# Patient Record
Sex: MALE | Race: WHITE | NOT HISPANIC OR LATINO | Employment: FULL TIME | URBAN - METROPOLITAN AREA
[De-identification: names, ages, dates, MRNs, and addresses within clinical notes are randomized per-mention and may not be internally consistent; named-entity substitution may affect disease eponyms.]

---

## 2017-02-08 ENCOUNTER — HOSPITAL ENCOUNTER (EMERGENCY)
Facility: HOSPITAL | Age: 62
Discharge: HOME | End: 2017-02-08
Attending: EMERGENCY MEDICINE | Admitting: EMERGENCY MEDICINE
Payer: COMMERCIAL

## 2017-02-08 ENCOUNTER — APPOINTMENT (EMERGENCY)
Dept: RADIOLOGY | Facility: HOSPITAL | Age: 62
End: 2017-02-08
Payer: COMMERCIAL

## 2017-02-08 VITALS
SYSTOLIC BLOOD PRESSURE: 121 MMHG | TEMPERATURE: 102.1 F | BODY MASS INDEX: 34.3 KG/M2 | RESPIRATION RATE: 15 BRPM | DIASTOLIC BLOOD PRESSURE: 59 MMHG | HEART RATE: 105 BPM | WEIGHT: 219 LBS | OXYGEN SATURATION: 93 %

## 2017-02-08 DIAGNOSIS — J11.1 INFLUENZA: Primary | ICD-10-CM

## 2017-02-08 LAB
FLUAV AG SPEC QL IA: NEGATIVE
FLUBV AG SPEC QL IA: NEGATIVE

## 2017-02-08 PROCEDURE — 99283 EMERGENCY DEPT VISIT LOW MDM: CPT

## 2017-02-08 PROCEDURE — 87798 DETECT AGENT NOS DNA AMP: CPT | Performed by: EMERGENCY MEDICINE

## 2017-02-08 PROCEDURE — 87400 INFLUENZA A/B EACH AG IA: CPT | Performed by: EMERGENCY MEDICINE

## 2017-02-08 PROCEDURE — 71020 HB CHEST X-RAY 2VW FRONTAL&LATL: CPT

## 2017-02-08 RX ORDER — OSELTAMIVIR PHOSPHATE 75 MG/1
75 CAPSULE ORAL ONCE
Status: COMPLETED | OUTPATIENT
Start: 2017-02-08 | End: 2017-02-08

## 2017-02-08 RX ORDER — IBUPROFEN 600 MG/1
600 TABLET ORAL ONCE
Status: COMPLETED | OUTPATIENT
Start: 2017-02-08 | End: 2017-02-08

## 2017-02-08 RX ORDER — OMEPRAZOLE 20 MG/1
20 CAPSULE, DELAYED RELEASE ORAL DAILY PRN
COMMUNITY
End: 2018-12-06

## 2017-02-08 RX ORDER — OSELTAMIVIR PHOSPHATE 75 MG/1
75 CAPSULE ORAL 2 TIMES DAILY
Qty: 10 CAPSULE | Refills: 0 | Status: SHIPPED | OUTPATIENT
Start: 2017-02-08 | End: 2017-02-12 | Stop reason: SDDI

## 2017-02-08 RX ADMIN — IBUPROFEN 600 MG: 600 TABLET, FILM COATED ORAL at 22:27

## 2017-02-08 RX ADMIN — OSELTAMIVIR PHOSPHATE 75 MG: 75 CAPSULE ORAL at 23:22

## 2017-02-09 LAB
FLUAV AG SPEC QL: DETECTED
FLUBV AG SPEC QL: ABNORMAL
RSV B RNA SPEC QL NAA+PROBE: ABNORMAL

## 2017-02-10 ENCOUNTER — TELEPHONE (OUTPATIENT)
Dept: EMERGENCY DEPT | Facility: HOSPITAL | Age: 62
End: 2017-02-10

## 2017-02-12 ENCOUNTER — HOSPITAL ENCOUNTER (EMERGENCY)
Facility: HOSPITAL | Age: 62
Discharge: HOME | End: 2017-02-12
Attending: EMERGENCY MEDICINE
Payer: COMMERCIAL

## 2017-02-12 VITALS
BODY MASS INDEX: 34.3 KG/M2 | DIASTOLIC BLOOD PRESSURE: 68 MMHG | SYSTOLIC BLOOD PRESSURE: 154 MMHG | TEMPERATURE: 97.4 F | WEIGHT: 219 LBS | RESPIRATION RATE: 18 BRPM | HEART RATE: 70 BPM | OXYGEN SATURATION: 98 %

## 2017-02-12 DIAGNOSIS — B34.9 VIRAL SYNDROME: Primary | ICD-10-CM

## 2017-02-12 LAB
ANION GAP SERPL CALCULATED.3IONS-SCNC: 12 MMOL/L (ref 4–13)
BASOPHILS # BLD AUTO: 0.03 THOUSANDS/ΜL (ref 0–0.1)
BASOPHILS NFR BLD AUTO: 1 % (ref 0–1)
BUN SERPL-MCNC: 13 MG/DL (ref 5–25)
CALCIUM SERPL-MCNC: 8.1 MG/DL (ref 8.3–10.1)
CHLORIDE SERPL-SCNC: 107 MMOL/L (ref 100–108)
CO2 SERPL-SCNC: 25 MMOL/L (ref 21–32)
CREAT SERPL-MCNC: 1.01 MG/DL (ref 0.6–1.3)
EOSINOPHIL # BLD AUTO: 0.15 THOUSAND/ΜL (ref 0–0.61)
EOSINOPHIL NFR BLD AUTO: 3 % (ref 0–6)
ERYTHROCYTE [DISTWIDTH] IN BLOOD BY AUTOMATED COUNT: 14.2 % (ref 11.6–15.1)
GFR SERPL CREATININE-BSD FRML MDRD: >60 ML/MIN/1.73SQ M
GLUCOSE SERPL-MCNC: 118 MG/DL (ref 65–140)
HCT VFR BLD AUTO: 43.7 % (ref 36.5–49.3)
HGB BLD-MCNC: 15.4 G/DL (ref 12–17)
LIPASE SERPL-CCNC: 116 U/L (ref 73–393)
LYMPHOCYTES # BLD AUTO: 1.58 THOUSANDS/ΜL (ref 0.6–4.47)
LYMPHOCYTES NFR BLD AUTO: 34 % (ref 14–44)
MCH RBC QN AUTO: 32.2 PG (ref 26.8–34.3)
MCHC RBC AUTO-ENTMCNC: 35.2 G/DL (ref 31.4–37.4)
MCV RBC AUTO: 91 FL (ref 82–98)
MONOCYTES # BLD AUTO: 0.41 THOUSAND/ΜL (ref 0.17–1.22)
MONOCYTES NFR BLD AUTO: 9 % (ref 4–12)
NEUTROPHILS # BLD AUTO: 2.52 THOUSANDS/ΜL (ref 1.85–7.62)
NEUTS SEG NFR BLD AUTO: 53 % (ref 43–75)
PLATELET # BLD AUTO: 172 THOUSANDS/UL (ref 149–390)
PMV BLD AUTO: 10.5 FL (ref 8.9–12.7)
POTASSIUM SERPL-SCNC: 3.6 MMOL/L (ref 3.5–5.3)
RBC # BLD AUTO: 4.78 MILLION/UL (ref 3.88–5.62)
SODIUM SERPL-SCNC: 144 MMOL/L (ref 136–145)
TROPONIN I SERPL-MCNC: <0.02 NG/ML
WBC # BLD AUTO: 4.69 THOUSAND/UL (ref 4.31–10.16)

## 2017-02-12 PROCEDURE — 84484 ASSAY OF TROPONIN QUANT: CPT | Performed by: EMERGENCY MEDICINE

## 2017-02-12 PROCEDURE — 83690 ASSAY OF LIPASE: CPT | Performed by: EMERGENCY MEDICINE

## 2017-02-12 PROCEDURE — 85025 COMPLETE CBC W/AUTO DIFF WBC: CPT | Performed by: EMERGENCY MEDICINE

## 2017-02-12 PROCEDURE — 80048 BASIC METABOLIC PNL TOTAL CA: CPT | Performed by: EMERGENCY MEDICINE

## 2017-02-12 PROCEDURE — 96360 HYDRATION IV INFUSION INIT: CPT

## 2017-02-12 PROCEDURE — 99285 EMERGENCY DEPT VISIT HI MDM: CPT

## 2017-02-12 PROCEDURE — 36415 COLL VENOUS BLD VENIPUNCTURE: CPT | Performed by: EMERGENCY MEDICINE

## 2017-02-12 PROCEDURE — 93005 ELECTROCARDIOGRAM TRACING: CPT | Performed by: EMERGENCY MEDICINE

## 2017-02-12 RX ADMIN — SODIUM CHLORIDE 1000 ML: 0.9 INJECTION, SOLUTION INTRAVENOUS at 05:40

## 2017-02-13 LAB
ATRIAL RATE: 73 BPM
P AXIS: 30 DEGREES
PR INTERVAL: 166 MS
QRS AXIS: 20 DEGREES
QRSD INTERVAL: 90 MS
QT INTERVAL: 412 MS
QTC INTERVAL: 453 MS
T WAVE AXIS: 18 DEGREES
VENTRICULAR RATE: 73 BPM

## 2017-05-03 ENCOUNTER — ALLSCRIPTS OFFICE VISIT (OUTPATIENT)
Dept: OTHER | Facility: OTHER | Age: 62
End: 2017-05-03

## 2017-07-03 DIAGNOSIS — R53.83 OTHER FATIGUE: ICD-10-CM

## 2017-07-03 DIAGNOSIS — E78.5 HYPERLIPIDEMIA: ICD-10-CM

## 2017-07-03 DIAGNOSIS — R07.9 CHEST PAIN: ICD-10-CM

## 2017-08-31 ENCOUNTER — TRANSCRIBE ORDERS (OUTPATIENT)
Dept: ADMINISTRATIVE | Facility: HOSPITAL | Age: 62
End: 2017-08-31

## 2017-08-31 DIAGNOSIS — E78.5 OTHER AND UNSPECIFIED HYPERLIPIDEMIA: ICD-10-CM

## 2017-08-31 DIAGNOSIS — R53.83 LETHARGY: ICD-10-CM

## 2017-08-31 DIAGNOSIS — R07.9 CHEST PAIN, UNSPECIFIED: Primary | ICD-10-CM

## 2017-09-12 ENCOUNTER — HOSPITAL ENCOUNTER (OUTPATIENT)
Dept: NON INVASIVE DIAGNOSTICS | Facility: CLINIC | Age: 62
Discharge: HOME | End: 2017-09-12
Payer: COMMERCIAL

## 2017-09-12 DIAGNOSIS — R07.9 CHEST PAIN, UNSPECIFIED: ICD-10-CM

## 2017-09-12 DIAGNOSIS — R53.83 LETHARGY: ICD-10-CM

## 2017-09-12 DIAGNOSIS — E78.5 OTHER AND UNSPECIFIED HYPERLIPIDEMIA: ICD-10-CM

## 2017-09-12 PROCEDURE — 93306 TTE W/DOPPLER COMPLETE: CPT

## 2017-09-30 ENCOUNTER — GENERIC CONVERSION - ENCOUNTER (OUTPATIENT)
Dept: OTHER | Facility: OTHER | Age: 62
End: 2017-09-30

## 2017-10-09 ENCOUNTER — GENERIC CONVERSION - ENCOUNTER (OUTPATIENT)
Dept: OTHER | Facility: OTHER | Age: 62
End: 2017-10-09

## 2017-10-19 ENCOUNTER — APPOINTMENT (EMERGENCY)
Dept: CT IMAGING | Facility: HOSPITAL | Age: 62
End: 2017-10-19
Payer: COMMERCIAL

## 2017-10-19 ENCOUNTER — HOSPITAL ENCOUNTER (EMERGENCY)
Facility: HOSPITAL | Age: 62
Discharge: HOME | End: 2017-10-19
Attending: EMERGENCY MEDICINE | Admitting: EMERGENCY MEDICINE
Payer: COMMERCIAL

## 2017-10-19 VITALS
WEIGHT: 214 LBS | HEART RATE: 62 BPM | DIASTOLIC BLOOD PRESSURE: 87 MMHG | SYSTOLIC BLOOD PRESSURE: 158 MMHG | HEIGHT: 67 IN | RESPIRATION RATE: 18 BRPM | BODY MASS INDEX: 33.59 KG/M2 | OXYGEN SATURATION: 99 % | TEMPERATURE: 97.4 F

## 2017-10-19 DIAGNOSIS — N20.1 URETEROLITHIASIS: Primary | ICD-10-CM

## 2017-10-19 LAB
ALBUMIN SERPL BCP-MCNC: 3.7 G/DL (ref 3.5–5)
ALP SERPL-CCNC: 108 U/L (ref 46–116)
ALT SERPL W P-5'-P-CCNC: 37 U/L (ref 12–78)
ANION GAP SERPL CALCULATED.3IONS-SCNC: 9 MMOL/L (ref 4–13)
AST SERPL W P-5'-P-CCNC: 25 U/L (ref 5–45)
BASOPHILS # BLD AUTO: 0.04 THOUSANDS/ΜL (ref 0–0.1)
BASOPHILS NFR BLD AUTO: 1 % (ref 0–1)
BILIRUB SERPL-MCNC: 0.6 MG/DL (ref 0.2–1)
BUN SERPL-MCNC: 17 MG/DL (ref 5–25)
CALCIUM SERPL-MCNC: 9.1 MG/DL (ref 8.3–10.1)
CHLORIDE SERPL-SCNC: 102 MMOL/L (ref 100–108)
CLARITY, POC: NORMAL
CO2 SERPL-SCNC: 31 MMOL/L (ref 21–32)
COLOR, POC: YELLOW
CREAT SERPL-MCNC: 1.19 MG/DL (ref 0.6–1.3)
EOSINOPHIL # BLD AUTO: 0.18 THOUSAND/ΜL (ref 0–0.61)
EOSINOPHIL NFR BLD AUTO: 3 % (ref 0–6)
ERYTHROCYTE [DISTWIDTH] IN BLOOD BY AUTOMATED COUNT: 14.1 % (ref 11.6–15.1)
EXT BILIRUBIN, UA: NORMAL
EXT BLOOD URINE: NORMAL
EXT GLUCOSE, UA: NORMAL
EXT KETONES: NORMAL
EXT NITRITE, UA: NORMAL
EXT PH, UA: 8
EXT PROTEIN, UA: NORMAL
EXT SPECIFIC GRAVITY, UA: 1
EXT UROBILINOGEN: 0.2
GFR SERPL CREATININE-BSD FRML MDRD: 65 ML/MIN/1.73SQ M
GLUCOSE SERPL-MCNC: 100 MG/DL (ref 65–140)
HCT VFR BLD AUTO: 46 % (ref 36.5–49.3)
HGB BLD-MCNC: 16.1 G/DL (ref 12–17)
LYMPHOCYTES # BLD AUTO: 1.99 THOUSANDS/ΜL (ref 0.6–4.47)
LYMPHOCYTES NFR BLD AUTO: 31 % (ref 14–44)
MCH RBC QN AUTO: 32 PG (ref 26.8–34.3)
MCHC RBC AUTO-ENTMCNC: 35 G/DL (ref 31.4–37.4)
MCV RBC AUTO: 92 FL (ref 82–98)
MONOCYTES # BLD AUTO: 0.63 THOUSAND/ΜL (ref 0.17–1.22)
MONOCYTES NFR BLD AUTO: 10 % (ref 4–12)
NEUTROPHILS # BLD AUTO: 3.66 THOUSANDS/ΜL (ref 1.85–7.62)
NEUTS SEG NFR BLD AUTO: 55 % (ref 43–75)
PLATELET # BLD AUTO: 219 THOUSANDS/UL (ref 149–390)
PMV BLD AUTO: 10 FL (ref 8.9–12.7)
POTASSIUM SERPL-SCNC: 3.3 MMOL/L (ref 3.5–5.3)
PROT SERPL-MCNC: 8 G/DL (ref 6.4–8.2)
RBC # BLD AUTO: 5.03 MILLION/UL (ref 3.88–5.62)
SODIUM SERPL-SCNC: 142 MMOL/L (ref 136–145)
WBC # BLD AUTO: 6.5 THOUSAND/UL (ref 4.31–10.16)
WBC # BLD EST: NORMAL 10*3/UL

## 2017-10-19 PROCEDURE — 80053 COMPREHEN METABOLIC PANEL: CPT | Performed by: EMERGENCY MEDICINE

## 2017-10-19 PROCEDURE — 74176 CT ABD & PELVIS W/O CONTRAST: CPT

## 2017-10-19 PROCEDURE — 99284 EMERGENCY DEPT VISIT MOD MDM: CPT

## 2017-10-19 PROCEDURE — 81002 URINALYSIS NONAUTO W/O SCOPE: CPT | Performed by: EMERGENCY MEDICINE

## 2017-10-19 PROCEDURE — 36415 COLL VENOUS BLD VENIPUNCTURE: CPT | Performed by: EMERGENCY MEDICINE

## 2017-10-19 PROCEDURE — 85025 COMPLETE CBC W/AUTO DIFF WBC: CPT | Performed by: EMERGENCY MEDICINE

## 2017-10-19 RX ORDER — CHLORTHALIDONE 25 MG/1
25 TABLET ORAL DAILY
COMMUNITY
End: 2021-04-09 | Stop reason: SDUPTHER

## 2017-10-19 RX ORDER — HYDROCODONE BITARTRATE AND ACETAMINOPHEN 5; 325 MG/1; MG/1
1 TABLET ORAL EVERY 4 HOURS PRN
Qty: 16 TABLET | Refills: 0 | Status: SHIPPED | OUTPATIENT
Start: 2017-10-19 | End: 2017-10-29

## 2017-10-19 NOTE — ED PROVIDER NOTES
History  Chief Complaint   Patient presents with    Flank Pain     Patient presents to ER stating he started with right flank pain 15 minutes ago, patient has had numerous kidney stones and lithotripsies in the past        This is a 59-year-old male complaining of right flank pain started about 2 hours ago radiates to the right groin sharp pain started spontaneously suddenly  Patient did not take any pain medications at the pain has decreased in severity  Since that time frame  It feels similar to his kidney stones in the past where he has had stents and lithotripsy  Patient did not notice any fevers nausea vomiting diarrhea or constipation  He did not notice any blood in the urine or urinary frequency burning or pain  Flank Pain       Prior to Admission Medications   Prescriptions Last Dose Informant Patient Reported? Taking? Omega-3 Fatty Acids (FISH OIL PO)   Yes No   Sig: Take 2,400 mg by mouth daily     Potassium Citrate-Citric Acid (POT CITRATE-CIT AC PO)   Yes No   Sig: Take 20 mEq by mouth 2 (two) times a day     chlorthalidone 25 mg tablet   Yes Yes   Sig: Take 25 mg by mouth daily   omeprazole (PriLOSEC) 20 mg delayed release capsule   Yes No   Sig: Take 20 mg by mouth daily as needed        Facility-Administered Medications: None       Past Medical History:   Diagnosis Date    GERD (gastroesophageal reflux disease)     Hyperlipidemia     Kidney stone     Meniere disease     Pulmonary embolism (HCC)        Past Surgical History:   Procedure Laterality Date    BACK SURGERY      COCHLEAR IMPLANT Right     HERNIA REPAIR      IVC FILTER INSERTION         History reviewed  No pertinent family history  I have reviewed and agree with the history as documented  Social History   Substance Use Topics    Smoking status: Never Smoker    Smokeless tobacco: Never Used    Alcohol use No        Review of Systems   Genitourinary: Positive for flank pain     All other systems reviewed and are negative  Physical Exam  ED Triage Vitals   Temperature Pulse Respirations Blood Pressure SpO2   10/19/17 0946 10/19/17 0945 10/19/17 0945 10/19/17 0945 10/19/17 0945   (!) 97 4 °F (36 3 °C) 73 18 165/82 99 %      Temp Source Heart Rate Source Patient Position - Orthostatic VS BP Location FiO2 (%)   10/19/17 0946 10/19/17 0945 10/19/17 0945 10/19/17 0945 --   Temporal Monitor Lying Right arm       Pain Score       10/19/17 0946       3           Physical Exam   Constitutional: He appears well-developed and well-nourished  HENT:   Mouth/Throat: Oropharynx is clear and moist    Eyes: Conjunctivae and EOM are normal  Pupils are equal, round, and reactive to light  Neck: Normal range of motion  Neck supple  No spinous process tenderness present  Cardiovascular: Normal rate, regular rhythm, normal heart sounds and intact distal pulses  Pulmonary/Chest: Effort normal and breath sounds normal  No respiratory distress  He has no wheezes  Abdominal: Soft  Bowel sounds are normal  He exhibits no distension  There is no tenderness  There is CVA tenderness  Musculoskeletal: Normal range of motion  Neurological: He is alert  He has normal strength  No sensory deficit  GCS eye subscore is 4  GCS verbal subscore is 5  GCS motor subscore is 6  Skin: Skin is warm and dry  No rash noted  Psychiatric: He has a normal mood and affect  Nursing note and vitals reviewed        ED Medications  Medications - No data to display    Diagnostic Studies  Labs Reviewed   COMPREHENSIVE METABOLIC PANEL - Abnormal        Result Value Ref Range Status    Potassium 3 3 (*) 3 5 - 5 3 mmol/L Final    Sodium 142  136 - 145 mmol/L Final    Chloride 102  100 - 108 mmol/L Final    CO2 31  21 - 32 mmol/L Final    Anion Gap 9  4 - 13 mmol/L Final    BUN 17  5 - 25 mg/dL Final    Creatinine 1 19  0 60 - 1 30 mg/dL Final    Comment: Standardized to IDMS reference method    Glucose 100  65 - 140 mg/dL Final    Comment:   If the patient is fasting, the ADA then defines impaired fasting glucose as > 100 mg/dL and diabetes as > or equal to 123 mg/dL  Specimen collection should occur prior to Sulfasalazine administration due to the potential for falsely depressed results  Specimen collection should occur prior to Sulfapyridine administration due to the potential for falsely elevated results  Calcium 9 1  8 3 - 10 1 mg/dL Final    AST 25  5 - 45 U/L Final    Comment:   Specimen collection should occur prior to Sulfasalazine administration due to the potential for falsely depressed results  ALT 37  12 - 78 U/L Final    Comment:   Specimen collection should occur prior to Sulfasalazine administration due to the potential for falsely depressed results  Alkaline Phosphatase 108  46 - 116 U/L Final    Total Protein 8 0  6 4 - 8 2 g/dL Final    Albumin 3 7  3 5 - 5 0 g/dL Final    Total Bilirubin 0 60  0 20 - 1 00 mg/dL Final    eGFR 65  ml/min/1 73sq m Final    Narrative:     National Kidney Disease Education Program recommendations are as follows:  GFR calculation is accurate only with a steady state creatinine  Chronic Kidney disease less than 60 ml/min/1 73 sq  meters  Kidney failure less than 15 ml/min/1 73 sq  meters     CBC AND DIFFERENTIAL - Normal    WBC 6 50  4 31 - 10 16 Thousand/uL Final    RBC 5 03  3 88 - 5 62 Million/uL Final    Hemoglobin 16 1  12 0 - 17 0 g/dL Final    Hematocrit 46 0  36 5 - 49 3 % Final    MCV 92  82 - 98 fL Final    MCH 32 0  26 8 - 34 3 pg Final    MCHC 35 0  31 4 - 37 4 g/dL Final    RDW 14 1  11 6 - 15 1 % Final    MPV 10 0  8 9 - 12 7 fL Final    Platelets 189  158 - 390 Thousands/uL Final    Neutrophils Relative 55  43 - 75 % Final    Lymphocytes Relative 31  14 - 44 % Final    Monocytes Relative 10  4 - 12 % Final    Eosinophils Relative 3  0 - 6 % Final    Basophils Relative 1  0 - 1 % Final    Neutrophils Absolute 3 66  1 85 - 7 62 Thousands/µL Final    Lymphocytes Absolute 1 99  0 60 - 4 47 Thousands/µL Final    Monocytes Absolute 0 63  0 17 - 1 22 Thousand/µL Final    Eosinophils Absolute 0 18  0 00 - 0 61 Thousand/µL Final    Basophils Absolute 0 04  0 00 - 0 10 Thousands/µL Final   POCT URINALYSIS DIPSTICK - Normal    Color, UA YELLOW   Final    Clarity, UA HAZY   Final    EXT Glucose, UA NEG   Final    EXT Bilirubin, UA (Ref: Negative) NEG   Final    EXT Ketones, UA (Ref: Negative) NEG   Final    EXT Spec Grav, UA 1 005   Final    EXT Blood, UA (Ref: Negative) MOD   Final    EXT pH, UA 8 0   Final    EXT Protein, UA (Ref: Negative) TRACE   Final    EXT Urobilinogen, UA (Ref: 0 2- 1 0) 0 2   Final    EXT Leukocytes, UA (Ref: Negative) NEG   Final    EXT Nitrite, UA (Ref: Negative) NEG   Final       CT renal stone study abdomen pelvis without contrast   Final Result   Addendum 1 of 1   Addendum; Impression;         Mild dilation of the right pelvicalyceal system with an obstructing 5 mm    calculus in the proximal right ureter near the right UP junction      Bilateral nonobstructing renal calculi         ##[email or web address]##[email or web address]         Final      Mild dilation of the right pelvicalyceal system with an obstructing 5 mm calculus in the proximal right ureter near the right UP junction      Bilateral nonobstructing renal calculi         Workstation performed: [id number]             Procedures  Procedures      Phone Contacts  ED Phone Contact    ED Course  ED Course                                MDM  Number of Diagnoses or Management Options  Ureterolithiasis: new and requires workup     Amount and/or Complexity of Data Reviewed  Clinical lab tests: ordered and reviewed  Tests in the radiology section of CPT®: ordered and reviewed    Patient Progress  Patient progress: improved    CritCare Time    Disposition  Final diagnoses:   Ureterolithiasis     ED Disposition     ED Disposition Condition Comment    Discharge  [address]  discharge to home/self care      Condition at discharge: Good Follow-up Information     Follow up With Specialties Details Why Contact Info    Jose Morataya MD Urology In 5 days  [address] [facility]  [facility] [address]  [phone]          Discharge Medication List as of 10/19/2017 10:38 AM      START taking these medications    Details   HYDROcodone-acetaminophen (NORCO) 5-325 mg per tablet Take 1 tablet by mouth every 4 (four) hours as needed for pain for up to 10 days Max Daily Amount: 6 tablets, Starting Thu 10/19/2017, Until Sun 10/29/2017, Print         CONTINUE these medications which have NOT CHANGED    Details   chlorthalidone 25 mg tablet Take 25 mg by mouth daily, Historical Med      Omega-3 Fatty Acids (FISH OIL PO) Take 2,400 mg by mouth daily  , Historical Med      omeprazole (PriLOSEC) 20 mg delayed release capsule Take 20 mg by mouth daily as needed  , Historical Med      Potassium Citrate-Citric Acid (POT CITRATE-CIT AC PO) Take 20 mEq by mouth 2 (two) times a day  , Historical Med           No discharge procedures on file      ED Provider  Electronically Signed by       Royal Stephenson DO  10/19/17 [id number]

## 2017-10-19 NOTE — DISCHARGE INSTRUCTIONS
Ureteral Stones   WHAT YOU NEED TO KNOW:   A ureteral stone is a stone that forms in the kidney and moves down the ureter and gets stuck there  The ureter is the tube that takes urine from the kidney to the bladder  Stones can form in the urinary system when your urine has high levels of minerals and salts  Urinary stones can be made of uric acid, calcium, phosphate, or oxalate crystals  DISCHARGE INSTRUCTIONS:   Return to the emergency department if:   · You have severe pain that does not improve, even after you take medicine  · You have vomiting that is not relieved by medicine  · You develop a fever  Contact your healthcare provider if:   · You develop a fever  · You have any questions or concerns about your condition or care  Follow up with your healthcare provider as directed: You may need to return for more tests  Write down your questions so you remember to ask them during your visits  Medicines: You may need any of the following:  · NSAIDs , such as ibuprofen, help decrease swelling, pain, and fever  This medicine is available with or without a doctor's order  NSAIDs can cause stomach bleeding or kidney problems in certain people  If you take blood thinner medicine, always ask your healthcare provider if NSAIDs are safe for you  Always read the medicine label and follow directions  · Prescription pain medicine  may help decrease pain or help your ureteral stone pass  Do not wait until the pain is severe before you take pain medicine  · Nausea medicine  may help calm your stomach and prevent vomiting  · Take your medicine as directed  Contact your healthcare provider if you think your medicine is not helping or if you have side effects  Tell him or her if you are allergic to any medicine  Keep a list of the medicines, vitamins, and herbs you take  Include the amounts, and when and why you take them  Bring the list or the pill bottles to follow-up visits   Carry your medicine list with you in case of an emergency  Self-care:   · Drink plenty of liquids  Your healthcare provider may tell you to drink at least 8 to 12 (eight-ounce) cups of liquids each day  This helps flush out the ureteral stones when you urinate  Water is the best liquid to drink  · Strain your urine every time you go to the bathroom  Urinate through a strainer or a piece of thin cloth to catch the stones  Take the stones to your healthcare provider so they can be sent to the lab for tests  This will help your healthcare providers plan the best treatment for you  · Ask your healthcare provider about any nutrition changes you need to make  You may need to limit certain foods such as foods high in sodium (salt), certain protein foods, or foods high in oxalate  After you pass your ureteral stone: Once you have passed your ureteral stone, you may need to do a 24-hour urine test  You may need to save all of your urine for 24 hours  Each time you go to the bathroom, you will urinate into a container  Then you will pour your urine into a larger container that is kept cold  You may be told to write down the time and amount of urine you passed  At the end of 24 hours, the urine is sent to a lab for tests  Results from the test will help your healthcare provider plan ways to prevent more stones from forming  © 2017 [address] [employer] Information is for End User's use only and may not be sold, redistributed or otherwise used for commercial purposes  All illustrations and images included in CareNotes® are the copyrighted property of A [employer]  or [employer]  The above information is an  only  It is not intended as medical advice for individual conditions or treatments  Talk to your doctor, nurse or pharmacist before following any medical regimen to see if it is safe and effective for you

## 2017-10-30 ENCOUNTER — GENERIC CONVERSION - ENCOUNTER (OUTPATIENT)
Dept: OTHER | Facility: OTHER | Age: 62
End: 2017-10-30

## 2017-11-01 ENCOUNTER — GENERIC CONVERSION - ENCOUNTER (OUTPATIENT)
Dept: OTHER | Facility: OTHER | Age: 62
End: 2017-11-01

## 2017-12-01 ENCOUNTER — HOSPITAL ENCOUNTER (OUTPATIENT)
Dept: RADIOLOGY | Facility: HOSPITAL | Age: 62
Discharge: HOME | End: 2017-12-01
Attending: UROLOGY
Payer: COMMERCIAL

## 2017-12-01 ENCOUNTER — GENERIC CONVERSION - ENCOUNTER (OUTPATIENT)
Dept: OTHER | Facility: OTHER | Age: 62
End: 2017-12-01

## 2017-12-01 ENCOUNTER — TRANSCRIBE ORDERS (OUTPATIENT)
Dept: ADMINISTRATIVE | Facility: HOSPITAL | Age: 62
End: 2017-12-01

## 2017-12-01 DIAGNOSIS — N20.0 URIC ACID NEPHROLITHIASIS: ICD-10-CM

## 2017-12-01 DIAGNOSIS — N20.1 CALCULUS OF URETER: ICD-10-CM

## 2017-12-01 DIAGNOSIS — N20.0 URIC ACID NEPHROLITHIASIS: Primary | ICD-10-CM

## 2017-12-01 PROCEDURE — 74000 HB X-RAY EXAM OF ABDOMEN (SINGLE ANTEROPOSTERIOR VIEW): CPT

## 2018-01-13 VITALS
DIASTOLIC BLOOD PRESSURE: 68 MMHG | BODY MASS INDEX: 33.52 KG/M2 | SYSTOLIC BLOOD PRESSURE: 120 MMHG | WEIGHT: 214 LBS | HEART RATE: 66 BPM

## 2018-01-22 VITALS
HEIGHT: 67 IN | WEIGHT: 213 LBS | SYSTOLIC BLOOD PRESSURE: 114 MMHG | DIASTOLIC BLOOD PRESSURE: 70 MMHG | HEART RATE: 76 BPM | BODY MASS INDEX: 33.43 KG/M2

## 2018-04-04 ENCOUNTER — OFFICE VISIT (OUTPATIENT)
Dept: CARDIOLOGY CLINIC | Facility: CLINIC | Age: 63
End: 2018-04-04
Payer: COMMERCIAL

## 2018-04-04 ENCOUNTER — TRANSCRIBE ORDERS (OUTPATIENT)
Dept: ADMINISTRATIVE | Facility: HOSPITAL | Age: 63
End: 2018-04-04

## 2018-04-04 VITALS
HEART RATE: 68 BPM | SYSTOLIC BLOOD PRESSURE: 136 MMHG | WEIGHT: 216.2 LBS | BODY MASS INDEX: 33.93 KG/M2 | DIASTOLIC BLOOD PRESSURE: 80 MMHG | RESPIRATION RATE: 16 BRPM | HEIGHT: 67 IN

## 2018-04-04 DIAGNOSIS — E78.00 PURE HYPERCHOLESTEROLEMIA: Primary | ICD-10-CM

## 2018-04-04 DIAGNOSIS — R07.9 CHEST PAIN, UNSPECIFIED TYPE: ICD-10-CM

## 2018-04-04 PROCEDURE — 99214 OFFICE O/P EST MOD 30 MIN: CPT | Performed by: INTERNAL MEDICINE

## 2018-04-04 PROCEDURE — 93000 ELECTROCARDIOGRAM COMPLETE: CPT | Performed by: INTERNAL MEDICINE

## 2018-04-04 RX ORDER — PSEUDOEPHEDRINE HYDROCHLORIDE 30 MG/1
TABLET ORAL
COMMUNITY
End: 2018-07-11

## 2018-04-04 RX ORDER — CRANBERRY FRUIT EXTRACT 650 MG
CAPSULE ORAL 2 TIMES DAILY
COMMUNITY

## 2018-04-04 RX ORDER — PRAVASTATIN SODIUM 10 MG
1 TABLET ORAL
COMMUNITY
Start: 2016-08-09 | End: 2018-07-11

## 2018-04-04 RX ORDER — MAGNESIUM OXIDE 400 MG/1
TABLET ORAL
COMMUNITY
End: 2018-07-11

## 2018-04-04 RX ORDER — EZETIMIBE 10 MG/1
1 TABLET ORAL DAILY
COMMUNITY
Start: 2017-05-03 | End: 2018-08-22 | Stop reason: SDUPTHER

## 2018-04-04 NOTE — PATIENT INSTRUCTIONS
I will continue the patient's present medical regimen  The patient appears well compensated  I have asked the patient to call if there is a problem in the interim otherwise I will see the patient in six months time  I will also check a BMP to assess her potassium level and a carotid Doppler to exclude severe carotid artery disease

## 2018-04-04 NOTE — PROGRESS NOTES
Cardiology Follow Up    Niki Celaya   1955  [id number]  [address] [facility] [address]    1  Pure hypercholesterolemia     2  Chest pain, unspecified type         Interval History:  Patient is here for a follow-up visit  He was last seen by me in November of last year  His most recent echocardiogram done in September of last year demonstrated preserved LV systolic function without evidence of significant valvular heart disease  His EKG today demonstrates sinus rhythm  He apparently was moving snow recently in developed some lightheadedness  He also had some palpitations  He has had no problem since  He was using his upper body for a lot of the snow removal and I asked him to use caution in the future  He is concerned about his potassium level given that the urologist changed his potassium containing medication  We will check a BMP  He has had hypokalemia before  There is no problem list on file for this patient  Past Medical History:   Diagnosis Date    GERD (gastroesophageal reflux disease)     Hyperlipidemia     Kidney stone     Meniere disease     Pulmonary embolism (HCC)      Social History     Social History    Marital status:      Spouse name: N/A    Number of children: N/A    Years of education: N/A     Occupational History    Not on file  Social History Main Topics    Smoking status: Never Smoker    Smokeless tobacco: Never Used    Alcohol use No    Drug use: No    Sexual activity: Not on file     Other Topics Concern    Not on file     Social History Narrative    No narrative on file      No family history on file    Past Surgical History:   Procedure Laterality Date    BACK SURGERY      COCHLEAR IMPLANT Right     HERNIA REPAIR      IVC FILTER INSERTION         Current Outpatient Prescriptions:     ezetimibe (ZETIA) 10 mg tablet, Take 1 tablet by mouth daily, Disp: , Rfl:     pravastatin (PRAVACHOL) 10 mg tablet, Take 1 tablet by mouth, Disp: , Rfl:     chlorthalidone 25 mg tablet, Take 25 mg by mouth daily, Disp: , Rfl:     magnesium oxide (MAG-OX) 400 mg tablet, Take by mouth, Disp: , Rfl:     Omega-3 Fatty Acids (FISH OIL PO), Take 2,400 mg by mouth daily  , Disp: , Rfl:     omeprazole (PriLOSEC) 20 mg delayed release capsule, Take 20 mg by mouth daily as needed  , Disp: , Rfl:     Potassium Citrate-Citric Acid (POT CITRATE-CIT AC PO), Take 20 mEq by mouth 2 (two) times a day  , Disp: , Rfl:   Allergies   Allergen Reactions    Alcohol      vertigo    Caffeine      vertigo    Cephalosporins      hyperventilation    Penicillins Hives       Labs:not applicable  Imaging: No results found  Review of Systems:  Review of Systems   All other systems reviewed and are negative  Physical Exam:  Physical Exam   Constitutional: He is oriented to person, place, and time  He appears well-developed and well-nourished  HENT:   Head: Normocephalic and atraumatic  Eyes: Conjunctivae are normal  Pupils are equal, round, and reactive to light  Neck: Normal range of motion  Neck supple  Cardiovascular: Normal rate and normal heart sounds  Pulmonary/Chest: Effort normal and breath sounds normal    Neurological: He is alert and oriented to person, place, and time  Skin: Skin is warm and dry  Psychiatric: He has a normal mood and affect  Vitals reviewed  Discussion/Summary:I will continue the patient's present medical regimen  The patient appears well compensated  I have asked the patient to call if there is a problem in the interim otherwise I will see the patient in six months time  He would also like to get a carotid artery check and I will order a Doppler

## 2018-04-10 ENCOUNTER — OFFICE VISIT (OUTPATIENT)
Dept: URGENT CARE | Facility: CLINIC | Age: 63
End: 2018-04-10
Payer: COMMERCIAL

## 2018-04-10 VITALS
RESPIRATION RATE: 16 BRPM | TEMPERATURE: 97.5 F | OXYGEN SATURATION: 95 % | DIASTOLIC BLOOD PRESSURE: 86 MMHG | HEART RATE: 70 BPM | BODY MASS INDEX: 33.74 KG/M2 | SYSTOLIC BLOOD PRESSURE: 142 MMHG | WEIGHT: 215 LBS | HEIGHT: 67 IN

## 2018-04-10 DIAGNOSIS — H11.32 SUBCONJUNCTIVAL BLEED, LEFT: Primary | ICD-10-CM

## 2018-04-10 PROCEDURE — S9083 URGENT CARE CENTER GLOBAL: HCPCS | Performed by: FAMILY MEDICINE

## 2018-04-10 PROCEDURE — 99283 EMERGENCY DEPT VISIT LOW MDM: CPT | Performed by: FAMILY MEDICINE

## 2018-04-10 PROCEDURE — G0382 LEV 3 HOSP TYPE B ED VISIT: HCPCS | Performed by: FAMILY MEDICINE

## 2018-04-10 NOTE — PROGRESS NOTES
[facility]        NAME: Dago Ventura  is a 58 y o  male  : 1955    MRN: [id number]  DATE: April 10, 2018  TIME: 12:17 PM    Assessment and Plan   Subconjunctival bleed, left [H11 32]  1  Subconjunctival bleed, left           Patient Instructions   Patient told this is a benign condition  If any vision changes, or develop pain go to Er  Follow up with PCP in 3-5 days  Proceed to  ER if symptoms worsen  Chief Complaint     Chief Complaint   Patient presents with    Eye Pain     Since last night has had blood in left eye  No drainage  No pain         History of Present Illness       Patient here complaining of redness in his left eye  He states his co-worker pointed it out to him at work last night  When he work up this am and it was still there he thought he should get it checked out  He denies pain or discomfort, vision changes, ASA or blood thinner use, tearing, itching, or excessive straining or heavy lifting  Review of Systems   Review of Systems   Eyes: Positive for redness  Negative for photophobia, pain, discharge, itching and visual disturbance  Neurological: Negative for headaches           Current Medications       Current Outpatient Prescriptions:     chlorthalidone 25 mg tablet, Take 25 mg by mouth daily, Disp: , Rfl:     ezetimibe (ZETIA) 10 mg tablet, Take 1 tablet by mouth daily, Disp: , Rfl:     Nutritional Supplements (THERALITH XR) TABS, Take by mouth 2 (two) times a day 2 tablets BID, Disp: , Rfl:     magnesium oxide (MAG-OX) 400 mg tablet, Take by mouth, Disp: , Rfl:     Omega-3 Fatty Acids (FISH OIL PO), Take 2,400 mg by mouth daily  , Disp: , Rfl:     omeprazole (PriLOSEC) 20 mg delayed release capsule, Take 20 mg by mouth daily as needed  , Disp: , Rfl:     Potassium Citrate-Citric Acid (POT CITRATE-CIT AC PO), Take 20 mEq by mouth 2 (two) times a day  , Disp: , Rfl:     pravastatin (PRAVACHOL) 10 mg tablet, Take 1 tablet by mouth, Disp: , Rfl:   pseudoephedrine (SUDAFED) 30 mg tablet, Take by mouth, Disp: , Rfl:     Current Allergies     Allergies as of 04/10/2018 - Reviewed 04/10/2018   Allergen Reaction Noted    Moxifloxacin Shortness Of Breath and Hives 10/16/2013    Alcohol  02/08/2017    Caffeine  05/27/2016    Cephalosporins  05/27/2016    Doxycycline      Penicillins Hives 07/30/2015    Pravastatin      Tamiflu [oseltamivir] Lightheadedness 04/04/2018            The following portions of the patient's history were reviewed and updated as appropriate: allergies, current medications, past family history, past medical history, past social history, past surgical history and problem list      Past Medical History:   Diagnosis Date    GERD (gastroesophageal reflux disease)     Hyperlipidemia     Kidney stone     Meniere disease     Pulmonary embolism ([address] )        Past Surgical History:   Procedure Laterality Date    BACK SURGERY      COCHLEAR IMPLANT Right     HERNIA REPAIR      IVC FILTER INSERTION         No family history on file  Medications have been verified  Objective   /86   Pulse 70   Temp 97 5 °F (36 4 °C)   Resp 16   Ht 5' 7" (1 702 m)   Wt 97 5 kg (215 lb)   SpO2 95%   BMI 33 67 kg/m²        Physical Exam     Physical Exam   Constitutional: He appears well-developed and well-nourished  No distress  Eyes: Conjunctivae and EOM are normal  Pupils are equal, round, and reactive to light  Small subconjunctival hemorrhage just to the outside of the iris at the 9:00 position, not extending across the iris or into the anterior chamber   Incidental finding: pingueculem

## 2018-04-11 ENCOUNTER — HOSPITAL ENCOUNTER (OUTPATIENT)
Dept: NON INVASIVE DIAGNOSTICS | Facility: CLINIC | Age: 63
Discharge: HOME | End: 2018-04-11
Payer: COMMERCIAL

## 2018-04-11 DIAGNOSIS — I65.29 CAROTID ARTERY STENOSIS, UNILATERAL: ICD-10-CM

## 2018-04-11 DIAGNOSIS — R07.9 CHEST PAIN, UNSPECIFIED TYPE: ICD-10-CM

## 2018-04-11 DIAGNOSIS — E78.00 PURE HYPERCHOLESTEROLEMIA: ICD-10-CM

## 2018-04-11 PROCEDURE — 93880 EXTRACRANIAL BILAT STUDY: CPT

## 2018-04-11 PROCEDURE — 93880 EXTRACRANIAL BILAT STUDY: CPT | Performed by: INTERNAL MEDICINE

## 2018-04-13 LAB
BUN SERPL-MCNC: 18 MG/DL (ref 8–27)
BUN/CREAT SERPL: 17 (ref 10–24)
CALCIUM SERPL-MCNC: 9.2 MG/DL (ref 8.6–10.2)
CHLORIDE SERPL-SCNC: 99 MMOL/L (ref 96–106)
CO2 SERPL-SCNC: 26 MMOL/L (ref 18–29)
CREAT SERPL-MCNC: 1.09 MG/DL (ref 0.76–1.27)
GLUCOSE SERPL-MCNC: 111 MG/DL (ref 65–99)
POTASSIUM SERPL-SCNC: 3.1 MMOL/L (ref 3.5–5.2)
SL AMB EGFR AFRICAN AMERICAN: 84 ML/MIN/1.73
SL AMB EGFR NON AFRICAN AMERICAN: 72 ML/MIN/1.73
SODIUM SERPL-SCNC: 141 MMOL/L (ref 134–144)

## 2018-04-16 ENCOUNTER — TELEPHONE (OUTPATIENT)
Dept: CARDIOLOGY CLINIC | Facility: CLINIC | Age: 63
End: 2018-04-16

## 2018-04-16 DIAGNOSIS — E87.6 HYPOKALEMIA: Primary | ICD-10-CM

## 2018-04-16 NOTE — TELEPHONE ENCOUNTER
Pt called back  I attempted to call him again- no answer  Left message advising-- advised to c/b w/ question  Order in for b/w  Since message was given to Pt today- advised to get b/w on Thursday if that is ok w/ you?

## 2018-04-16 NOTE — TELEPHONE ENCOUNTER
----- Message from Pham Jimenes MD sent at 4/13/2018  3:40 PM EDT -----  Please call the patient regarding his abnormal result  His K is low   Should take KCL 40 meq bid for 2 days the 40 meq q day and get BMP Tuesday, tx

## 2018-04-18 ENCOUNTER — TELEPHONE (OUTPATIENT)
Dept: CARDIOLOGY CLINIC | Facility: CLINIC | Age: 63
End: 2018-04-18

## 2018-04-18 DIAGNOSIS — E87.6 HYPOKALEMIA: ICD-10-CM

## 2018-04-18 DIAGNOSIS — R89.9 ABNORMAL LABORATORY TEST RESULT: Primary | ICD-10-CM

## 2018-04-18 NOTE — TELEPHONE ENCOUNTER
Pt stopped into the office to check on the messages that were left on his phone  Advised pt that his K+ is 3 1, low, and needs more K+, and to re-check his labs  Pt is no longer on K+ 20 mEq-says per his urologist an alternate drug by the name of  Deitra Majestic  is better for him due to preventing kidney stones

## 2018-04-30 LAB
AMBIG ABBREV DEFAULT: NORMAL
BUN SERPL-MCNC: 18 MG/DL (ref 8–27)
BUN/CREAT SERPL: 16 (ref 10–24)
CALCIUM SERPL-MCNC: 9.1 MG/DL (ref 8.6–10.2)
CHLORIDE SERPL-SCNC: 100 MMOL/L (ref 96–106)
CO2 SERPL-SCNC: 27 MMOL/L (ref 18–29)
CREAT SERPL-MCNC: 1.13 MG/DL (ref 0.76–1.27)
GLUCOSE SERPL-MCNC: 109 MG/DL (ref 65–99)
POTASSIUM SERPL-SCNC: 3.7 MMOL/L (ref 3.5–5.2)
SL AMB EGFR AFRICAN AMERICAN: 80 ML/MIN/1.73
SL AMB EGFR NON AFRICAN AMERICAN: 69 ML/MIN/1.73
SODIUM SERPL-SCNC: 142 MMOL/L (ref 134–144)

## 2018-06-18 DIAGNOSIS — E78.00 PURE HYPERCHOLESTEROLEMIA: Primary | ICD-10-CM

## 2018-06-18 RX ORDER — EZETIMIBE 10 MG/1
10 TABLET ORAL DAILY
Qty: 90 TABLET | Refills: 0 | Status: SHIPPED | OUTPATIENT
Start: 2018-06-18 | End: 2018-07-11

## 2018-07-11 ENCOUNTER — APPOINTMENT (EMERGENCY)
Dept: CT IMAGING | Facility: HOSPITAL | Age: 63
End: 2018-07-11
Payer: COMMERCIAL

## 2018-07-11 ENCOUNTER — HOSPITAL ENCOUNTER (EMERGENCY)
Facility: HOSPITAL | Age: 63
Discharge: HOME | End: 2018-07-11
Attending: EMERGENCY MEDICINE | Admitting: EMERGENCY MEDICINE
Payer: COMMERCIAL

## 2018-07-11 VITALS
HEIGHT: 67 IN | SYSTOLIC BLOOD PRESSURE: 114 MMHG | TEMPERATURE: 98.2 F | DIASTOLIC BLOOD PRESSURE: 78 MMHG | WEIGHT: 217 LBS | BODY MASS INDEX: 34.06 KG/M2 | RESPIRATION RATE: 18 BRPM | OXYGEN SATURATION: 97 % | HEART RATE: 75 BPM

## 2018-07-11 DIAGNOSIS — I71.9 AORTIC ANEURYSM (HCC): ICD-10-CM

## 2018-07-11 DIAGNOSIS — E87.6 HYPOKALEMIA: ICD-10-CM

## 2018-07-11 DIAGNOSIS — S30.1XXA ABDOMINAL CONTUSION: Primary | ICD-10-CM

## 2018-07-11 LAB
ANION GAP BLD CALC-SCNC: 17 MMOL/L (ref 4–13)
BUN BLD-MCNC: 19 MG/DL (ref 5–25)
CA-I BLD-SCNC: 1.07 MMOL/L (ref 1.12–1.32)
CHLORIDE BLD-SCNC: 99 MMOL/L (ref 100–108)
CREAT BLD-MCNC: 1.1 MG/DL (ref 0.6–1.3)
GFR SERPL CREATININE-BSD FRML MDRD: 71 ML/MIN/1.73SQ M
GLUCOSE SERPL-MCNC: 115 MG/DL (ref 65–140)
HCT VFR BLD CALC: 45 % (ref 36.5–49.3)
HGB BLDA-MCNC: 15.3 G/DL (ref 12–17)
PCO2 BLD: 29 MMOL/L (ref 21–32)
POTASSIUM BLD-SCNC: 3 MMOL/L (ref 3.5–5.3)
SODIUM BLD-SCNC: 141 MMOL/L (ref 136–145)
SPECIMEN SOURCE: ABNORMAL

## 2018-07-11 PROCEDURE — 74160 CT ABDOMEN W/CONTRAST: CPT

## 2018-07-11 PROCEDURE — 99284 EMERGENCY DEPT VISIT MOD MDM: CPT

## 2018-07-11 PROCEDURE — 96360 HYDRATION IV INFUSION INIT: CPT

## 2018-07-11 PROCEDURE — 71260 CT THORAX DX C+: CPT

## 2018-07-11 PROCEDURE — 80047 BASIC METABLC PNL IONIZED CA: CPT

## 2018-07-11 PROCEDURE — 85014 HEMATOCRIT: CPT

## 2018-07-11 RX ORDER — IBUPROFEN 600 MG/1
600 TABLET ORAL ONCE
Status: COMPLETED | OUTPATIENT
Start: 2018-07-11 | End: 2018-07-11

## 2018-07-11 RX ORDER — POTASSIUM CHLORIDE 20 MEQ/1
40 TABLET, EXTENDED RELEASE ORAL ONCE
Status: COMPLETED | OUTPATIENT
Start: 2018-07-11 | End: 2018-07-11

## 2018-07-11 RX ORDER — POTASSIUM CHLORIDE 750 MG/1
20 TABLET, EXTENDED RELEASE ORAL DAILY
Qty: 10 TABLET | Refills: 0 | Status: SHIPPED | OUTPATIENT
Start: 2018-07-11 | End: 2018-08-22 | Stop reason: SDUPTHER

## 2018-07-11 RX ORDER — SODIUM CHLORIDE 9 MG/ML
125 INJECTION, SOLUTION INTRAVENOUS CONTINUOUS
Status: DISCONTINUED | OUTPATIENT
Start: 2018-07-11 | End: 2018-07-11 | Stop reason: HOSPADM

## 2018-07-11 RX ADMIN — SODIUM CHLORIDE 125 ML/HR: 0.9 INJECTION, SOLUTION INTRAVENOUS at 14:24

## 2018-07-11 RX ADMIN — POTASSIUM CHLORIDE 40 MEQ: 1500 TABLET, EXTENDED RELEASE ORAL at 15:29

## 2018-07-11 RX ADMIN — IOHEXOL 100 ML: 350 INJECTION, SOLUTION INTRAVENOUS at 14:16

## 2018-07-11 RX ADMIN — IBUPROFEN 600 MG: 600 TABLET ORAL at 15:29

## 2018-07-11 NOTE — DISCHARGE INSTRUCTIONS
you need repeat CT scan of the chest in 6 months set up by your family doctor to reassess your thoracic aneurysm as we discussed          Contusion in [facility]:   A contusion is a bruise that appears on your skin after an injury  A bruise happens when small blood vessels tear but skin does not  When blood vessels tear, blood leaks into nearby tissue, such as soft tissue or muscle  DISCHARGE INSTRUCTIONS:   Return to the emergency department if:   · You have new trouble moving the injured area  · You have tingling or numbness in or near the injured area  · Your hand or foot below the bruise gets cold or turns pale  Contact your healthcare provider if:   · You find a new lump in the injured area  · Your symptoms do not improve with treatment after 4 to 5 days  · You have questions or concerns about your condition or care  Medicines: You may need any of the following:  · NSAIDs  help decrease swelling and pain or fever  This medicine is available with or without a doctor's order  NSAIDs can cause stomach bleeding or kidney problems in certain people  If you take blood thinner medicine, always ask your healthcare provider if NSAIDs are safe for you  Always read the medicine label and follow directions  · Prescription pain medicine  may be given  Do not wait until the pain is severe before you take your medicine  · Take your medicine as directed  Contact your healthcare provider if you think your medicine is not helping or if you have side effects  Tell him of her if you are allergic to any medicine  Keep a list of the medicines, vitamins, and herbs you take  Include the amounts, and when and why you take them  Bring the list or the pill bottles to follow-up visits  Carry your medicine list with you in case of an emergency  Follow up with your healthcare provider as directed: You may need to return within a week to check your injury again   Write down your questions so you remember to ask them during your visits  Help a contusion heal:   · Rest the injured area  or use it less than usual  If you bruised your leg or foot, you may need crutches or a cane to help you walk  This will help you keep weight off your injured body part  · Apply ice  to decrease swelling and pain  Ice may also help prevent tissue damage  Use an ice pack, or put crushed ice in a plastic bag  Cover it with a towel and place it on your bruise for 15 to 20 minutes every hour or as directed  · Use compression  to support the area and decrease swelling  Wrap an elastic bandage around the area over the bruised muscle  Make sure the bandage is not too tight  You should be able to fit 1 finger between the bandage and your skin  · Elevate (raise) your injured body part  above the level of your heart to help decrease pain and swelling  Use pillows, blankets, or rolled towels to elevate the area as often as you can  · Do not drink alcohol  as directed  Alcohol may slow healing  · Do not stretch injured muscles  right after your injury  Ask your healthcare provider when and how you may safely stretch after your injury  Gentle stretches can help increase your flexibility  · Do not massage the area or put heating pads  on the bruise right after your injury  Heat and massage may slow healing  Your healthcare provider may tell you to apply heat after several days  At that time, heat will start to help the injury heal   Prevent another contusion:   · Stretch and warm up before you play sports or exercise  · Wear protective gear when you play sports  Examples are shin guards and padding  · If you begin a new physical activity, start slowly to give your body a chance to adjust   © 2017 [employer] Information is for End User's use only and may not be sold, redistributed or otherwise used for commercial purposes   All illustrations and images included in CareNotes® are the copyrighted property of [employer]  or [employer]az[employer]  The above information is an  only  It is not intended as medical advice for individual conditions or treatments  Talk to your doctor, nurse or pharmacist before following any medical regimen to see if it is safe and effective for you  Hypokalemia   WHAT YOU NEED TO KNOW:   Hypokalemia is a low level of potassium in your blood  Potassium helps control how your muscles, heart, and digestive system work  Hypokalemia occurs when your body loses too much potassium or does not absorb enough from food  DISCHARGE INSTRUCTIONS:   Return to the emergency department if:   · You cannot move your arm or leg  · You have a fast or irregular heartbeat  · You are too tired or weak to stand up  Contact your healthcare provider if:   · You are vomiting, or you have diarrhea  · You have numbness or tingling in your arms or legs  · Your symptoms do not go away or they get worse  · You have questions or concerns about your condition or care  Medicines:   · Potassium  will be given to bring your potassium levels back to normal     · Take your medicine as directed  Contact your healthcare provider if you think your medicine is not helping or if you have side effects  Tell him of her if you are allergic to any medicine  Keep a list of the medicines, vitamins, and herbs you take  Include the amounts, and when and why you take them  Bring the list or the pill bottles to follow-up visits  Carry your medicine list with you in case of an emergency  Eat foods that are high in potassium:  Foods that are high in potassium include bananas, oranges, tomatoes, potatoes, and avocado  Guillen beans, turkey, salmon, lean beef, yogurt, and milk are also high in potassium  Ask your healthcare provider or dietitian for more information about foods that are high in potassium     Follow up with your healthcare provider as directed:  Write down your questions so you remember to ask them during your visits  © 2017 [employer] Information is for End User's use only and may not be sold, redistributed or otherwise used for commercial purposes  All illustrations and images included in CareNotes® are the copyrighted property of [employer]  or [employer]az[employer]  The above information is an  only  It is not intended as medical advice for individual conditions or treatments  Talk to your doctor, nurse or pharmacist before following any medical regimen to see if it is safe and effective for you  Thoracic Aortic Aneurysm   WHAT YOU NEED TO KNOW:   A thoracic aortic aneurysm (TAA) is a bulge in your aorta that occurs when the aorta's walls are weakened  The aorta is a large blood vessel that extends from your heart down the center of your chest         DISCHARGE INSTRUCTIONS:   Follow up with your healthcare provider or vascular surgeon as directed: You may need to return to have your TAA checked  You may also need to have your blood pressure and cholesterol checked  Write down your questions so you remember to ask them during your visits  Contact your healthcare provider or vascular surgeon if:   · You have a fever  · You have new symptoms since your last appointment  · Your symptoms prevent you from doing your daily activities  · You have questions or concerns about your condition or care  Return to the emergency department if:   · You have nausea and are vomiting  · You have sudden chest, neck, back, or abdominal pain  · Your skin becomes pale and sweaty, or you feel very weak  · You are dizzy, or you faint  © 2017 [employer] Information is for End User's use only and may not be sold, redistributed or otherwise used for commercial purposes  All illustrations and images included in CareNotes® are the copyrighted property of [employer]  or [employer]azan    The above information is an  only  It is not intended as medical advice for individual conditions or treatments  Talk to your doctor, nurse or pharmacist before following any medical regimen to see if it is safe and effective for you

## 2018-07-11 NOTE — ED PROVIDER NOTES
History  Chief Complaint   Patient presents with    Chest Injury     Pt injured his chest while working on his lawnmower  This is a 29-year-old male who was pulling the cord on a  a few hours ago when it locked up and jammed the handle into his epigastric area he complains of pain no nausea vomiting shortness of breath        History provided by:  Patient  Injury   Location:   epigastric  Quality:   sharp pain  Severity:  Moderate  Onset quality:  Sudden  Duration:  2 hours  Timing:  Constant  Progression:  Unchanged  Chronicity:  New  Context:   injury from lawnmower handle  Relieved by:   nothing  Associated symptoms: abdominal pain ( palpation)        Prior to Admission Medications   Prescriptions Last Dose Informant Patient Reported? Taking? Nutritional Supplements (THERALITH XR) TABS  Self Yes Yes   Sig: Take by mouth 2 (two) times a day 2 tablets BID   chlorthalidone 25 mg tablet  Self Yes Yes   Sig: Take 25 mg by mouth daily   ezetimibe (ZETIA) 10 mg tablet  Self Yes Yes   Sig: Take 1 tablet by mouth daily   omeprazole (PriLOSEC) 20 mg delayed release capsule  Self Yes Yes   Sig: Take 20 mg by mouth daily as needed        Facility-Administered Medications: None       Past Medical History:   Diagnosis Date    GERD (gastroesophageal reflux disease)     Hyperlipidemia     Kidney stone     Meniere disease     Pulmonary embolism (HCC)        Past Surgical History:   Procedure Laterality Date    ABDOMINAL SURGERY      BACK SURGERY      COCHLEAR IMPLANT Right     HERNIA REPAIR      IVC FILTER INSERTION         History reviewed  No pertinent family history  I have reviewed and agree with the history as documented  Social History   Substance Use Topics    Smoking status: Never Smoker    Smokeless tobacco: Never Used    Alcohol use No        Review of Systems   Gastrointestinal: Positive for abdominal pain ( palpation)  All other systems reviewed and are negative        Physical Exam  Physical Exam   Constitutional: He is oriented to person, place, and time  He appears well-developed and well-nourished  No distress  HENT:   Head: Normocephalic and atraumatic  Nose: Nose normal    Mouth/Throat: Oropharynx is clear and moist    Eyes: EOM are normal  Pupils are equal, round, and reactive to light  No scleral icterus  Neck: No tracheal deviation present  Cardiovascular: Normal rate, regular rhythm and intact distal pulses  Exam reveals no gallop and no friction rub  No murmur heard  Pulmonary/Chest: Effort normal and breath sounds normal  No stridor  No respiratory distress  He has no wheezes  He has no rales  Abdominal: Soft  Bowel sounds are normal  He exhibits no distension  There is tenderness ( epigastric)  There is guarding  There is no rebound  Musculoskeletal: Normal range of motion  He exhibits no edema or deformity  Neurological: He is alert and oriented to person, place, and time  No cranial nerve deficit  Skin: Skin is warm and dry  No rash noted  He is not diaphoretic  Psychiatric: He has a normal mood and affect  His behavior is normal  Thought content normal    Nursing note and vitals reviewed        Vital Signs  ED Triage Vitals [07/11/18 1125]   Temperature Pulse Respirations Blood Pressure SpO2   98 2 °F (36 8 °C) 75 18 114/78 97 %      Temp src Heart Rate Source Patient Position - Orthostatic VS BP Location FiO2 (%)   -- Monitor Sitting Right arm --      Pain Score       4           Vitals:    07/11/18 1125   BP: 114/78   Pulse: 75   Patient Position - Orthostatic VS: Sitting       Visual Acuity      ED Medications  Medications   iohexol (OMNIPAQUE) 350 MG/ML injection (MULTI-DOSE) 100 mL (100 mL Intravenous Given 7/11/18 1416)   potassium chloride (K-DUR,KLOR-CON) CR tablet 40 mEq (40 mEq Oral Given 7/11/18 1529)   ibuprofen (MOTRIN) tablet 600 mg (600 mg Oral Given 7/11/18 1529)       Diagnostic Studies  Results Reviewed     Procedure Component Value Units Date/Time    POCT Chem 8+ [[id number]]  (Abnormal) Collected:  07/11/18 1324    Lab Status:  Final result Updated:  07/11/18 1329     SODIUM, I-STAT 141 mmol/l      Potassium, i-STAT 3 0 (L) mmol/L      Chloride, istat 99 (L) mmol/L      CO2, i-STAT 29 mmol/L      Anion Gap, Istat 17 (H) mmol/L      Calcium, Ionized i-STAT 1 07 (L) mmol/L      BUN, I-STAT 19 mg/dl      Creatinine, i-STAT 1 1 mg/dl      eGFR 71 ml/min/1 73sq m      Glucose, i-STAT 115 mg/dl      Hct, i-STAT 45 %      Hgb, i-STAT 15 3 g/dl      Specimen Type VENOUS                 CT chest and abdomen w contrast   Final Result by Arianna Dixon MD (07/11 1452)         1  No acute injury in the chest or abdomen  2   Bilateral nonobstructing renal calculi  3   Prominent ascending thoracic aorta  Follow-up study in 6 months recommended  Workstation performed: [id number]                    Procedures  Procedures       Phone Contacts  ED Phone Contact    ED Course                               MDM  CritCare Time    Disposition  Final diagnoses:   Abdominal contusion   Hypokalemia   Aortic aneurysm ([address] )     Time reflects when diagnosis was documented in both MDM as applicable and the Disposition within this note     Time User Action Codes Description Comment    7/11/2018  3:22 PM Delsie [place] Add [S30 1XXA] Abdominal contusion     7/11/2018  3:22 PM Delsie Martin Add [E87 6] Hypokalemia     7/11/2018  3:22 PM Delsie Martin Add [I71 9] Aortic aneurysm [facility])       ED Disposition     ED Disposition Condition Comment    Discharge  [address]  discharge to home/self care  Condition at discharge: Stable        Follow-up Information     Follow up With Specialties Details Why Contact Gillian Langston DO  In 1 day follow-up   will also need repeat CT scan of the chest to assess thoracic aneurysm in 6 months [address]  [address] [address]  [phone]            Discharge Medication List as of 7/11/2018 3:27 PM      START taking these medications    Details   potassium chloride (K-DUR,KLOR-CON) 10 mEq tablet Take 2 tablets (20 mEq total) by mouth daily for 5 days, Starting Wed 7/11/2018, Until Mon 7/16/2018, Print         CONTINUE these medications which have NOT CHANGED    Details   chlorthalidone 25 mg tablet Take 25 mg by mouth daily, Historical Med      ezetimibe (ZETIA) 10 mg tablet Take 1 tablet by mouth daily, Starting Wed 5/3/2017, Historical Med      Nutritional Supplements (THERALITH XR) TABS Take by mouth 2 (two) times a day 2 tablets BID, Historical Med      omeprazole (PriLOSEC) 20 mg delayed release capsule Take 20 mg by mouth daily as needed  , Historical Med           No discharge procedures on file      ED Provider  Electronically Signed by           Melinda Clemens DO  07/12/18 0897

## 2018-07-23 DIAGNOSIS — E78.00 PURE HYPERCHOLESTEROLEMIA: ICD-10-CM

## 2018-07-23 RX ORDER — EZETIMIBE 10 MG/1
TABLET ORAL
Qty: 90 TABLET | Refills: 0 | Status: SHIPPED | OUTPATIENT
Start: 2018-07-23 | End: 2018-11-19 | Stop reason: SDUPTHER

## 2018-08-22 ENCOUNTER — TRANSCRIBE ORDERS (OUTPATIENT)
Dept: ADMINISTRATIVE | Facility: HOSPITAL | Age: 63
End: 2018-08-22

## 2018-08-22 ENCOUNTER — OFFICE VISIT (OUTPATIENT)
Dept: CARDIOLOGY CLINIC | Facility: CLINIC | Age: 63
End: 2018-08-22
Payer: COMMERCIAL

## 2018-08-22 VITALS
HEIGHT: 67 IN | SYSTOLIC BLOOD PRESSURE: 130 MMHG | DIASTOLIC BLOOD PRESSURE: 82 MMHG | BODY MASS INDEX: 32.83 KG/M2 | HEART RATE: 60 BPM | WEIGHT: 209.2 LBS

## 2018-08-22 DIAGNOSIS — I77.810 MILD ASCENDING AORTA DILATATION (HCC): ICD-10-CM

## 2018-08-22 DIAGNOSIS — R07.9 CHEST PAIN, UNSPECIFIED TYPE: ICD-10-CM

## 2018-08-22 DIAGNOSIS — E78.00 PURE HYPERCHOLESTEROLEMIA: Primary | ICD-10-CM

## 2018-08-22 PROCEDURE — 99214 OFFICE O/P EST MOD 30 MIN: CPT | Performed by: INTERNAL MEDICINE

## 2018-11-19 DIAGNOSIS — E78.00 PURE HYPERCHOLESTEROLEMIA: ICD-10-CM

## 2018-11-19 RX ORDER — EZETIMIBE 10 MG/1
TABLET ORAL
Qty: 90 TABLET | Refills: 0 | Status: SHIPPED | OUTPATIENT
Start: 2018-11-19 | End: 2019-02-15 | Stop reason: SDUPTHER

## 2018-12-06 ENCOUNTER — OFFICE VISIT (OUTPATIENT)
Dept: URGENT CARE | Facility: CLINIC | Age: 63
End: 2018-12-06
Payer: COMMERCIAL

## 2018-12-06 ENCOUNTER — HOSPITAL ENCOUNTER (EMERGENCY)
Facility: HOSPITAL | Age: 63
Discharge: HOME | End: 2018-12-06
Attending: EMERGENCY MEDICINE
Payer: COMMERCIAL

## 2018-12-06 ENCOUNTER — APPOINTMENT (EMERGENCY)
Dept: CT IMAGING | Facility: HOSPITAL | Age: 63
End: 2018-12-06
Payer: COMMERCIAL

## 2018-12-06 VITALS
HEART RATE: 50 BPM | HEIGHT: 67 IN | DIASTOLIC BLOOD PRESSURE: 70 MMHG | TEMPERATURE: 96.9 F | OXYGEN SATURATION: 99 % | RESPIRATION RATE: 20 BRPM | BODY MASS INDEX: 33.9 KG/M2 | SYSTOLIC BLOOD PRESSURE: 125 MMHG | WEIGHT: 216 LBS

## 2018-12-06 VITALS
WEIGHT: 216 LBS | TEMPERATURE: 97.1 F | HEIGHT: 67 IN | SYSTOLIC BLOOD PRESSURE: 128 MMHG | RESPIRATION RATE: 18 BRPM | OXYGEN SATURATION: 97 % | DIASTOLIC BLOOD PRESSURE: 80 MMHG | BODY MASS INDEX: 33.9 KG/M2 | HEART RATE: 75 BPM

## 2018-12-06 DIAGNOSIS — K29.70 GASTRITIS: Primary | ICD-10-CM

## 2018-12-06 DIAGNOSIS — R10.13 EPIGASTRIC PAIN: Primary | ICD-10-CM

## 2018-12-06 LAB
ALBUMIN SERPL BCP-MCNC: 3.7 G/DL (ref 3.5–5)
ALP SERPL-CCNC: 97 U/L (ref 46–116)
ALT SERPL W P-5'-P-CCNC: 49 U/L (ref 12–78)
ANION GAP SERPL CALCULATED.3IONS-SCNC: 9 MMOL/L (ref 4–13)
AST SERPL W P-5'-P-CCNC: 28 U/L (ref 5–45)
ATRIAL RATE: 57 BPM
BASOPHILS # BLD AUTO: 0.04 THOUSANDS/ΜL (ref 0–0.1)
BASOPHILS NFR BLD AUTO: 1 % (ref 0–1)
BILIRUB SERPL-MCNC: 0.8 MG/DL (ref 0.2–1)
BUN SERPL-MCNC: 19 MG/DL (ref 5–25)
CALCIUM SERPL-MCNC: 8.9 MG/DL (ref 8.3–10.1)
CHLORIDE SERPL-SCNC: 99 MMOL/L (ref 100–108)
CO2 SERPL-SCNC: 32 MMOL/L (ref 21–32)
CREAT SERPL-MCNC: 1.1 MG/DL (ref 0.6–1.3)
EOSINOPHIL # BLD AUTO: 0.12 THOUSAND/ΜL (ref 0–0.61)
EOSINOPHIL NFR BLD AUTO: 2 % (ref 0–6)
ERYTHROCYTE [DISTWIDTH] IN BLOOD BY AUTOMATED COUNT: 13.1 % (ref 11.6–15.1)
GFR SERPL CREATININE-BSD FRML MDRD: 71 ML/MIN/1.73SQ M
GLUCOSE SERPL-MCNC: 94 MG/DL (ref 65–140)
HCT VFR BLD AUTO: 46.6 % (ref 36.5–49.3)
HGB BLD-MCNC: 15.9 G/DL (ref 12–17)
IMM GRANULOCYTES # BLD AUTO: 0.01 THOUSAND/UL (ref 0–0.2)
IMM GRANULOCYTES NFR BLD AUTO: 0 % (ref 0–2)
LIPASE SERPL-CCNC: 122 U/L (ref 73–393)
LYMPHOCYTES # BLD AUTO: 1.78 THOUSANDS/ΜL (ref 0.6–4.47)
LYMPHOCYTES NFR BLD AUTO: 27 % (ref 14–44)
MCH RBC QN AUTO: 31.8 PG (ref 26.8–34.3)
MCHC RBC AUTO-ENTMCNC: 34.1 G/DL (ref 31.4–37.4)
MCV RBC AUTO: 93 FL (ref 82–98)
MONOCYTES # BLD AUTO: 0.58 THOUSAND/ΜL (ref 0.17–1.22)
MONOCYTES NFR BLD AUTO: 9 % (ref 4–12)
NEUTROPHILS # BLD AUTO: 3.96 THOUSANDS/ΜL (ref 1.85–7.62)
NEUTS SEG NFR BLD AUTO: 61 % (ref 43–75)
NRBC BLD AUTO-RTO: 0 /100 WBCS
P AXIS: -2 DEGREES
PLATELET # BLD AUTO: 249 THOUSANDS/UL (ref 149–390)
PMV BLD AUTO: 10.4 FL (ref 8.9–12.7)
POTASSIUM SERPL-SCNC: 2.9 MMOL/L (ref 3.5–5.3)
PR INTERVAL: 158 MS
PROT SERPL-MCNC: 8.2 G/DL (ref 6.4–8.2)
QRS AXIS: 3 DEGREES
QRSD INTERVAL: 90 MS
QT INTERVAL: 442 MS
QTC INTERVAL: 430 MS
RBC # BLD AUTO: 5 MILLION/UL (ref 3.88–5.62)
SODIUM SERPL-SCNC: 140 MMOL/L (ref 136–145)
T WAVE AXIS: 1 DEGREES
TROPONIN I SERPL-MCNC: <0.02 NG/ML
VENTRICULAR RATE: 57 BPM
WBC # BLD AUTO: 6.49 THOUSAND/UL (ref 4.31–10.16)

## 2018-12-06 PROCEDURE — 80053 COMPREHEN METABOLIC PANEL: CPT | Performed by: EMERGENCY MEDICINE

## 2018-12-06 PROCEDURE — 96360 HYDRATION IV INFUSION INIT: CPT

## 2018-12-06 PROCEDURE — 36415 COLL VENOUS BLD VENIPUNCTURE: CPT | Performed by: EMERGENCY MEDICINE

## 2018-12-06 PROCEDURE — 93005 ELECTROCARDIOGRAM TRACING: CPT

## 2018-12-06 PROCEDURE — 71275 CT ANGIOGRAPHY CHEST: CPT

## 2018-12-06 PROCEDURE — 85025 COMPLETE CBC W/AUTO DIFF WBC: CPT | Performed by: EMERGENCY MEDICINE

## 2018-12-06 PROCEDURE — 93005 ELECTROCARDIOGRAM TRACING: CPT | Performed by: NURSE PRACTITIONER

## 2018-12-06 PROCEDURE — 93010 ELECTROCARDIOGRAM REPORT: CPT | Performed by: INTERNAL MEDICINE

## 2018-12-06 PROCEDURE — 84484 ASSAY OF TROPONIN QUANT: CPT | Performed by: EMERGENCY MEDICINE

## 2018-12-06 PROCEDURE — 96361 HYDRATE IV INFUSION ADD-ON: CPT

## 2018-12-06 PROCEDURE — 83690 ASSAY OF LIPASE: CPT | Performed by: EMERGENCY MEDICINE

## 2018-12-06 PROCEDURE — 99213 OFFICE O/P EST LOW 20 MIN: CPT | Performed by: NURSE PRACTITIONER

## 2018-12-06 PROCEDURE — 99284 EMERGENCY DEPT VISIT MOD MDM: CPT

## 2018-12-06 PROCEDURE — 74174 CTA ABD&PLVS W/CONTRAST: CPT

## 2018-12-06 RX ORDER — RANITIDINE 150 MG/1
150 TABLET ORAL 2 TIMES DAILY
COMMUNITY
End: 2020-07-08 | Stop reason: ALTCHOICE

## 2018-12-06 RX ADMIN — IOHEXOL 100 ML: 350 INJECTION, SOLUTION INTRAVENOUS at 16:59

## 2018-12-06 RX ADMIN — SODIUM CHLORIDE 1000 ML: 0.9 INJECTION, SOLUTION INTRAVENOUS at 15:34

## 2018-12-06 NOTE — ED PROVIDER NOTES
History  Chief Complaint   Patient presents with    Epigastric Pain     Melina sent here from [facility] now with chronic GERD and epigastric pain  Indigestiopn for about a week and the pain ahas been intermittent     61year old with history of GERD, hyperlipidemia, and previous PE, presenting to the ED with epigastric pain and indigestion x 1 week  He states the pain and indigestion occur intermittently and typically after a meal or if he is lying down  He states he has substernal, sharp, non-radiating pain that comes with the indigestion  He has a history of indigestion, for which he normally takes Pepsid, but he ran out, so he has been taking Jerral Hector as needed  He states he has been belching a lot more frequently  He states the pain is not worse with a deep breath  He denies SOB, fevers, chills, nausea, vomiting, diarrhea, or constipation  He denies recent travel or new foods  He denies alcohol use  He has a family history of CAD  History provided by:  Patient  Heartburn   Location:  Substernal   Quality:  Sharp  Severity:  Moderate  Duration:  1 week  Timing:  Intermittent  Progression:  Worsening  Chronicity:  Chronic  Context:  H/o indigestion and GERD, states he normally takes Pepsid and the pain resolves  This pain is more intense than his usual indigestion  Worsened by:  Eating  Ineffective treatments:  Xantac  Associated symptoms: abdominal pain, chest pain and nausea    Associated symptoms: no congestion, no cough, no diarrhea, no fever, no rash, no shortness of breath and no vomiting        Prior to Admission Medications   Prescriptions Last Dose Informant Patient Reported? Taking?    Nutritional Supplements (THERALITH XR) TABS  Self Yes Yes   Sig: Take by mouth 2 (two) times a day 2 tablets BID   chlorthalidone 25 mg tablet  Self Yes Yes   Sig: Take 25 mg by mouth daily   ezetimibe (ZETIA) 10 mg tablet   No Yes   Sig: TAKE 1 TABLET BY MOUTH EVERY DAY   ranitidine (ZANTAC) 150 mg tablet   Yes Yes Sig: Take 150 mg by mouth 2 (two) times a day      Facility-Administered Medications: None       Past Medical History:   Diagnosis Date    GERD (gastroesophageal reflux disease)     Hyperlipidemia     Kidney stone     Meniere disease     Pulmonary embolism (HCC)        Past Surgical History:   Procedure Laterality Date    ABDOMINAL SURGERY      BACK SURGERY      COCHLEAR IMPLANT Right     HERNIA REPAIR      IVC FILTER INSERTION         History reviewed  No pertinent family history  I have reviewed and agree with the history as documented  Social History   Substance Use Topics    Smoking status: Never Smoker    Smokeless tobacco: Never Used    Alcohol use No        Review of Systems   Constitutional: Negative for fever  HENT: Negative for congestion  Respiratory: Negative for cough and shortness of breath  Cardiovascular: Positive for chest pain  Gastrointestinal: Positive for abdominal pain and nausea  Negative for diarrhea and vomiting  Skin: Negative for rash  All other systems reviewed and are negative  Physical Exam  Physical Exam   Constitutional: He is oriented to person, place, and time  He appears well-developed and well-nourished  HENT:   Mouth/Throat: Oropharynx is clear and moist    Eyes: Pupils are equal, round, and reactive to light  Conjunctivae and EOM are normal    Neck: Normal range of motion  Neck supple  No spinous process tenderness present  Cardiovascular: Normal rate, regular rhythm, normal heart sounds and intact distal pulses  Pulmonary/Chest: Effort normal and breath sounds normal  No respiratory distress  He has no wheezes  He exhibits no tenderness  Abdominal: Soft  Bowel sounds are normal  He exhibits no distension  There is tenderness in the epigastric area  There is no rebound and no guarding  Musculoskeletal: Normal range of motion  Neurological: He is alert and oriented to person, place, and time  He has normal strength   No sensory deficit  GCS eye subscore is 4  GCS verbal subscore is 5  GCS motor subscore is 6  Skin: Skin is warm and dry  No rash noted  Psychiatric: He has a normal mood and affect  Nursing note and vitals reviewed        Vital Signs  ED Triage Vitals   Temperature Pulse Respirations Blood Pressure SpO2   12/06/18 1443 12/06/18 1443 12/06/18 1443 12/06/18 1443 12/06/18 1443   (!) 96 4 °F (35 8 °C) 64 20 (!) 184/88 96 %      Temp Source Heart Rate Source Patient Position - Orthostatic VS BP Location FiO2 (%)   12/06/18 1600 12/06/18 1500 12/06/18 1500 12/06/18 1500 --   Temporal Monitor Lying Right arm       Pain Score       12/06/18 1443       1           Vitals:    12/06/18 1530 12/06/18 1534 12/06/18 1600 12/06/18 1823   BP:  131/78 145/78 125/70   Pulse: 56  55 (!) 50   Patient Position - Orthostatic VS:  Lying Lying Sitting       Visual Acuity      ED Medications  Medications   sodium chloride 0 9 % bolus 1,000 mL (0 mL Intravenous Stopped 12/6/18 1822)   iohexol (OMNIPAQUE) 350 MG/ML injection (MULTI-DOSE) 100 mL (100 mL Intravenous Given 12/6/18 1659)       Diagnostic Studies  Results Reviewed     Procedure Component Value Units Date/Time    CMP [[id number]]  (Abnormal) Collected:  12/06/18 1450    Lab Status:  Final result Specimen:  Blood from Line, Venous Updated:  12/06/18 1635     Sodium 140 mmol/L      Potassium 2 9 (L) mmol/L      Chloride 99 (L) mmol/L      CO2 32 mmol/L      ANION GAP 9 mmol/L      BUN 19 mg/dL      Creatinine 1 10 mg/dL      Glucose 94 mg/dL      Calcium 8 9 mg/dL      AST 28 U/L      ALT 49 U/L      Alkaline Phosphatase 97 U/L      Total Protein 8 2 g/dL      Albumin 3 7 g/dL      Total Bilirubin 0 80 mg/dL      eGFR 71 ml/min/1 73sq m     Narrative:         National Kidney Disease Education Program recommendations are as follows:  GFR calculation is accurate only with a steady state creatinine  Chronic Kidney disease less than 60 ml/min/1 73 sq  meters  Kidney failure less than 15 ml/min/1 73 sq  meters  Lipase [[id number]]  (Normal) Collected:  12/06/18 1450    Lab Status:  Final result Specimen:  Blood from Line, Venous Updated:  12/06/18 1635     Lipase 122 u/L     Troponin I [[id number]]  (Normal) Collected:  12/06/18 1450    Lab Status:  Final result Specimen:  Blood from Line, Venous Updated:  12/06/18 1627     Troponin I <0 02 ng/mL     CBC and differential [[id number]] Collected:  12/06/18 1450    Lab Status:  Final result Specimen:  Blood from Line, Venous Updated:  12/06/18 1606     WBC 6 49 Thousand/uL      RBC 5 00 Million/uL      Hemoglobin 15 9 g/dL      Hematocrit 46 6 %      MCV 93 fL      MCH 31 8 pg      MCHC 34 1 g/dL      RDW 13 1 %      MPV 10 4 fL      Platelets 173 Thousands/uL      nRBC 0 /100 WBCs      Neutrophils Relative 61 %      Immat GRANS % 0 %      Lymphocytes Relative 27 %      Monocytes Relative 9 %      Eosinophils Relative 2 %      Basophils Relative 1 %      Neutrophils Absolute 3 96 Thousands/µL      Immature Grans Absolute 0 01 Thousand/uL      Lymphocytes Absolute 1 78 Thousands/µL      Monocytes Absolute 0 58 Thousand/µL      Eosinophils Absolute 0 12 Thousand/µL      Basophils Absolute 0 04 Thousands/µL                  CTA chest abdomen pelvis w wo contrast   Final Result by Yuval Escamilla MD (12/06 1738)      Left basilar subpleural 2 2 cm lung opacity could relate pulmonary infarct from small nonvisualized subsegmental pulmonary embolus, rounded atelectasis, or less likely infection if clinically suspected  Posttreatment follow-up chest CT recommended in 2-3    months  Ectatic/aneurysmal 4 3 x 3 9 cm ascending thoracic aorta  No evidence of dissection  No evidence of dissection  Follow-up CTA chest in 6 months  Fractured/fragmented IVC filter which is unchanged since 2013  Bilateral nonobstructing subcentimeter renal calculi                       Workstation performed: [id number]                    Procedures  ECG 12 Lead Documentation  Date/Time: 12/6/2018 5:37 PM  Performed by: Yvette Bowman by: Dandre Garcia     Indications / Diagnosis:  Epigastric pain  ECG reviewed by me, the ED Provider: yes    Patient location:  ED  Previous ECG:     Previous ECG:  Compared to current    Comparison ECG info:  2-17    Similarity:  No change  Interpretation:     Interpretation: normal    Rate:     ECG rate:  57    ECG rate assessment: normal    Rhythm:     Rhythm: sinus bradycardia    Ectopy:     Ectopy: none    QRS:     QRS axis:  Normal    QRS intervals:  Normal  Conduction:     Conduction: normal    ST segments:     ST segments:  Normal  T waves:     T waves: normal               Phone Contacts  ED Phone Contact    ED Course  ED Course as of Dec 06 1945   Thu Dec 06, 2018   5028 Reviewed ct scan, broken IVC filter - will confirm if new since previous scan  - waiting for call back from radiology                                MDM  Number of Diagnoses or Management Options  Gastritis: established and worsening     Amount and/or Complexity of Data Reviewed  Clinical lab tests: ordered and reviewed  Tests in the radiology section of CPT®: ordered and reviewed    Patient Progress  Patient progress: improved    [facility]Care Time    Disposition  Final diagnoses:   Gastritis - acute exacerbation     Time reflects when diagnosis was documented in both MDM as applicable and the Disposition within this note     Time User Action Codes Description Comment    12/6/2018  5:59 PM Larance Megan Add [K29 70] Gastritis     12/6/2018  5:59 PM Larance Megan Modify [K29 70] Gastritis acute exacerbation      ED Disposition     ED Disposition Condition Comment    Discharge  [address]  discharge to home/self care      Condition at discharge: Good        Follow-up Information     Follow up With Specialties Details Why Contact Info    your gastroenterologist  In 4 days            Discharge Medication List as of 12/6/2018  6:14 PM      CONTINUE these medications which have NOT CHANGED    Details   chlorthalidone 25 mg tablet Take 25 mg by mouth daily, Historical Med      ezetimibe (ZETIA) 10 mg tablet TAKE 1 TABLET BY MOUTH EVERY DAY, Normal      Nutritional Supplements (THERALITH XR) TABS Take by mouth 2 (two) times a day 2 tablets BID, Historical Med      ranitidine (ZANTAC) 150 mg tablet Take 150 mg by mouth 2 (two) times a day, Historical Med           No discharge procedures on file      ED Provider  Electronically Signed by           Charan Walton DO  12/06/18 1946

## 2018-12-06 NOTE — DISCHARGE INSTRUCTIONS
Gastritis   WHAT YOU NEED TO KNOW:   Gastritis is inflammation or irritation of the lining of your stomach  DISCHARGE INSTRUCTIONS:   Call 911 for any of the following:   · You develop chest pain or shortness of breath  Seek care immediately if:   · You vomit blood  · You have black or bloody bowel movements  · You have severe stomach or back pain  Contact your healthcare provider if:   · You have a fever  · You have new or worsening symptoms, even after treatment  · You have questions or concerns about your condition or care  Medicines:   · Medicines  may be given to help treat a bacterial infection or decrease stomach acid  · Take your medicine as directed  Contact your healthcare provider if you think your medicine is not helping or if you have side effects  Tell him or her if you are allergic to any medicine  Keep a list of the medicines, vitamins, and herbs you take  Include the amounts, and when and why you take them  Bring the list or the pill bottles to follow-up visits  Carry your medicine list with you in case of an emergency  Manage or prevent gastritis:   · Do not smoke  Nicotine and other chemicals in cigarettes and cigars can make your symptoms worse and cause lung damage  Ask your healthcare provider for information if you currently smoke and need help to quit  E-cigarettes or smokeless tobacco still contain nicotine  Talk to your healthcare provider before you use these products  · Do not drink alcohol  Alcohol can prevent healing and make your gastritis worse  Talk to your healthcare provider if you need help to stop drinking  · Do not take NSAIDs or aspirin unless directed  These and similar medicines can cause irritation  If your healthcare provider says it is okay to take NSAIDs, take them with food  · Do not eat foods that cause irritation  Foods such as oranges and salsa can cause burning or pain  Eat a variety of healthy foods   Examples include fruits (not citrus), vegetables, low-fat dairy products, beans, whole-grain breads, and lean meats and fish  Try to eat small meals, and drink water with your meals  Do not eat for at least 3 hours before you go to bed  · Find ways to relax and decrease stress  Stress can increase stomach acid and make gastritis worse  Activities such as yoga, meditation, or listening to music can help you relax  Spend time with friends, or do things you enjoy  Follow up with your healthcare provider as directed: You may need ongoing tests or treatment, or referral to a gastroenterologist  Write down your questions so you remember to ask them during your visits  © 2017 [employer]  Information is for End User's use only and may not be sold, redistributed or otherwise used for commercial purposes  All illustrations and images included in CareNotes® are the copyrighted property of A [employer] Inc  or [employer]  The above information is an  only  It is not intended as medical advice for individual conditions or treatments  Talk to your doctor, nurse or pharmacist before following any medical regimen to see if it is safe and effective for you      Follow-up with Dr Antwan Xie concerning thoracic aneurysm 4 3 cm  Review other finding of CT scan with primary care (lung scarring, IVC filter broken)

## 2018-12-06 NOTE — PROGRESS NOTES
NAME: Shayy Evangelista  is a 61 y o  male  : 1955    MRN: [id number]      Assessment and Plan   Epigastric pain [R10 13]  1  Epigastric pain  POCT ECG    Transfer to other facility       Jose Angel Mobley was seen today for heartburn  Diagnoses and all orders for this visit:    Epigastric pain  -     POCT ECG  -     Transfer to other facility    EKG done in the office, sinus reese noted, HR 57bpm      Patient Instructions   Patient Instructions   Pt going to the [facility] for further evaluation, epigstric pain present for over one week, no relief with meds   Pt agreed to go  Discussed having a GI cocktail here and seeing if it helped with pain, however pt will go to the ER for further evaluation  EKG done, sinus reese noted  Pt drove self to the ER    Proceed to ER if symptoms worsen  Chief Complaint     Chief Complaint   Patient presents with    Heartburn     Pt states he has had indigestion with some nausea  Has been taking zantac without any relief  States he has occassional abdominal pain at midline  Occassional lightheadness as well but does not feel he could pass out  History of stable AAA  History of Present Illness     Pt her etoday for indigestion that has been present for one week, centralized in the epigastric area and some pain, sharp In nature  Some radiation of symptoms to the left side of the epigastic area  Had some nausea for the past week intermittent, no vomiting, and no change in bowel habits  Denies eating new foods, doesn't drink and doesn't smoke  Denies drugs as well  The symptoms worsen after he eats and makes him very uncomfortable, Never had these symptoms prior  Denies having back pain and no CP or SOB  He has no discomfort when taking a deep breath  Hx of 4 1 ascending aorta Aneurysm dx in 2018, seen his cardiologist at that time and schedule for echo in one year, no f/u CT was made  He recently did a lot of walking and hiking this past few days   The sun, mon, and Tuesday after thanksgiving he was hiking a lot and not sure if that was the case, he is an active judi  He also had a hemorrhoid banding done by GI in [address] [place] [address] and was done on 11/28/2018  He was also RX Prilosec and hasn't taken it but has been taking Zantac and having no relief  Hx of hernia abdominal surgery in the past for hernias  Review of Systems   Review of Systems   Constitutional: Negative for fatigue and fever  HENT: Negative  Eyes: Negative  Respiratory: Negative  Cardiovascular: Negative  Gastrointestinal: Positive for abdominal pain  Negative for abdominal distention, anal bleeding, blood in stool, constipation, diarrhea, nausea, rectal pain and vomiting  Genitourinary: Negative  Musculoskeletal: Negative for back pain  Skin: Negative  Neurological: Positive for light-headedness (at times)           Current Medications       Current Outpatient Prescriptions:     chlorthalidone 25 mg tablet, Take 25 mg by mouth daily, Disp: , Rfl:     ezetimibe (ZETIA) 10 mg tablet, TAKE 1 TABLET BY MOUTH EVERY DAY, Disp: 90 tablet, Rfl: 0    Nutritional Supplements (THERALITH XR) TABS, Take by mouth 2 (two) times a day 2 tablets BID, Disp: , Rfl:     omeprazole (PriLOSEC) 20 mg delayed release capsule, Take 20 mg by mouth daily as needed  , Disp: , Rfl:     Current Allergies     Allergies as of 12/06/2018 - Reviewed 12/06/2018   Allergen Reaction Noted    Moxifloxacin Shortness Of Breath and Hives 10/16/2013    Alcohol  02/08/2017    Caffeine  05/27/2016    Cephalosporins  05/27/2016    Doxycycline      Penicillins Hives 07/30/2015    Pravastatin      Tamiflu [oseltamivir] Lightheadedness 04/04/2018              Past Medical History:   Diagnosis Date    GERD (gastroesophageal reflux disease)     Hyperlipidemia     Kidney stone     Meniere disease     Pulmonary embolism ([address] )        Past Surgical History:   Procedure Laterality Date    ABDOMINAL SURGERY  BACK SURGERY      COCHLEAR IMPLANT Right     HERNIA REPAIR      IVC FILTER INSERTION         No family history on file  Medications have been verified  The following portions of the patient's history were reviewed and updated as appropriate: allergies, current medications, past family history, past medical history, past social history, past surgical history and problem list     Objective   /80   Pulse 75   Temp (!) 97 1 °F (36 2 °C)   Resp 18   Ht 5' 7" (1 702 m)   Wt 98 kg (216 lb)   SpO2 97%   BMI 33 83 kg/m²      Physical Exam     Physical Exam   Constitutional: He is oriented to person, place, and time  He appears well-developed and well-nourished  He is cooperative  HENT:   Right Ear: Decreased hearing is noted  Right sided cochelear implant noted  Cochlear implant, R side only,    Cardiovascular: Regular rhythm and normal pulses  Bradycardia present  Pulmonary/Chest: Effort normal and breath sounds normal  He has no decreased breath sounds  He has no wheezes  He has no rhonchi  He has no rales  Abdominal: Soft  Normal appearance and bowel sounds are normal  He exhibits no distension and no ascites  There is tenderness in the epigastric area  There is no rebound and no CVA tenderness  Neurological: He is alert and oriented to person, place, and time         MAGDALENA Denis

## 2018-12-06 NOTE — PATIENT INSTRUCTIONS
Pt going to the [facility] for further evaluation, epigstric pain present for over one week, no relief with meds   Pt agreed to go  Discussed having a GI cocktail here and seeing if it helped with pain, however pt will go to the ER for further evaluation  EKG done, sinus reese noted       Pt drove self to the ER

## 2018-12-07 LAB
ATRIAL RATE: 59 BPM
P AXIS: 51 DEGREES
PR INTERVAL: 162 MS
QRS AXIS: 48 DEGREES
QRSD INTERVAL: 90 MS
QT INTERVAL: 454 MS
QTC INTERVAL: 449 MS
T WAVE AXIS: 27 DEGREES
VENTRICULAR RATE: 59 BPM

## 2018-12-07 PROCEDURE — 93010 ELECTROCARDIOGRAM REPORT: CPT | Performed by: INTERNAL MEDICINE

## 2018-12-10 ENCOUNTER — OFFICE VISIT (OUTPATIENT)
Dept: CARDIOLOGY CLINIC | Facility: CLINIC | Age: 63
End: 2018-12-10
Payer: COMMERCIAL

## 2018-12-10 VITALS
DIASTOLIC BLOOD PRESSURE: 88 MMHG | BODY MASS INDEX: 33.59 KG/M2 | HEIGHT: 67 IN | WEIGHT: 214 LBS | HEART RATE: 64 BPM | SYSTOLIC BLOOD PRESSURE: 142 MMHG

## 2018-12-10 DIAGNOSIS — I77.810 MILD ASCENDING AORTA DILATATION (HCC): ICD-10-CM

## 2018-12-10 DIAGNOSIS — R07.9 CHEST PAIN, UNSPECIFIED TYPE: ICD-10-CM

## 2018-12-10 DIAGNOSIS — E78.00 PURE HYPERCHOLESTEROLEMIA: Primary | ICD-10-CM

## 2018-12-10 PROCEDURE — 99214 OFFICE O/P EST MOD 30 MIN: CPT | Performed by: INTERNAL MEDICINE

## 2018-12-10 RX ORDER — OMEPRAZOLE 20 MG/1
20 CAPSULE, DELAYED RELEASE ORAL DAILY
COMMUNITY

## 2018-12-10 NOTE — PROGRESS NOTES
Cardiology Follow Up    Tatyana Vitale   1955  [id number]  [address] [facility] [address] [facility] [address] [facility]  [phone]    1  Pure hypercholesterolemia     2  Chest pain, unspecified type     3  Mild ascending aorta dilatation (HCC)         Interval History:   Patient is here for an unscheduled visit  He was recently in the emergency room with epigastric discomfort with indigestion and nausea  He went on to have a cardiac workup which was negative  A CTA of the chest and abdomen was performed which demonstrated a stable mild ascending thoracic aneurysm measuring 4 3 x 3 9 cm  His most recent echocardiogram done September 2017 demonstrated preserved LV systolic function and no significant valvular heart disease  He was most recently seen by me in August 2018  He has a history of statin intolerance  He does have a history of IVC filter placement in reference to prior veno-thromboembolism  His EKG demonstrated sinus rhythm and no obvious ischemia  It was felt possibly that he had a GI disorder  He went hunting this weekend and while he was hunting out in the cold he developed some lightheadedness and some chest pressure  He is here for a cardiac evaluation  He has had no problem since  There is no problem list on file for this patient  Past Medical History:   Diagnosis Date    GERD (gastroesophageal reflux disease)     Hyperlipidemia     Kidney stone     Meniere disease     Pulmonary embolism (HCC)      Social History     Social History    Marital status:      Spouse name: N/A    Number of children: N/A    Years of education: N/A     Occupational History    Not on file       Social History Main Topics    Smoking status: Never Smoker    Smokeless tobacco: Never Used    Alcohol use No    Drug use: No    Sexual activity: Not on file     Other Topics Concern    Not on file Social History Narrative    No narrative on file      No family history on file  Past Surgical History:   Procedure Laterality Date    ABDOMINAL SURGERY      BACK SURGERY      COCHLEAR IMPLANT Right     HERNIA REPAIR      IVC FILTER INSERTION         Current Outpatient Prescriptions:     chlorthalidone 25 mg tablet, Take 25 mg by mouth daily, Disp: , Rfl:     ezetimibe (ZETIA) 10 mg tablet, TAKE 1 TABLET BY MOUTH EVERY DAY, Disp: 90 tablet, Rfl: 0    Nutritional Supplements (THERALITH XR) TABS, Take by mouth 2 (two) times a day 2 tablets BID, Disp: , Rfl:     omeprazole (PriLOSEC) 20 mg delayed release capsule, Take 20 mg by mouth daily, Disp: , Rfl:     ranitidine (ZANTAC) 150 mg tablet, Take 150 mg by mouth 2 (two) times a day, Disp: , Rfl:   Allergies   Allergen Reactions    Moxifloxacin Shortness Of Breath and Hives    Alcohol      vertigo    Caffeine      vertigo    Cephalosporins      hyperventilation    Doxycycline     Penicillins Hives    Pravastatin      Other reaction(s): Respiratory Distress    Tamiflu [Oseltamivir] Lightheadedness     Hyperventilation       Labs:not applicable  Imaging: Cta Chest Abdomen Pelvis W Wo Contrast    Result Date: 12/6/2018  Narrative: CT ANGIOGRAM OF THE CHEST, ABDOMEN AND PELVIS WITH AND WITHOUT IV CONTRAST INDICATION:  Abdominal pain, unspecified, right upper quadrant abdominal pain COMPARISON: July 11, 2018 and October 4, 2013 TECHNIQUE:  CT angiogram examination of the chest, abdomen and pelvis was performed according to standard protocol  This examination, like all CT scans performed in the [facility], was performed utilizing techniques to minimize radiation dose exposure, including the use of iterative reconstruction and automated exposure control  Contrast as well as noncontrast images were obtained  3D reconstructions were performed an independent workstation, and are supplied for review    Rad dose 2653 19 mGy-cm IV Contrast:  100 mL of iohexol (OMNIPAQUE) Enteric Contrast: FINDINGS: VASCULAR STRUCTURES:  Ectatic/aneurysmal 4 3 x 3 9 cm ascending thoracic aorta  No evidence of dissection  Fragmented high density structure within the IVC possibly representing a fractured IVC filter which appears to puncture the right anterior portion of the IVC although this is unchanged from studies dating back to 2013  No evidence of pulmonary embolus  OTHER FINDINGS: CHEST: HEART: Top normal heart size, mild hepatomegaly  LUNGS:  Left basilar subpleural 2 2 cm lung opacity could relate pulmonary infarct from small nonvisualized subsegmental pulmonary embolus, rounded atelectasis, or less likely infection if clinically suspected  Posttreatment follow-up chest CT recommended in 2-3 months  PLEURA: No pleural effusion  MEDIASTINUM AND TORSTEN:  No mass or significant lymphadenopathy  CHEST WALL AND LOWER NECK: Normal  ABDOMEN LIVER/BILIARY TREE:  Unremarkable  GALLBLADDER:  No calcified gallstones  No pericholecystic inflammatory change  SPLEEN:  Unremarkable  Normal size  PANCREAS:  Unremarkable  ADRENAL GLANDS:  Unremarkable  KIDNEYS/URETERS: Bilateral renal subcentimeter nonobstructing calculi  PELVIS REPRODUCTIVE ORGANS:  Unremarkable for patient's age  URINARY BLADDER:  Unremarkable  ADDITIONAL ABDOMINAL AND PELVIC STRUCTURES: STOMACH AND BOWEL:  Unremarkable  ABDOMINOPELVIC CAVITY:  No pathologically enlarged mesenteric or retroperitoneal lymph nodes  No ascites or free intraperitoneal air  ABDOMINAL WALL/INGUINAL REGIONS:  Unremarkable  OSSEOUS STRUCTURES:  No acute fracture or destructive osseous lesion  Impression: Left basilar subpleural 2 2 cm lung opacity could relate pulmonary infarct from small nonvisualized subsegmental pulmonary embolus, rounded atelectasis, or less likely infection if clinically suspected  Posttreatment follow-up chest CT recommended in 2-3  months  Ectatic/aneurysmal 4 3 x 3 9 cm ascending thoracic aorta   No evidence of dissection  No evidence of dissection  Follow-up CTA chest in 6 months  Fractured/fragmented IVC filter which is unchanged since 2013  Bilateral nonobstructing subcentimeter renal calculi  Workstation performed: [id number]       Review of Systems:  Review of Systems   Cardiovascular: Positive for chest pain  Physical Exam:  /88 (BP Location: Left arm, Patient Position: Sitting, Cuff Size: Large)   Pulse 64   Ht 5' 7" (1 702 m)   Wt 97 1 kg (214 lb)   BMI 33 52 kg/m²   Physical Exam   Constitutional: He is oriented to person, place, and time  He appears well-developed and well-nourished  HENT:   Head: Normocephalic and atraumatic  Eyes: Pupils are equal, round, and reactive to light  Conjunctivae are normal    Neck: Normal range of motion  Neck supple  Cardiovascular: Normal rate and normal heart sounds  Pulmonary/Chest: Effort normal and breath sounds normal    Neurological: He is alert and oriented to person, place, and time  Skin: Skin is warm and dry  Psychiatric: He has a normal mood and affect  Vitals reviewed  Discussion/Summary: At this point the patient does need cardiac testing  It is been more than 10 years since he had a nuclear stress test   I will schedule an exercise nuclear stress test to exclude severe obstructive coronary disease  I will schedule an echocardiogram to compared to the prior study in reference to his ascending aortic aneurysm and LV wall thickness and systolic function  I have asked him to call if there is a problem in the interim otherwise I will see him in six months time in sooner as is necessary

## 2018-12-10 NOTE — PATIENT INSTRUCTIONS
I will schedule a stress test and echocardiogram   Please call if there is any problem in the interim  I will see you at the follow-up visit

## 2019-01-04 ENCOUNTER — HOSPITAL ENCOUNTER (OUTPATIENT)
Dept: NON INVASIVE DIAGNOSTICS | Facility: CLINIC | Age: 64
Discharge: HOME | End: 2019-01-04
Payer: COMMERCIAL

## 2019-01-04 DIAGNOSIS — I77.810 MILD ASCENDING AORTA DILATATION (HCC): ICD-10-CM

## 2019-01-04 DIAGNOSIS — E78.00 PURE HYPERCHOLESTEROLEMIA: ICD-10-CM

## 2019-01-04 DIAGNOSIS — R07.9 CHEST PAIN, UNSPECIFIED TYPE: ICD-10-CM

## 2019-01-04 LAB
CHEST PAIN STATEMENT: NORMAL
MAX DIASTOLIC BP: 61 MMHG
MAX HEART RATE: 134 BPM
MAX PREDICTED HEART RATE: 157 BPM
MAX. SYSTOLIC BP: 196 MMHG
PROTOCOL NAME: NORMAL
REASON FOR TERMINATION: NORMAL
TARGET HR FORMULA: NORMAL
TEST INDICATION: NORMAL
TIME IN EXERCISE PHASE: NORMAL

## 2019-01-04 PROCEDURE — 93016 CV STRESS TEST SUPVJ ONLY: CPT | Performed by: INTERNAL MEDICINE

## 2019-01-04 PROCEDURE — 93306 TTE W/DOPPLER COMPLETE: CPT

## 2019-01-04 PROCEDURE — 93017 CV STRESS TEST TRACING ONLY: CPT

## 2019-01-04 PROCEDURE — 78452 HT MUSCLE IMAGE SPECT MULT: CPT | Performed by: INTERNAL MEDICINE

## 2019-01-04 PROCEDURE — 78452 HT MUSCLE IMAGE SPECT MULT: CPT

## 2019-01-04 PROCEDURE — A9502 TC99M TETROFOSMIN: HCPCS

## 2019-01-04 PROCEDURE — 93306 TTE W/DOPPLER COMPLETE: CPT | Performed by: INTERNAL MEDICINE

## 2019-01-04 PROCEDURE — 93018 CV STRESS TEST I&R ONLY: CPT | Performed by: INTERNAL MEDICINE

## 2019-02-15 DIAGNOSIS — E78.00 PURE HYPERCHOLESTEROLEMIA: ICD-10-CM

## 2019-02-15 RX ORDER — EZETIMIBE 10 MG/1
10 TABLET ORAL DAILY
Qty: 90 TABLET | Refills: 1 | Status: SHIPPED | OUTPATIENT
Start: 2019-02-15 | End: 2019-08-22 | Stop reason: SDUPTHER

## 2019-03-19 ENCOUNTER — APPOINTMENT (OUTPATIENT)
Dept: RADIOLOGY | Facility: CLINIC | Age: 64
End: 2019-03-19
Payer: COMMERCIAL

## 2019-03-19 ENCOUNTER — OFFICE VISIT (OUTPATIENT)
Dept: OBGYN CLINIC | Facility: CLINIC | Age: 64
End: 2019-03-19
Payer: COMMERCIAL

## 2019-03-19 VITALS
SYSTOLIC BLOOD PRESSURE: 122 MMHG | WEIGHT: 215 LBS | HEIGHT: 67 IN | BODY MASS INDEX: 33.74 KG/M2 | HEART RATE: 82 BPM | DIASTOLIC BLOOD PRESSURE: 80 MMHG

## 2019-03-19 DIAGNOSIS — M70.52 PES ANSERINUS BURSITIS OF LEFT KNEE: Primary | ICD-10-CM

## 2019-03-19 DIAGNOSIS — M25.562 LEFT KNEE PAIN, UNSPECIFIED CHRONICITY: ICD-10-CM

## 2019-03-19 PROCEDURE — 20610 DRAIN/INJ JOINT/BURSA W/O US: CPT | Performed by: ORTHOPAEDIC SURGERY

## 2019-03-19 PROCEDURE — 73564 X-RAY EXAM KNEE 4 OR MORE: CPT

## 2019-03-19 PROCEDURE — 99203 OFFICE O/P NEW LOW 30 MIN: CPT | Performed by: ORTHOPAEDIC SURGERY

## 2019-03-19 RX ORDER — LIDOCAINE HYDROCHLORIDE 10 MG/ML
1 INJECTION, SOLUTION EPIDURAL; INFILTRATION; INTRACAUDAL; PERINEURAL
Status: COMPLETED | OUTPATIENT
Start: 2019-03-19 | End: 2019-03-19

## 2019-03-19 RX ORDER — BETAMETHASONE SODIUM PHOSPHATE AND BETAMETHASONE ACETATE 3; 3 MG/ML; MG/ML
12 INJECTION, SUSPENSION INTRA-ARTICULAR; INTRALESIONAL; INTRAMUSCULAR; SOFT TISSUE
Status: COMPLETED | OUTPATIENT
Start: 2019-03-19 | End: 2019-03-19

## 2019-03-19 RX ADMIN — LIDOCAINE HYDROCHLORIDE 1 ML: 10 INJECTION, SOLUTION EPIDURAL; INFILTRATION; INTRACAUDAL; PERINEURAL at 12:06

## 2019-03-19 RX ADMIN — BETAMETHASONE SODIUM PHOSPHATE AND BETAMETHASONE ACETATE 12 MG: 3; 3 INJECTION, SUSPENSION INTRA-ARTICULAR; INTRALESIONAL; INTRAMUSCULAR; SOFT TISSUE at 12:06

## 2019-03-19 NOTE — PROGRESS NOTES
Assessment:     1  Pes anserinus bursitis of left knee    2  Left knee pain, unspecified chronicity        Plan:     Problem List Items Addressed This Visit        Musculoskeletal and Integument    Pes anserinus bursitis of left knee - Primary     Findings consistent with left knee pes bursitis  Discussed findings and treatment options with the patient  I reviewed patient's left knee x-ray with him  I discussed prognosis of his left knee condition  I recommend patient to do home stretching exercises as instructed  I provided patient cortisone injection over the left knee pes bursa, which patient tolerated well  I recommended patient to take over-the-counter NSAID for the pain  Patient should try to avoid repetitive squatting, climbing, and high impact activities  Cold compress over the injection site today  Re-evaluate in 6-8 weeks or as needed  All patient's questions were answered to his satisfaction  This note is created using dictation transcription  It may contain typographical errors, grammatical errors, improperly dictated words, background noise and other errors  Relevant Medications    lidocaine (PF) (XYLOCAINE-MPF) 1 % injection 1 mL (Completed)    betamethasone acetate-betamethasone sodium phosphate (CELESTONE) injection 12 mg (Completed)    Other Relevant Orders    Large joint arthrocentesis: L anserine bursa (Completed)      Other Visit Diagnoses     Left knee pain, unspecified chronicity        Relevant Orders    XR knee 4+ vw left injury         Subjective:     Patient ID: Rigo Araujo  is a 61 y o  male  Chief Complaint:  70-year-old gentleman with gradual increased pain in the left knee status about 4 weeks ago  Patient denies any injury  His pain is localized over the inner aspect of his knee  Pain increases with walking activities  He denies pain with squatting or climbing stairs  He denies locking or giving way sensations    Pain is mostly aching with occasional shooting pain  Information on patient's intake form was reviewed  Allergy:  Allergies   Allergen Reactions    Moxifloxacin Shortness Of Breath and Hives    Alcohol      vertigo    Caffeine      vertigo    Cephalosporins      hyperventilation    Doxycycline     Penicillins Hives    Pravastatin      Other reaction(s): Respiratory Distress    Tamiflu [Oseltamivir] Lightheadedness     Hyperventilation     Medications:  all current active meds have been reviewed  Past Medical History:  Past Medical History:   Diagnosis Date    GERD (gastroesophageal reflux disease)     Hyperlipidemia     Kidney stone     Meniere disease     Pulmonary embolism (HCC)      Past Surgical History:  Past Surgical History:   Procedure Laterality Date    ABDOMINAL SURGERY      BACK SURGERY      COCHLEAR IMPLANT Right     HERNIA REPAIR      IVC FILTER INSERTION       Family History:  Family History   Problem Relation Age of Onset    Hypertension Mother     Hyperlipidemia Mother     Hypertension Father     Hyperlipidemia Father      Social History:  Social History     Substance and Sexual Activity   Alcohol Use No     Social History     Substance and Sexual Activity   Drug Use No     Social History     Tobacco Use   Smoking Status Never Smoker   Smokeless Tobacco Never Used     Review of Systems   Constitutional: Negative  HENT: Negative  Eyes: Negative  Respiratory: Negative  Cardiovascular: Negative  Gastrointestinal: Negative  Endocrine: Negative  Genitourinary: Negative  Musculoskeletal: Positive for arthralgias (Left knee)  Negative for gait problem and joint swelling  Skin: Negative  Neurological: Negative  Hematological: Negative  Psychiatric/Behavioral: Negative            Objective:  BP Readings from Last 1 Encounters:   03/19/19 122/80      Wt Readings from Last 1 Encounters:   03/19/19 97 5 kg (215 lb)      BMI:   Estimated body mass index is 33 67 kg/m² as calculated from the following:    Height as of this encounter: 5' 7" (1 702 m)  Weight as of this encounter: 97 5 kg (215 lb)  BSA:   Estimated body surface area is 2 08 meters squared as calculated from the following:    Height as of this encounter: 5' 7" (1 702 m)  Weight as of this encounter: 97 5 kg (215 lb)  Physical Exam   Constitutional: He is oriented to person, place, and time  He appears well-developed  HENT:   Head: Normocephalic and atraumatic  Eyes: Conjunctivae and EOM are normal    Neck: Neck supple  Musculoskeletal:        Left knee: He exhibits no effusion  Neurological: He is alert and oriented to person, place, and time  Skin: Skin is warm  Psychiatric: He has a normal mood and affect  Nursing note and vitals reviewed  Left Knee Exam     Muscle Strength   The patient has normal left knee strength  Tenderness   The patient is experiencing tenderness in the pes anserinus  Range of Motion   The patient has normal left knee ROM  Tests   Caitlin:  Medial - negative Lateral - negative  Varus: negative Valgus: negative  Patellar apprehension: negative    Other   Erythema: absent  Sensation: normal  Pulse: present  Swelling: none  Effusion: no effusion present            I have personally reviewed pertinent films in PACS and my interpretation is Left knee show good joint alignment  Mild degenerative changes along the medial and patellofemoral joint  No soft tissue calcification       Large joint arthrocentesis: L anserine bursa  Date/Time: 3/19/2019 12:06 PM  Consent given by: patient  Site marked: site marked  Timeout: Immediately prior to procedure a time out was called to verify the correct patient, procedure, equipment, support staff and site/side marked as required   Supporting Documentation  Indications: pain   Procedure Details  Location: knee - L anserine bursa  Preparation: Patient was prepped and draped in the usual sterile fashion  Needle size: 25 G  Ultrasound guidance: no  Approach: anteromedial  Medications administered: 1 mL lidocaine (PF) 1 %; 12 mg betamethasone acetate-betamethasone sodium phosphate 6 (3-3) mg/mL    Patient tolerance: patient tolerated the procedure well with no immediate complications  Dressing:  Sterile dressing applied

## 2019-03-19 NOTE — ASSESSMENT & PLAN NOTE
Findings consistent with left knee pes bursitis  Discussed findings and treatment options with the patient  I reviewed patient's left knee x-ray with him  I discussed prognosis of his left knee condition  I recommend patient to do home stretching exercises as instructed  I provided patient cortisone injection over the left knee pes bursa, which patient tolerated well  I recommended patient to take over-the-counter NSAID for the pain  Patient should try to avoid repetitive squatting, climbing, and high impact activities  Cold compress over the injection site today  Re-evaluate in 6-8 weeks or as needed  All patient's questions were answered to his satisfaction  This note is created using dictation transcription  It may contain typographical errors, grammatical errors, improperly dictated words, background noise and other errors

## 2019-05-31 ENCOUNTER — OFFICE VISIT (OUTPATIENT)
Dept: OBGYN CLINIC | Facility: CLINIC | Age: 64
End: 2019-05-31
Payer: COMMERCIAL

## 2019-05-31 VITALS
HEIGHT: 67 IN | SYSTOLIC BLOOD PRESSURE: 130 MMHG | HEART RATE: 84 BPM | BODY MASS INDEX: 34.37 KG/M2 | WEIGHT: 219 LBS | DIASTOLIC BLOOD PRESSURE: 82 MMHG

## 2019-05-31 DIAGNOSIS — M70.52 BURSITIS OF OTHER BURSA OF LEFT KNEE: Primary | ICD-10-CM

## 2019-05-31 PROCEDURE — 99213 OFFICE O/P EST LOW 20 MIN: CPT | Performed by: ORTHOPAEDIC SURGERY

## 2019-07-15 ENCOUNTER — HOSPITAL ENCOUNTER (OUTPATIENT)
Dept: NON INVASIVE DIAGNOSTICS | Facility: CLINIC | Age: 64
Discharge: HOME | End: 2019-07-15
Payer: COMMERCIAL

## 2019-07-15 DIAGNOSIS — E78.00 PURE HYPERCHOLESTEROLEMIA: ICD-10-CM

## 2019-07-15 DIAGNOSIS — I77.810 MILD ASCENDING AORTA DILATATION (HCC): ICD-10-CM

## 2019-07-15 DIAGNOSIS — R07.9 CHEST PAIN, UNSPECIFIED TYPE: ICD-10-CM

## 2019-07-15 PROCEDURE — 93306 TTE W/DOPPLER COMPLETE: CPT | Performed by: INTERNAL MEDICINE

## 2019-07-15 PROCEDURE — 93306 TTE W/DOPPLER COMPLETE: CPT

## 2019-08-22 DIAGNOSIS — E78.00 PURE HYPERCHOLESTEROLEMIA: ICD-10-CM

## 2019-08-22 RX ORDER — EZETIMIBE 10 MG/1
TABLET ORAL
Qty: 90 TABLET | Refills: 1 | Status: SHIPPED | OUTPATIENT
Start: 2019-08-22 | End: 2020-02-24 | Stop reason: SDUPTHER

## 2019-08-30 NOTE — PROGRESS NOTES
Cardiology Follow Up    Clementina Menchaca   1955  [id number]  [facility] [place]  [address] [employer]  [address]  [phone]  [phone]    1  Pure hypercholesterolemia     2  Mild ascending aorta dilatation (HCC)         Interval History:  Patient is here for a follow-up visit  He was last seen by me in December  He has a mild descending thoracic aortic aneurysm most recently seen on CT scan performed December 2018  It was 4 3 x 3 9 cm  His most recent echocardiogram done July 15th demonstrated preserved LV systolic function with mild dilatation of the ascending aorta at 4 0 cm  He has a history of IVC filter placement in reference to prior veno-thromboembolism  He had a exercise nuclear stress test done January 2019 which demonstrated no evidence of provokable ischemia  His ejection fraction was 62%  Patient admits to statin intolerance in reference to muscle and memory issues  He did not even tolerate pravastatin 10 mg per day  He does tolerate Zetia  Vital signs are stable today  He has had no chest pain or significant dyspnea  He has had some issues with hand numbness in the setting of having carpal tunnel syndrome  He is well occasionally gets some left biceps discomfort  I do not think this is heart related  He does have a lipoma on his left forearm which she has had for years      Patient Active Problem List   Diagnosis    Pes anserinus bursitis of left knee    Bursitis of left knee     Past Medical History:   Diagnosis Date    GERD (gastroesophageal reflux disease)     Hyperlipidemia     Kidney stone     Meniere disease     Pulmonary embolism (HCC)      Social History     Socioeconomic History    Marital status:      Spouse name: Not on file    Number of children: Not on file    Years of education: Not on file    Highest education level: Not on file   Occupational History    Not on file   Social Needs    Financial resource strain: Not on file    Food insecurity:     Worry: Not on file     Inability: Not on file    Transportation needs:     Medical: Not on file     Non-medical: Not on file   Tobacco Use    Smoking status: Never Smoker    Smokeless tobacco: Never Used   Substance and Sexual Activity    Alcohol use: No    Drug use: No    Sexual activity: Not on file   Lifestyle    Physical activity:     Days per week: Not on file     Minutes per session: Not on file    Stress: Not on file   Relationships    Social connections:     Talks on phone: Not on file     Gets together: Not on file     Attends [employer] service: Not on file     Active member of club or organization: Not on file     Attends meetings of clubs or organizations: Not on file     Relationship status: Not on file    Intimate partner violence:     Fear of current or ex partner: Not on file     Emotionally abused: Not on file     Physically abused: Not on file     Forced sexual activity: Not on file   Other Topics Concern    Not on file   Social History Narrative    Not on file      Family History   Problem Relation Age of Onset    Hypertension Mother     Hyperlipidemia Mother     Hypertension Father     Hyperlipidemia Father      Past Surgical History:   Procedure Laterality Date    ABDOMINAL SURGERY      BACK SURGERY      COCHLEAR IMPLANT Right     HERNIA REPAIR      IVC FILTER INSERTION         Current Outpatient Medications:     chlorthalidone 25 mg tablet, Take 25 mg by mouth daily, Disp: , Rfl:     ezetimibe (ZETIA) 10 mg tablet, TAKE 1 TABLET BY MOUTH EVERY DAY, Disp: 90 tablet, Rfl: 1    Nutritional Supplements (THERALITH XR) TABS, Take by mouth 2 (two) times a day 2 tablets BID, Disp: , Rfl:     omeprazole (PriLOSEC) 20 mg delayed release capsule, Take 20 mg by mouth daily, Disp: , Rfl:     ranitidine (ZANTAC) 150 mg tablet, Take 150 mg by mouth 2 (two) times a day, Disp: , Rfl:   Allergies Allergen Reactions    Moxifloxacin Shortness Of Breath and Hives    Alcohol      vertigo    Caffeine      vertigo    Cephalosporins      hyperventilation    Doxycycline     Penicillins Hives    Pravastatin      Other reaction(s): Respiratory Distress    Prednisone Tinnitus     Other reaction(s): Unknown    Tamiflu [Oseltamivir] Lightheadedness     Hyperventilation       Labs:not applicable  Imaging: No results found  Review of Systems:  Review of Systems   All other systems reviewed and are negative  Physical Exam:  /82 (BP Location: Left arm, Patient Position: Sitting, Cuff Size: Standard)   Pulse 72   Ht 5' 7" (1 702 m)   Wt 98 9 kg (218 lb)   BMI 34 14 kg/m²   Physical Exam   Constitutional: He is oriented to person, place, and time  He appears well-developed and well-nourished  HENT:   Head: Normocephalic and atraumatic  Eyes: Pupils are equal, round, and reactive to light  Conjunctivae are normal    Neck: Normal range of motion  Neck supple  Cardiovascular: Normal rate and normal heart sounds  Pulmonary/Chest: Effort normal and breath sounds normal    Neurological: He is alert and oriented to person, place, and time  Skin: Skin is warm and dry  Psychiatric: He has a normal mood and affect  Vitals reviewed  Discussion/Summary:I will continue the patient's present medical regimen  The patient appears well compensated  I have asked the patient to call if there is a problem in the interim otherwise I will see the patient in six months time

## 2019-09-04 ENCOUNTER — OFFICE VISIT (OUTPATIENT)
Dept: CARDIOLOGY CLINIC | Facility: CLINIC | Age: 64
End: 2019-09-04
Payer: COMMERCIAL

## 2019-09-04 VITALS
BODY MASS INDEX: 34.21 KG/M2 | HEART RATE: 72 BPM | SYSTOLIC BLOOD PRESSURE: 138 MMHG | WEIGHT: 218 LBS | HEIGHT: 67 IN | DIASTOLIC BLOOD PRESSURE: 82 MMHG

## 2019-09-04 DIAGNOSIS — I77.810 MILD ASCENDING AORTA DILATATION (HCC): ICD-10-CM

## 2019-09-04 DIAGNOSIS — E78.00 PURE HYPERCHOLESTEROLEMIA: Primary | ICD-10-CM

## 2019-09-04 PROCEDURE — 99214 OFFICE O/P EST MOD 30 MIN: CPT | Performed by: INTERNAL MEDICINE

## 2020-02-24 DIAGNOSIS — E78.00 PURE HYPERCHOLESTEROLEMIA: ICD-10-CM

## 2020-02-24 RX ORDER — EZETIMIBE 10 MG/1
10 TABLET ORAL DAILY
Qty: 90 TABLET | Refills: 2 | Status: SHIPPED | OUTPATIENT
Start: 2020-02-24 | End: 2020-07-08 | Stop reason: ALTCHOICE

## 2020-02-24 NOTE — TELEPHONE ENCOUNTER
Received fax from [facility] requesting refill on Ezetimibe 10 mg daily 90 day supply

## 2020-06-16 ENCOUNTER — TELEPHONE (OUTPATIENT)
Dept: CARDIOLOGY CLINIC | Facility: CLINIC | Age: 65
End: 2020-06-16

## 2020-06-23 DIAGNOSIS — E78.00 PURE HYPERCHOLESTEROLEMIA: Primary | ICD-10-CM

## 2020-07-01 LAB
CHOLEST SERPL-MCNC: 208 MG/DL (ref 100–199)
HDLC SERPL-MCNC: 45 MG/DL
LDLC SERPL CALC-MCNC: 140 MG/DL (ref 0–99)
TRIGL SERPL-MCNC: 116 MG/DL (ref 0–149)

## 2020-07-03 NOTE — PROGRESS NOTES
Cardiology Follow Up    Kathy Bates   1955  [id number]  [facility]  [address]  [address]  [phone]  [phone]    1  Pure hypercholesterolemia  POCT ECG    Lipid panel    Hepatic function panel    Echo complete with contrast if indicated   2  Mild ascending aorta dilatation (HCC)  POCT ECG    Lipid panel    Hepatic function panel    Echo complete with contrast if indicated   3  Chest pain, unspecified type  Lipid panel    Hepatic function panel    Echo complete with contrast if indicated       Interval History: Patient is here for a follow-up visit  He has a mild descending thoracic aortic aneurysm most recently seen on CT scan performed December 2018  It was 4 3 x 3 9 cm  His most recent echocardiogram done July 2019 demonstrated preserved LV systolic function with mild dilatation of the ascending aorta at 4 0 cm  He has a history of IVC filter placement in reference to prior veno-thromboembolism  He had a exercise nuclear stress test done January 2019 which demonstrated no evidence of provokable ischemia  His ejection fraction was 62%  Patient admits to statin intolerance in reference to muscle and memory issues  He did not even tolerate pravastatin 10 mg per day  He was on Zetia but discontinued this  Recent lipid profile done June 30, 2020 demonstrated total cholesterol of 208 with an HDL of 45 and a calculated LDL of 140  Patient has had no chest pain or significant dyspnea  His vital signs are stable today  EKG demonstrates sinus rhythm and is a normal tracing  He is willing to try Zetia again as he has been on this since May 2017 without difficulty  He is also seeing his gastroenterologist as he is concerned that his esophageal ring needs to be dilated  Food has not been going down as normal   LDL cholesterol previously on Zetia was 110   From a heart point of view he has had no chest pain or significant dyspnea      Patient Active Problem List   Diagnosis    Pes anserinus bursitis of left knee    Bursitis of left knee     Past Medical History:   Diagnosis Date    GERD (gastroesophageal reflux disease)     Hyperlipidemia     Kidney stone     Meniere disease     Pulmonary embolism (HCC)      Social History     Socioeconomic History    Marital status:      Spouse name: Not on file    Number of children: Not on file    Years of education: Not on file    Highest education level: Not on file   Occupational History    Not on file   Social Needs    Financial resource strain: Not on file    Food insecurity:     Worry: Not on file     Inability: Not on file    Transportation needs:     Medical: Not on file     Non-medical: Not on file   Tobacco Use    Smoking status: Never Smoker    Smokeless tobacco: Never Used   Substance and Sexual Activity    Alcohol use: No    Drug use: No    Sexual activity: Not on file   Lifestyle    Physical activity:     Days per week: Not on file     Minutes per session: Not on file    Stress: Not on file   Relationships    Social connections:     Talks on phone: Not on file     Gets together: Not on file     Attends [employer] service: Not on file     Active member of club or organization: Not on file     Attends meetings of clubs or organizations: Not on file     Relationship status: Not on file    Intimate partner violence:     Fear of current or ex partner: Not on file     Emotionally abused: Not on file     Physically abused: Not on file     Forced sexual activity: Not on file   Other Topics Concern    Not on file   Social History Narrative    Not on file      Family History   Problem Relation Age of Onset    Hypertension Mother     Hyperlipidemia Mother     Hypertension Father     Hyperlipidemia Father      Past Surgical History:   Procedure Laterality Date    ABDOMINAL SURGERY      BACK SURGERY      COCHLEAR IMPLANT Right     HERNIA REPAIR      IVC FILTER INSERTION         Current Outpatient Medications:     chlorthalidone 25 mg tablet, Take 25 mg by mouth daily, Disp: , Rfl:     Nutritional Supplements (THERALITH XR) TABS, Take by mouth 2 (two) times a day 2 tablets BID, Disp: , Rfl:     omeprazole (PriLOSEC) 20 mg delayed release capsule, Take 20 mg by mouth daily, Disp: , Rfl:   Allergies   Allergen Reactions    Moxifloxacin Shortness Of Breath and Hives    Alcohol      vertigo    Caffeine      vertigo    Cephalosporins      hyperventilation    Doxycycline     Penicillins Hives    Pravastatin      Other reaction(s): Respiratory Distress    Prednisone Tinnitus     Other reaction(s): Unknown    Tamiflu [Oseltamivir] Lightheadedness     Hyperventilation       Labs:not applicable  Imaging: No results found  Review of Systems:  Review of Systems   All other systems reviewed and are negative  Physical Exam:  /80 (BP Location: Left arm, Patient Position: Sitting, Cuff Size: Standard)   Pulse 72   Ht 5' 7" (1 702 m)   Wt 99 8 kg (220 lb)   BMI 34 46 kg/m²   Physical Exam   Constitutional: He is oriented to person, place, and time  He appears well-developed and well-nourished  HENT:   Head: Normocephalic and atraumatic  Eyes: Pupils are equal, round, and reactive to light  Conjunctivae are normal    Neck: Normal range of motion  Neck supple  Cardiovascular: Normal rate and normal heart sounds  Pulmonary/Chest: Effort normal and breath sounds normal    Neurological: He is alert and oriented to person, place, and time  Skin: Skin is warm and dry  Psychiatric: He has a normal mood and affect  Vitals reviewed  Discussion/Summary:  Patient will continue his present medical regimen  He is willing to restart Zetia and I asked him to take it on Monday Wednesday and Friday and we will do follow-up blood work  If he is unable to tolerate it he does have an open mind to trying PCSK9 inhibitors    Will check a follow-up echo as well prior to his next visit

## 2020-07-08 ENCOUNTER — OFFICE VISIT (OUTPATIENT)
Dept: CARDIOLOGY CLINIC | Facility: CLINIC | Age: 65
End: 2020-07-08
Payer: COMMERCIAL

## 2020-07-08 VITALS
SYSTOLIC BLOOD PRESSURE: 130 MMHG | DIASTOLIC BLOOD PRESSURE: 80 MMHG | HEIGHT: 67 IN | BODY MASS INDEX: 34.53 KG/M2 | WEIGHT: 220 LBS | HEART RATE: 72 BPM

## 2020-07-08 DIAGNOSIS — E78.00 PURE HYPERCHOLESTEROLEMIA: Primary | ICD-10-CM

## 2020-07-08 DIAGNOSIS — I77.810 MILD ASCENDING AORTA DILATATION (HCC): ICD-10-CM

## 2020-07-08 DIAGNOSIS — R07.9 CHEST PAIN, UNSPECIFIED TYPE: ICD-10-CM

## 2020-07-08 PROCEDURE — 99214 OFFICE O/P EST MOD 30 MIN: CPT | Performed by: INTERNAL MEDICINE

## 2020-07-08 PROCEDURE — 93000 ELECTROCARDIOGRAM COMPLETE: CPT | Performed by: INTERNAL MEDICINE

## 2020-07-08 NOTE — PATIENT INSTRUCTIONS
Please restart Zetia and take it Monday Wednesday Friday  Will do follow-up blood work  Will check an echocardiogram prior to her next visit    Please call if there is a problem in the interim period

## 2020-10-30 ENCOUNTER — HOSPITAL ENCOUNTER (OUTPATIENT)
Dept: NON INVASIVE DIAGNOSTICS | Age: 65
Discharge: HOME | End: 2020-10-30
Payer: COMMERCIAL

## 2020-10-30 DIAGNOSIS — E78.00 PURE HYPERCHOLESTEROLEMIA: ICD-10-CM

## 2020-10-30 DIAGNOSIS — I77.810 MILD ASCENDING AORTA DILATATION (HCC): ICD-10-CM

## 2020-10-30 DIAGNOSIS — R07.9 CHEST PAIN, UNSPECIFIED TYPE: ICD-10-CM

## 2020-10-30 PROCEDURE — 93306 TTE W/DOPPLER COMPLETE: CPT | Performed by: INTERNAL MEDICINE

## 2020-10-30 PROCEDURE — 93306 TTE W/DOPPLER COMPLETE: CPT

## 2020-10-31 LAB
ALBUMIN SERPL-MCNC: 4.2 G/DL (ref 3.8–4.8)
ALP SERPL-CCNC: 97 IU/L (ref 39–117)
ALT SERPL-CCNC: 34 IU/L (ref 0–44)
AST SERPL-CCNC: 27 IU/L (ref 0–40)
BILIRUB DIRECT SERPL-MCNC: 0.16 MG/DL (ref 0–0.4)
BILIRUB SERPL-MCNC: 0.7 MG/DL (ref 0–1.2)
CHOLEST SERPL-MCNC: 166 MG/DL (ref 100–199)
HDLC SERPL-MCNC: 45 MG/DL
LDLC SERPL CALC-MCNC: 96 MG/DL (ref 0–99)
PROT SERPL-MCNC: 7.3 G/DL (ref 6–8.5)
SL AMB VLDL CHOLESTEROL CALC: 25 MG/DL (ref 5–40)
TRIGL SERPL-MCNC: 142 MG/DL (ref 0–149)

## 2020-12-03 ENCOUNTER — OFFICE VISIT (OUTPATIENT)
Dept: OBGYN CLINIC | Facility: CLINIC | Age: 65
End: 2020-12-03
Payer: COMMERCIAL

## 2020-12-03 ENCOUNTER — APPOINTMENT (OUTPATIENT)
Dept: RADIOLOGY | Facility: CLINIC | Age: 65
End: 2020-12-03
Payer: COMMERCIAL

## 2020-12-03 VITALS
DIASTOLIC BLOOD PRESSURE: 80 MMHG | BODY MASS INDEX: 34.69 KG/M2 | WEIGHT: 221 LBS | TEMPERATURE: 98.1 F | SYSTOLIC BLOOD PRESSURE: 120 MMHG | HEIGHT: 67 IN

## 2020-12-03 DIAGNOSIS — M25.511 RIGHT SHOULDER PAIN, UNSPECIFIED CHRONICITY: ICD-10-CM

## 2020-12-03 DIAGNOSIS — M75.41 SHOULDER IMPINGEMENT SYNDROME, RIGHT: Primary | ICD-10-CM

## 2020-12-03 PROCEDURE — 99213 OFFICE O/P EST LOW 20 MIN: CPT | Performed by: ORTHOPAEDIC SURGERY

## 2020-12-03 PROCEDURE — 73030 X-RAY EXAM OF SHOULDER: CPT

## 2020-12-03 RX ORDER — AMOXICILLIN 500 MG
CAPSULE ORAL 3 TIMES WEEKLY
COMMUNITY
End: 2022-06-20 | Stop reason: ALTCHOICE

## 2020-12-03 RX ORDER — EZETIMIBE 10 MG/1
10 TABLET ORAL EVERY OTHER DAY
COMMUNITY

## 2021-02-05 ENCOUNTER — OFFICE VISIT (OUTPATIENT)
Dept: URGENT CARE | Facility: CLINIC | Age: 66
End: 2021-02-05
Payer: COMMERCIAL

## 2021-02-05 ENCOUNTER — APPOINTMENT (OUTPATIENT)
Dept: RADIOLOGY | Facility: CLINIC | Age: 66
End: 2021-02-05
Payer: COMMERCIAL

## 2021-02-05 VITALS
OXYGEN SATURATION: 96 % | WEIGHT: 223.4 LBS | RESPIRATION RATE: 16 BRPM | BODY MASS INDEX: 35.06 KG/M2 | TEMPERATURE: 97.7 F | HEIGHT: 67 IN | HEART RATE: 81 BPM | SYSTOLIC BLOOD PRESSURE: 130 MMHG | DIASTOLIC BLOOD PRESSURE: 74 MMHG

## 2021-02-05 DIAGNOSIS — M79.671 RIGHT FOOT PAIN: Primary | ICD-10-CM

## 2021-02-05 DIAGNOSIS — M79.671 RIGHT FOOT PAIN: ICD-10-CM

## 2021-02-05 PROCEDURE — 99213 OFFICE O/P EST LOW 20 MIN: CPT | Performed by: FAMILY MEDICINE

## 2021-02-05 PROCEDURE — 73630 X-RAY EXAM OF FOOT: CPT

## 2021-02-05 NOTE — PROGRESS NOTES
[facility]        NAME: Uli Rocha  is a 72 y o  male  : 1955    MRN: [id number]  DATE: 2021  TIME: 1:54 PM    Assessment and Plan   Right foot pain [M79 [id number]]  1  Right foot pain  XR foot 3+ vw right         Patient Instructions       Follow up with PCP in 3-5 days  Proceed to  ER if symptoms worsen  Chief Complaint     Chief Complaint   Patient presents with    Toe Pain     Patient stubbed right 5th toe this AM   Bruised/red         History of Present Illness       Six 11year-old male presenting today for evaluation of right toe pain  Patient reports stopping his right toe earlier today on a piece fracture  He has since noticed increased swelling and pain over the 5th toe the right foot  He reports no difficulty with walking and does reports some discomfort when he attempts to bend the toe  He denies any numbness or tingling  He denies any warmth or crepitus  Review of Systems   Review of Systems   Constitutional: Negative  HENT: Negative  Eyes: Negative  Respiratory: Negative  Cardiovascular: Negative  Gastrointestinal: Negative  Genitourinary: Negative  Musculoskeletal: Positive for arthralgias and myalgias  Skin: Negative  Allergic/Immunologic: Negative  Neurological: Negative  Hematological: Negative  Psychiatric/Behavioral: Negative            Current Medications       Current Outpatient Medications:     chlorthalidone 25 mg tablet, Take 25 mg by mouth daily, Disp: , Rfl:     ezetimibe (ZETIA) 10 mg tablet, Take 10 mg by mouth daily, Disp: , Rfl:     Nutritional Supplements (THERALITH XR) TABS, Take by mouth 2 (two) times a day 2 tablets BID, Disp: , Rfl:     Omega-3 Fatty Acids (Fish Oil) 1200 MG CAPS, Take by mouth 3 (three) times a week, Disp: , Rfl:     omeprazole (PriLOSEC) 20 mg delayed release capsule, Take 20 mg by mouth daily, Disp: , Rfl:     Current Allergies     Allergies as of 2021 - Reviewed 02/05/2021   Allergen Reaction Noted    Moxifloxacin Shortness Of Breath and Hives 10/16/2013    Alcohol  02/08/2017    Caffeine  05/27/2016    Cephalosporins  05/27/2016    Doxycycline      Penicillins Hives 07/30/2015    Pravastatin      Prednisone Tinnitus 11/17/2017    Tamiflu [oseltamivir] Lightheadedness 04/04/2018            The following portions of the patient's history were reviewed and updated as appropriate: allergies, current medications, past family history, past medical history, past social history, past surgical history and problem list      Past Medical History:   Diagnosis Date    GERD (gastroesophageal reflux disease)     Hyperlipidemia     Kidney stone     Meniere disease     Pulmonary embolism ([address] )        Past Surgical History:   Procedure Laterality Date    ABDOMINAL SURGERY      BACK SURGERY      COCHLEAR IMPLANT Right     HERNIA REPAIR      IVC FILTER INSERTION         Family History   Problem Relation Age of Onset    Hypertension Mother     Hyperlipidemia Mother     Hypertension Father     Hyperlipidemia Father          Medications have been verified  Objective   /74   Pulse 81   Temp 97 7 °F (36 5 °C) (Temporal)   Resp 16   Ht 5' 7" (1 702 m)   Wt 101 kg (223 lb 6 4 oz)   SpO2 96%   BMI 34 99 kg/m²   No LMP for male patient  Physical Exam     Physical Exam  Constitutional:       Appearance: He is well-developed  HENT:      Head: Normocephalic  Eyes:      Pupils: Pupils are equal, round, and reactive to light  Neck:      Musculoskeletal: Normal range of motion  Pulmonary:      Effort: Pulmonary effort is normal    Musculoskeletal:      Right foot: Decreased range of motion  Tenderness and swelling present  Feet:    Skin:     General: Skin is warm  Neurological:      Mental Status: He is alert and oriented to person, place, and time

## 2021-04-09 DIAGNOSIS — I10 HYPERTENSION, UNSPECIFIED TYPE: Primary | ICD-10-CM

## 2021-04-09 RX ORDER — CHLORTHALIDONE 25 MG/1
25 TABLET ORAL DAILY
Qty: 90 TABLET | Refills: 1 | Status: SHIPPED | OUTPATIENT
Start: 2021-04-09 | End: 2021-10-08

## 2021-04-09 NOTE — TELEPHONE ENCOUNTER
[facility] Emergency Department    [facility]  [address] [address]    Phone:  [phone]    Fax:  [address]   MRN: [id number]    Department:  [facility] Emergency Department   Date of Visit: Pt stopped in with a prescription from another physician (ENT) for Chlorthalidone 25 mg one tablet daily for an inner ear issue but he states it's been helping his BP as well  Pt would like to know if you would continue to prescribe this for him? He does have a f/u appt scheduled w/ you 5/12/21  IF THERE IS ANY CHANGE OR WORSENING OF YOUR CONDITION, CALL YOUR PRIMARY CARE PHYSICIAN AT ONCE OR RETURN IMMEDIATELY TO THE EMERGENCY DEPARTMENT.     If you have been prescribed any medication(s), please fill your prescription right away and begin taking t

## 2021-04-17 ENCOUNTER — IMMUNIZATIONS (OUTPATIENT)
Dept: FAMILY MEDICINE CLINIC | Facility: HOSPITAL | Age: 66
End: 2021-04-17

## 2021-04-17 DIAGNOSIS — Z23 ENCOUNTER FOR IMMUNIZATION: Primary | ICD-10-CM

## 2021-04-17 PROCEDURE — 91300 SARS-COV-2 / COVID-19 MRNA VACCINE (PFIZER-BIONTECH) 30 MCG: CPT

## 2021-04-17 PROCEDURE — 0001A SARS-COV-2 / COVID-19 MRNA VACCINE (PFIZER-BIONTECH) 30 MCG: CPT

## 2021-05-05 NOTE — PROGRESS NOTES
Cardiology Follow Up    [facility]io [facility]   1955  [id number]  [facility]  [address]  [address]  [phone]    1  Hypertension, unspecified type  POCT ECG    Echo complete with contrast if indicated    Basic metabolic panel   2  Pure hypercholesterolemia  POCT ECG    Echo complete with contrast if indicated    Basic metabolic panel   3  Mild ascending aorta dilatation (HCC)  POCT ECG    Echo complete with contrast if indicated    Basic metabolic panel       Interval History: Patient is here for a follow-up visit  Chanda Vazquez has a mild descending thoracic aortic aneurysm most recently seen on CT scan performed December 2018   It was 4 3 x 3 9 cm   His most recent echocardiogram done July 2019 demonstrated preserved LV systolic function with mild dilatation of the ascending aorta at 4 0 cm  [facility] has a history of IVC filter placement in reference to prior veno-thromboembolism  [facility] had an ETT done January 2019 which demonstrated no evidence of provokable ischemia   His ejection fraction was 62%   Patient admits to statin intolerance in reference to muscle and memory issues   He did not even tolerate pravastatin 10 mg per day  A lipid profile done October 2020 demonstrated total cholesterol of 166 with an HDL of 45 and a calculated LDL of 96  This was improved compared to a prior study  Patient is on Zetia  Patient has had no chest pain or significant dyspnea  His EKG today demonstrates sinus rhythm and is a normal tracing  Vital signs are stable  Patient does get occasional palpitation and tells me he is prone to hypokalemia because of his diuretic  Prior blood work does reflect this  Will check a current BMP and provide potassium supplement as necessary      Patient Active Problem List   Diagnosis    Pes anserinus bursitis of left knee    Bursitis of left knee    Shoulder impingement syndrome, right Past Medical History:   Diagnosis Date    GERD (gastroesophageal reflux disease)     Hyperlipidemia     Kidney stone     Meniere disease     Pulmonary embolism (HCC)      Social History     Socioeconomic History    Marital status:      Spouse name: Not on file    Number of children: Not on file    Years of education: Not on file    Highest education level: Not on file   Occupational History    Not on file   Social Needs    Financial resource strain: Not on file    Food insecurity     Worry: Not on file     Inability: Not on file    Transportation needs     Medical: Not on file     Non-medical: Not on file   Tobacco Use    Smoking status: Never Smoker    Smokeless tobacco: Never Used   Substance and Sexual Activity    Alcohol use: No    Drug use: No    Sexual activity: Not on file   Lifestyle    Physical activity     Days per week: Not on file     Minutes per session: Not on file    Stress: Not on file   Relationships    Social connections     Talks on phone: Not on file     Gets together: Not on file     Attends [employer] service: Not on file     Active member of club or organization: Not on file     Attends meetings of clubs or organizations: Not on file     Relationship status: Not on file    Intimate partner violence     Fear of current or ex partner: Not on file     Emotionally abused: Not on file     Physically abused: Not on file     Forced sexual activity: Not on file   Other Topics Concern    Not on file   Social History Narrative    Not on file      Family History   Problem Relation Age of Onset    Hypertension Mother     Hyperlipidemia Mother     Hypertension Father     Hyperlipidemia Father      Past Surgical History:   Procedure Laterality Date    ABDOMINAL SURGERY      BACK SURGERY      COCHLEAR IMPLANT Right     HERNIA REPAIR      IVC FILTER INSERTION         Current Outpatient Medications:     chlorthalidone 25 mg tablet, Take 1 tablet (25 mg total) by mouth daily, Disp: 90 tablet, Rfl: 1    ezetimibe (ZETIA) 10 mg tablet, Take 10 mg by mouth every other day , Disp: , Rfl:     Nutritional Supplements (THERALITH XR) TABS, Take by mouth 2 (two) times a day 2 tablets BID, Disp: , Rfl:     omeprazole (PriLOSEC) 20 mg delayed release capsule, Take 20 mg by mouth daily, Disp: , Rfl:     Omega-3 Fatty Acids (Fish Oil) 1200 MG CAPS, Take by mouth 3 (three) times a week, Disp: , Rfl:   Allergies   Allergen Reactions    Moxifloxacin Shortness Of Breath and Hives    Alcohol - Food Allergy      vertigo    Caffeine - Food Allergy      vertigo    Cephalosporins      hyperventilation    Doxycycline     Penicillins Hives    Pravastatin      Other reaction(s): Respiratory Distress    Prednisone Tinnitus     Other reaction(s): Unknown    Tamiflu [Oseltamivir] Lightheadedness     Hyperventilation       Labs:not applicable  Imaging: No results found  Review of Systems:  Review of Systems   All other systems reviewed and are negative  Physical Exam:  /70 (BP Location: Left arm, Patient Position: Sitting, Cuff Size: Standard)   Pulse 76   Ht 5' 7" (1 702 m)   Wt 99 8 kg (220 lb)   BMI 34 46 kg/m²   Physical Exam  Vitals signs reviewed  Constitutional:       Appearance: He is well-developed  HENT:      Head: Normocephalic and atraumatic  Eyes:      Conjunctiva/sclera: Conjunctivae normal       Pupils: Pupils are equal, round, and reactive to light  Neck:      Musculoskeletal: Normal range of motion and neck supple  Cardiovascular:      Rate and Rhythm: Normal rate  Heart sounds: Normal heart sounds  Pulmonary:      Effort: Pulmonary effort is normal       Breath sounds: Normal breath sounds  Skin:     General: Skin is warm and dry  Neurological:      Mental Status: He is alert and oriented to person, place, and time  Discussion/Summary:  Will continue his present medical regimen    Will check a BMP to assess his potassium and provide a supplement as necessary  Will schedule an echo to compared to prior study in reference to his ascending aortic aneurysm  I have asked him to call if there is a problem in the interim otherwise I will see him in follow-up in six months time

## 2021-05-11 ENCOUNTER — IMMUNIZATIONS (OUTPATIENT)
Dept: FAMILY MEDICINE CLINIC | Facility: HOSPITAL | Age: 66
End: 2021-05-11

## 2021-05-11 DIAGNOSIS — Z23 ENCOUNTER FOR IMMUNIZATION: Primary | ICD-10-CM

## 2021-05-11 PROCEDURE — 0002A SARS-COV-2 / COVID-19 MRNA VACCINE (PFIZER-BIONTECH) 30 MCG: CPT

## 2021-05-11 PROCEDURE — 91300 SARS-COV-2 / COVID-19 MRNA VACCINE (PFIZER-BIONTECH) 30 MCG: CPT

## 2021-05-12 ENCOUNTER — OFFICE VISIT (OUTPATIENT)
Dept: CARDIOLOGY CLINIC | Facility: CLINIC | Age: 66
End: 2021-05-12
Payer: COMMERCIAL

## 2021-05-12 VITALS
WEIGHT: 220 LBS | DIASTOLIC BLOOD PRESSURE: 70 MMHG | HEART RATE: 76 BPM | SYSTOLIC BLOOD PRESSURE: 120 MMHG | BODY MASS INDEX: 34.53 KG/M2 | HEIGHT: 67 IN

## 2021-05-12 DIAGNOSIS — I77.810 MILD ASCENDING AORTA DILATATION (HCC): ICD-10-CM

## 2021-05-12 DIAGNOSIS — I10 HYPERTENSION, UNSPECIFIED TYPE: Primary | ICD-10-CM

## 2021-05-12 DIAGNOSIS — E78.00 PURE HYPERCHOLESTEROLEMIA: ICD-10-CM

## 2021-05-12 PROCEDURE — 99214 OFFICE O/P EST MOD 30 MIN: CPT | Performed by: INTERNAL MEDICINE

## 2021-05-12 PROCEDURE — 1160F RVW MEDS BY RX/DR IN RCRD: CPT | Performed by: INTERNAL MEDICINE

## 2021-05-12 PROCEDURE — 93000 ELECTROCARDIOGRAM COMPLETE: CPT | Performed by: INTERNAL MEDICINE

## 2021-05-12 PROCEDURE — 3008F BODY MASS INDEX DOCD: CPT | Performed by: INTERNAL MEDICINE

## 2021-05-12 NOTE — PATIENT INSTRUCTIONS
Will check an echocardiogram and a BMP which will measure your potassium level  Please call if there is a problem  I will see you in follow-up

## 2021-06-11 ENCOUNTER — HOSPITAL ENCOUNTER (OUTPATIENT)
Dept: NON INVASIVE DIAGNOSTICS | Age: 66
Discharge: HOME | End: 2021-06-11
Payer: COMMERCIAL

## 2021-06-11 DIAGNOSIS — I10 HYPERTENSION, UNSPECIFIED TYPE: ICD-10-CM

## 2021-06-11 DIAGNOSIS — E78.00 PURE HYPERCHOLESTEROLEMIA: ICD-10-CM

## 2021-06-11 DIAGNOSIS — I77.810 MILD ASCENDING AORTA DILATATION (HCC): ICD-10-CM

## 2021-06-11 LAB
BUN SERPL-MCNC: 16 MG/DL (ref 8–27)
BUN/CREAT SERPL: 15 (ref 10–24)
CALCIUM SERPL-MCNC: 9.3 MG/DL (ref 8.6–10.2)
CHLORIDE SERPL-SCNC: 101 MMOL/L (ref 96–106)
CO2 SERPL-SCNC: 28 MMOL/L (ref 20–29)
CREAT SERPL-MCNC: 1.05 MG/DL (ref 0.76–1.27)
GLUCOSE SERPL-MCNC: 117 MG/DL (ref 65–99)
POTASSIUM SERPL-SCNC: 3 MMOL/L (ref 3.5–5.2)
SL AMB EGFR AFRICAN AMERICAN: 85 ML/MIN/1.73
SL AMB EGFR NON AFRICAN AMERICAN: 74 ML/MIN/1.73
SODIUM SERPL-SCNC: 141 MMOL/L (ref 134–144)

## 2021-06-11 PROCEDURE — 93306 TTE W/DOPPLER COMPLETE: CPT

## 2021-06-11 PROCEDURE — 93306 TTE W/DOPPLER COMPLETE: CPT | Performed by: INTERNAL MEDICINE

## 2021-06-11 NOTE — RESULT ENCOUNTER NOTE
Please call the patient regarding his abnormal result  Potassium is too low  Please start KCl 20 mEq twice a day and recheck BMP in one week  Thank you

## 2021-06-15 DIAGNOSIS — E87.6 HYPOKALEMIA: Primary | ICD-10-CM

## 2021-06-15 NOTE — TELEPHONE ENCOUNTER
----- Message from Patrick Ashby sent at 6/14/2021 11:05 AM EDT -----  Johana Knight!  ----- Message -----  From: Abi Connolly  Sent: 6/14/2021   9:24 AM EDT  To: Patrick Isma      ----- Message -----  From: Mary Liu MD  Sent: 6/11/2021   8:14 AM EDT  To: [facility]    Please call the patient regarding his abnormal result  Potassium is too low  Please start KCl 20 mEq twice a day and recheck BMP in one week  Thank you

## 2021-06-15 NOTE — TELEPHONE ENCOUNTER
Called, spoke to pt  Message relayed as given  Pt verbalized understanding  Labs ordered   Rx sent for approval

## 2021-06-16 RX ORDER — POTASSIUM CHLORIDE 20 MEQ/1
20 TABLET, EXTENDED RELEASE ORAL 2 TIMES DAILY
Qty: 60 TABLET | Refills: 2 | Status: SHIPPED | OUTPATIENT
Start: 2021-06-16 | End: 2021-09-08

## 2021-06-30 LAB
BUN SERPL-MCNC: 19 MG/DL (ref 8–27)
BUN/CREAT SERPL: 17 (ref 10–24)
CALCIUM SERPL-MCNC: 9.4 MG/DL (ref 8.6–10.2)
CHLORIDE SERPL-SCNC: 102 MMOL/L (ref 96–106)
CO2 SERPL-SCNC: 28 MMOL/L (ref 20–29)
CREAT SERPL-MCNC: 1.13 MG/DL (ref 0.76–1.27)
GLUCOSE SERPL-MCNC: 112 MG/DL (ref 65–99)
POTASSIUM SERPL-SCNC: 3.7 MMOL/L (ref 3.5–5.2)
SL AMB EGFR AFRICAN AMERICAN: 78 ML/MIN/1.73
SL AMB EGFR NON AFRICAN AMERICAN: 67 ML/MIN/1.73
SODIUM SERPL-SCNC: 140 MMOL/L (ref 134–144)

## 2021-09-08 DIAGNOSIS — E87.6 HYPOKALEMIA: ICD-10-CM

## 2021-09-08 RX ORDER — POTASSIUM CHLORIDE 1500 MG/1
TABLET, EXTENDED RELEASE ORAL
Qty: 180 TABLET | Refills: 3 | Status: SHIPPED | OUTPATIENT
Start: 2021-09-08

## 2021-10-08 DIAGNOSIS — I10 HYPERTENSION, UNSPECIFIED TYPE: ICD-10-CM

## 2021-10-08 RX ORDER — CHLORTHALIDONE 25 MG/1
TABLET ORAL
Qty: 90 TABLET | Refills: 1 | Status: SHIPPED | OUTPATIENT
Start: 2021-10-08 | End: 2022-04-08

## 2021-11-08 ENCOUNTER — HOSPITAL ENCOUNTER (OUTPATIENT)
Dept: CT IMAGING | Facility: HOSPITAL | Age: 66
Discharge: HOME | End: 2021-11-08
Payer: COMMERCIAL

## 2021-11-08 DIAGNOSIS — R10.13 EPIGASTRIC PAIN: ICD-10-CM

## 2021-11-08 PROCEDURE — 74177 CT ABD & PELVIS W/CONTRAST: CPT

## 2021-11-08 RX ADMIN — IOHEXOL 100 ML: 350 INJECTION, SOLUTION INTRAVENOUS at 11:15

## 2021-12-05 ENCOUNTER — OFFICE VISIT (OUTPATIENT)
Dept: URGENT CARE | Facility: CLINIC | Age: 66
End: 2021-12-05
Payer: COMMERCIAL

## 2021-12-05 ENCOUNTER — APPOINTMENT (OUTPATIENT)
Dept: RADIOLOGY | Facility: CLINIC | Age: 66
End: 2021-12-05
Payer: COMMERCIAL

## 2021-12-05 VITALS
RESPIRATION RATE: 16 BRPM | TEMPERATURE: 97.8 F | HEART RATE: 81 BPM | SYSTOLIC BLOOD PRESSURE: 140 MMHG | DIASTOLIC BLOOD PRESSURE: 90 MMHG | OXYGEN SATURATION: 95 %

## 2021-12-05 DIAGNOSIS — M25.512 ACUTE PAIN OF LEFT SHOULDER: Primary | ICD-10-CM

## 2021-12-05 DIAGNOSIS — M25.512 ACUTE PAIN OF LEFT SHOULDER: ICD-10-CM

## 2021-12-05 PROCEDURE — 99213 OFFICE O/P EST LOW 20 MIN: CPT | Performed by: PHYSICIAN ASSISTANT

## 2021-12-05 PROCEDURE — 73030 X-RAY EXAM OF SHOULDER: CPT

## 2021-12-15 ENCOUNTER — OFFICE VISIT (OUTPATIENT)
Dept: OBGYN CLINIC | Facility: CLINIC | Age: 66
End: 2021-12-15
Payer: COMMERCIAL

## 2021-12-15 VITALS
BODY MASS INDEX: 35.12 KG/M2 | SYSTOLIC BLOOD PRESSURE: 126 MMHG | HEIGHT: 67 IN | DIASTOLIC BLOOD PRESSURE: 78 MMHG | WEIGHT: 223.8 LBS

## 2021-12-15 DIAGNOSIS — M25.512 ACUTE PAIN OF LEFT SHOULDER: Primary | ICD-10-CM

## 2021-12-15 DIAGNOSIS — M24.812 INTERNAL DERANGEMENT OF SHOULDER, LEFT: ICD-10-CM

## 2021-12-15 PROCEDURE — 99214 OFFICE O/P EST MOD 30 MIN: CPT | Performed by: ORTHOPAEDIC SURGERY

## 2022-01-11 NOTE — NURSING NOTE
Call placed to patient to discuss upcoming left shoulder CT arthrogram at [address] [facility]  Allergies reviewed and verified patient does not currently take any anticoagulant medications  Pre procedure instructions including diet and taking own medications discussed with patient  Patient instructed that he may eat normally and take medications as usual before the procedure  Procedure and post procedure expectations and instructions reviewed with the patient  Patient verbalizes understanding and denies any questions at this time  Reminded of the location, date and time of the expected procedure  Name and contact number provided in case patient has any further questions

## 2022-01-19 ENCOUNTER — HOSPITAL ENCOUNTER (OUTPATIENT)
Dept: RADIOLOGY | Facility: HOSPITAL | Age: 67
Discharge: HOME | End: 2022-01-19
Attending: ORTHOPAEDIC SURGERY
Payer: COMMERCIAL

## 2022-01-19 ENCOUNTER — HOSPITAL ENCOUNTER (OUTPATIENT)
Dept: CT IMAGING | Facility: HOSPITAL | Age: 67
Discharge: HOME | End: 2022-01-19
Attending: ORTHOPAEDIC SURGERY
Payer: COMMERCIAL

## 2022-01-19 DIAGNOSIS — M24.812 INTERNAL DERANGEMENT OF SHOULDER, LEFT: ICD-10-CM

## 2022-01-19 DIAGNOSIS — M25.512 ACUTE PAIN OF LEFT SHOULDER: ICD-10-CM

## 2022-01-19 PROCEDURE — G1004 CDSM NDSC: HCPCS

## 2022-01-19 PROCEDURE — 23350 INJECTION FOR SHOULDER X-RAY: CPT

## 2022-01-19 PROCEDURE — 73201 CT UPPER EXTREMITY W/DYE: CPT

## 2022-01-19 PROCEDURE — 77002 NEEDLE LOCALIZATION BY XRAY: CPT

## 2022-01-19 RX ORDER — SODIUM CHLORIDE 9 MG/ML
5 INJECTION INTRAVENOUS
Status: DISCONTINUED | OUTPATIENT
Start: 2022-01-19 | End: 2022-01-20 | Stop reason: HOSPADM

## 2022-01-19 RX ORDER — LIDOCAINE WITH 8.4% SOD BICARB 0.9%(10ML)
5 SYRINGE (ML) INJECTION ONCE
Status: DISCONTINUED | OUTPATIENT
Start: 2022-01-19 | End: 2022-01-20 | Stop reason: HOSPADM

## 2022-01-19 RX ADMIN — IOHEXOL 100 ML: 300 INJECTION, SOLUTION INTRAVENOUS at 14:16

## 2022-01-19 RX ADMIN — IOHEXOL 100 ML: 350 INJECTION, SOLUTION INTRAVENOUS at 14:11

## 2022-01-21 ENCOUNTER — OFFICE VISIT (OUTPATIENT)
Dept: OBGYN CLINIC | Facility: CLINIC | Age: 67
End: 2022-01-21
Payer: COMMERCIAL

## 2022-01-21 VITALS
WEIGHT: 223 LBS | HEIGHT: 67 IN | SYSTOLIC BLOOD PRESSURE: 126 MMHG | BODY MASS INDEX: 35 KG/M2 | DIASTOLIC BLOOD PRESSURE: 80 MMHG

## 2022-01-21 DIAGNOSIS — M75.122 NONTRAUMATIC COMPLETE TEAR OF LEFT ROTATOR CUFF: Primary | ICD-10-CM

## 2022-01-21 PROCEDURE — 99213 OFFICE O/P EST LOW 20 MIN: CPT | Performed by: ORTHOPAEDIC SURGERY

## 2022-01-21 NOTE — ASSESSMENT & PLAN NOTE
Findings consistent with left shoulder chronic rotator cuff tear with strain  Discussed findings and treatment options with the patient  I reviewed patient's left shoulder arthrogram CT  I discussed prognosis of his condition  Since patient has pain and function has significantly improved, I recommended patient to attend physical therapy for shoulder rehabilitation  I do not recommend surgical treatment at this point  Surgical intervention may not improve the function of his arm and potentially cause more pain and stiffness  I explained to patient that his rotator cuff tear has been chronic and he most likely strained his shoulder to cause the pain and difficulty using his arm  His symptoms seem to have improved  I will see patient back in 6-8 weeks for re-evaluation  All patient's questions were answered to his satisfaction  This note is created using dictation transcription  It may contain typographical errors, grammatical errors, improperly dictated words, background noise and other errors

## 2022-01-21 NOTE — PROGRESS NOTES
Assessment:     1  Nontraumatic complete tear of left rotator cuff        Plan:     Problem List Items Addressed This Visit        Musculoskeletal and Integument    Nontraumatic complete tear of left rotator cuff - Primary     Findings consistent with left shoulder chronic rotator cuff tear with strain  Discussed findings and treatment options with the patient  I reviewed patient's left shoulder arthrogram CT  I discussed prognosis of his condition  Since patient has pain and function has significantly improved, I recommended patient to attend physical therapy for shoulder rehabilitation  I do not recommend surgical treatment at this point  Surgical intervention may not improve the function of his arm and potentially cause more pain and stiffness  I explained to patient that his rotator cuff tear has been chronic and he most likely strained his shoulder to cause the pain and difficulty using his arm  His symptoms seem to have improved  I will see patient back in 6-8 weeks for re-evaluation  All patient's questions were answered to his satisfaction  This note is created using dictation transcription  It may contain typographical errors, grammatical errors, improperly dictated words, background noise and other errors  Relevant Orders    Ambulatory Referral to Physical Therapy         Subjective:     Patient ID: Pa Berrios  is a 77 y o  male  Chief Complaint:  77 yr old male in for evaluation of his left shoulder  He has had on and off shoulder pain for years  2 weeks ago in woods, he was breaking up sticks he was falling backwards while sitting and reached out to [employer] to prevent from falling and noted acute anterior shoulder pain when he grabbed the branch  After injury he has had difficult time reaching overhead, behind and across his body  He has weakness  He noticed bruising into biceps a week later  No pain at rest, primarily with movement  Denies any numbness or tingling    Today, patient's pain has improved significantly  He is able to actively lift his arm above shoulder level without significant pain  Patient is here to review his left shoulder arthrogram CT scan  Allergy:  Allergies   Allergen Reactions    Moxifloxacin Shortness Of Breath and Hives    Alcohol - Food Allergy      vertigo    Caffeine - Food Allergy      vertigo    Cephalosporins      hyperventilation    Doxycycline     Penicillins Hives    Pravastatin      Other reaction(s): Respiratory Distress    Prednisone Tinnitus     Other reaction(s): Unknown    Tamiflu [Oseltamivir] Lightheadedness     Hyperventilation     Medications:  all current active meds have been reviewed  Past Medical History:  Past Medical History:   Diagnosis Date    GERD (gastroesophageal reflux disease)     Hyperlipidemia     Kidney stone     Meniere disease     Pulmonary embolism (HCC)      Past Surgical History:  Past Surgical History:   Procedure Laterality Date    ABDOMINAL SURGERY      BACK SURGERY      COCHLEAR IMPLANT Right     FL INJECTION LEFT SHOULDER (ARTHROGRAM)  1/19/2022    HERNIA REPAIR      IVC FILTER INSERTION       Family History:  Family History   Problem Relation Age of Onset    Hypertension Mother     Hyperlipidemia Mother     Hypertension Father     Hyperlipidemia Father      Social History:  Social History     Substance and Sexual Activity   Alcohol Use No     Social History     Substance and Sexual Activity   Drug Use No     Social History     Tobacco Use   Smoking Status Never Smoker   Smokeless Tobacco Never Used     Review of Systems   Constitutional: Negative  HENT: Negative  Eyes: Negative  Respiratory: Negative  Cardiovascular: Negative  Gastrointestinal: Negative  Endocrine: Negative  Genitourinary: Negative  Musculoskeletal: Positive for arthralgias (Left shoulder)  Negative for neck pain  Skin: Negative  Neurological: Positive for weakness (Left arm)  Hematological: Negative  Psychiatric/Behavioral: Negative  Objective:  BP Readings from Last 1 Encounters:   01/21/22 126/80      Wt Readings from Last 1 Encounters:   01/21/22 101 kg (223 lb)      BMI:   Estimated body mass index is 34 93 kg/m² as calculated from the following:    Height as of this encounter: 5' 7" (1 702 m)  Weight as of this encounter: 101 kg (223 lb)  BSA:   Estimated body surface area is 2 12 meters squared as calculated from the following:    Height as of this encounter: 5' 7" (1 702 m)  Weight as of this encounter: 101 kg (223 lb)  Physical Exam  Vitals and nursing note reviewed  Constitutional:       Appearance: Normal appearance  He is well-developed  HENT:      Head: Normocephalic and atraumatic  Right Ear: External ear normal       Left Ear: External ear normal    Eyes:      Extraocular Movements: Extraocular movements intact  Conjunctiva/sclera: Conjunctivae normal    Pulmonary:      Effort: Pulmonary effort is normal    Musculoskeletal:      Cervical back: Neck supple  Skin:     General: Skin is warm  Neurological:      Mental Status: He is alert and oriented to person, place, and time  Psychiatric:         Mood and Affect: Mood normal          Behavior: Behavior normal        Left Shoulder Exam     Tenderness   The patient is experiencing no tenderness  Range of Motion   Active abduction: normal   Passive abduction: normal   Forward flexion: normal   Internal rotation 0 degrees: normal     Muscle Strength   Abduction: 4/5   Internal rotation: 5/5   External rotation: 4/5   Biceps: 5/5     Tests   Drop arm: negative    Other   Erythema: absent  Sensation: normal  Pulse: present             I have personally reviewed pertinent films in PACS and my interpretation is Left shoulder CT arthrogram show full-thickness rotator cuff tear of the supraspinatus and infraspinatus tendon retracted medially toward the glenohumeral joint    Cannot assess quality of the muscles

## 2022-01-27 ENCOUNTER — EVALUATION (OUTPATIENT)
Dept: PHYSICAL THERAPY | Facility: CLINIC | Age: 67
End: 2022-01-27
Payer: COMMERCIAL

## 2022-01-27 DIAGNOSIS — M75.122 NONTRAUMATIC COMPLETE TEAR OF LEFT ROTATOR CUFF: ICD-10-CM

## 2022-01-27 PROCEDURE — 97161 PT EVAL LOW COMPLEX 20 MIN: CPT | Performed by: PHYSICAL THERAPIST

## 2022-01-27 PROCEDURE — 97110 THERAPEUTIC EXERCISES: CPT | Performed by: PHYSICAL THERAPIST

## 2022-01-27 NOTE — PROGRESS NOTES
PT Evaluation     Today's date: 2022  Patient name: Gilda Arreola  : 1955  MRN: [id number]  Referring provider: Zeus Denis MD  Dx:   Encounter Diagnosis     ICD-10-CM    1  Nontraumatic complete tear of left rotator cuff  M75 122 Ambulatory Referral to Physical Therapy                  Assessment  Assessment details: Gilda Arreola  is a 77 y o  male who presents with pain, decreased strength, and decreased ROM  Due to these impairments, patient has difficulty performing a/iadls, recreational activities and work-related activities  Patient's clinical presentation is consistent with their referring diagnosis of nontraumatic complete tear of left rotator cuff  Patient would benefit from skilled physical therapy to address their aforementioned impairments, improve their level of function and to improve their overall quality of life  Impairments: abnormal or restricted ROM, impaired physical strength and pain with function  Understanding of Dx/Px/POC: good   Prognosis: good    Goals  Short term goals - to be achieved in 4 weeks:    Decrease pain 20-50%  Increase strength by 1/2 grade  Improve range of motion by 25%  Long term goals - to be achieved by discharge:    Overhead reaching is improved to maximal level of function  Lifting is improved to maximal level of function  IADL performance in related activities is improved to maximal level of function  Performance in related work activities is improved to maximal level of function       Plan  Planned therapy interventions: manual therapy, neuromuscular re-education, patient education, strengthening, stretching, therapeutic exercise and home exercise program  Frequency: 2x week  Duration in visits: 12  Duration in weeks: 6  Plan of Care beginning date: 2022  Plan of Care expiration date: 3/10/2022  Treatment plan discussed with: patient        Subjective Evaluation    History of Present Illness  Mechanism of injury: Patient refers to PT with diagnosis of left RTC tear  Patient received CT arthrogram of left shoulder on 22 which revealed complete supraspinatus and infraspinatus tears retracted to the level of the superior humeral head  Patient states pain began approximately three months when he fell over while hunting  Patient states he has carpal tunnel syndrome in bilateral UE's which he has had for multiple years  Patient states difficulty with overhead reaching activities with his left UE  Patient states pain when reaching behind his back  Patient states his job require frequent UE use and occasional lifting activities     Pain  Current pain ratin  At best pain ratin  At worst pain ratin    Patient Goals  Patient goals for therapy: increased strength and increased motion          Objective     Active Range of Motion   Left Shoulder   Flexion: 160 degrees   Abduction: 160 degrees   External rotation BTH: T2   Internal rotation BTB: T8     Passive Range of Motion   Left Shoulder   Flexion: 165 degrees   Abduction: 165 degrees   External rotation 90°: 70 degrees   Internal rotation 90°: 55 degrees     Strength/Myotome Testing     Left Shoulder     Planes of Motion   Flexion: 4- (pain)   Abduction: 3+ (pain)   External rotation at 0°: 3-   Internal rotation at 0°: 4+     Isolated Muscles   Lower trapezius: 4-   Middle trapezius: 4-              Precautions: Hx of Meniere's disease, GERD, Thoracic aortic aneurysm       Manuals             Left shoulder PROM                                                    Neuro Re-Ed             Prone T's HEP            Prone I's             Prone Y's             Scap circles on wall w/towel                                                    Ther Ex             UBE             TB IR HEP            TB extension             TB rows             Standing flex             Standing scaption             Standing abd             Sidelying ER HEP            Ther Activity Gait Training                                       Modalities                                           HEP access code: [email or web address]

## 2022-02-01 ENCOUNTER — OFFICE VISIT (OUTPATIENT)
Dept: PHYSICAL THERAPY | Facility: CLINIC | Age: 67
End: 2022-02-01
Payer: COMMERCIAL

## 2022-02-01 DIAGNOSIS — M75.122 NONTRAUMATIC COMPLETE TEAR OF LEFT ROTATOR CUFF: Primary | ICD-10-CM

## 2022-02-01 PROCEDURE — 97110 THERAPEUTIC EXERCISES: CPT | Performed by: PHYSICAL THERAPIST

## 2022-02-01 PROCEDURE — 97112 NEUROMUSCULAR REEDUCATION: CPT | Performed by: PHYSICAL THERAPIST

## 2022-02-01 NOTE — PROGRESS NOTES
Daily Note     Today's date: 2022  Patient name: Jake Christianson  : 1955  MRN: [id number]  Referring provider: Kin Durán MD  Dx:   Encounter Diagnosis     ICD-10-CM    1  Nontraumatic complete tear of left rotator cuff  M75 122                   Subjective: min-mod soreness from "overdoing it" since IE      Objective: See treatment diary below      Assessment: Tolerated treatment fair  Patient demonstrated fatigue post treatment and would benefit from continued PT      Plan: Progress treatment as tolerated    Add DB resistance to Prone scapular program NV     Precautions: Hx of Meniere's disease, GERD, Thoracic aortic aneurysm       Manuals            Left shoulder PROM  NS                                                  Neuro Re-Ed             Prone T's HEP 2x10 3"           Prone I's  2x10 3"           Prone Y's  2x10 3"           Scap circles on wall w/towel                                                    Ther Ex             UBE  6 min           TB IR HEP            TB extension  3x10 GTB           TB rows  3x10 GTB           Standing flex  2x10 1lb DB           Standing scaption  2x10 1lb DB           Standing abd  2x10 1lb DB           Sidelying ER HEP 2x10 1lb DB           Ther Activity                                       Gait Training                                       Modalities

## 2022-02-04 ENCOUNTER — OFFICE VISIT (OUTPATIENT)
Dept: PHYSICAL THERAPY | Facility: CLINIC | Age: 67
End: 2022-02-04
Payer: COMMERCIAL

## 2022-02-04 DIAGNOSIS — M75.122 NONTRAUMATIC COMPLETE TEAR OF LEFT ROTATOR CUFF: Primary | ICD-10-CM

## 2022-02-04 PROCEDURE — 97110 THERAPEUTIC EXERCISES: CPT | Performed by: PHYSICAL THERAPIST

## 2022-02-04 PROCEDURE — 97112 NEUROMUSCULAR REEDUCATION: CPT | Performed by: PHYSICAL THERAPIST

## 2022-02-04 NOTE — PROGRESS NOTES
Daily Note     Today's date: 2022  Patient name: Guerline Hebert  : 1955  MRN: [id number]  Referring provider: Martha Millard MD  Dx:   Encounter Diagnosis     ICD-10-CM    1  Nontraumatic complete tear of left rotator cuff  M75 122                   Subjective: no pain today, was able to complete HEP with no issue      Objective: See treatment diary below      Assessment: Tolerated treatment well with treatment progression/addition of DB to resistance scapular therex today as noted below  Patient demonstrated fatigue post treatment and would benefit from continued PT      Plan: Progress treatment as tolerated     increase resistance to therex as tolerated     Precautions: Hx of Meniere's disease, GERD, Thoracic aortic aneurysm       Manuals           Left shoulder PROM  NS                                                  Neuro Re-Ed             Prone T's HEP 2x10 3" 3x10 3" 2lb DB          Prone I's  2x10 3" 3x10 3" 2lb DB          Prone Y's  2x10 3" 3x10 3" 2lb DB          Scap circles on wall w/towel                                                    Ther Ex             UBE  6 min 6 min L1 5          TB IR HEP            TB extension  3x10 GTB 3x10 BTB          TB rows  3x10 GTB 3x10 BTB          Standing flex  2x10 1lb DB 3x10 2lb          Standing scaption  2x10 1lb DB 3x10 2lb          Standing abd  2x10 1lb DB 3x10 2lb          Sidelying ER HEP 2x10 1lb DB 3x10 2lb          Ther Activity                                       Gait Training                                       Modalities

## 2022-02-08 ENCOUNTER — OFFICE VISIT (OUTPATIENT)
Dept: PHYSICAL THERAPY | Facility: CLINIC | Age: 67
End: 2022-02-08
Payer: COMMERCIAL

## 2022-02-08 DIAGNOSIS — M75.122 NONTRAUMATIC COMPLETE TEAR OF LEFT ROTATOR CUFF: Primary | ICD-10-CM

## 2022-02-08 PROCEDURE — 97140 MANUAL THERAPY 1/> REGIONS: CPT | Performed by: PHYSICAL THERAPIST

## 2022-02-08 PROCEDURE — 97112 NEUROMUSCULAR REEDUCATION: CPT | Performed by: PHYSICAL THERAPIST

## 2022-02-08 NOTE — PROGRESS NOTES
Daily Note     Today's date: 2022  Patient name: Karolina Mena  : 1955  MRN: [id number]  Referring provider: Melissa Ahumada MD  Dx:   Encounter Diagnosis     ICD-10-CM    1  Nontraumatic complete tear of left rotator cuff  M75 122                   Subjective: Compliant with HEP, no questions regarding POC, motivated to continue PT      Objective: See treatment diary below      Assessment: Tolerated treatment well with treatment progression with increased DB resistance scapular therex today as noted below  Patient demonstrated fatigue post treatment and would benefit from continued PT      Plan: Progress treatment as tolerated     increase resistance to therex as tolerated     Precautions: Hx of Meniere's disease, GERD, Thoracic aortic aneurysm       Manuals          Left shoulder PROM  NS                                                  Neuro Re-Ed             Prone T's HEP 2x10 3" 3x10 3" 2lb DB          Prone I's  2x10 3" 3x10 3" 2lb DB          Prone Y's  2x10 3" 3x10 3" 2lb DB          Scap circles on wall w/towel                                                    Ther Ex             UBE  6 min 6 min L1 5 3'/3' L2         TB IR HEP            TB extension  3x10 GTB 3x10 BTB 3x10 PTB         TB rows  3x10 GTB 3x10 BTB CC 45lb 3x10         Standing flex  2x10 1lb DB 3x10 2lb 3x10 3lb         Standing scaption  2x10 1lb DB 3x10 2lb 3x10 3lb         Standing abd  2x10 1lb DB 3x10 2lb 3x10 3lb         Sidelying ER HEP 2x10 1lb DB 3x10 2lb          Ther Activity                                       Gait Training                                       Modalities

## 2022-02-10 ENCOUNTER — OFFICE VISIT (OUTPATIENT)
Dept: PHYSICAL THERAPY | Facility: CLINIC | Age: 67
End: 2022-02-10
Payer: COMMERCIAL

## 2022-02-10 DIAGNOSIS — M75.122 NONTRAUMATIC COMPLETE TEAR OF LEFT ROTATOR CUFF: Primary | ICD-10-CM

## 2022-02-10 PROCEDURE — 97110 THERAPEUTIC EXERCISES: CPT

## 2022-02-10 NOTE — PROGRESS NOTES
Daily Note     Today's date: 2/10/2022  Patient name: Blayne Maddox  : 1955  MRN: [id number]  Referring provider: Esa Garcia MD  Dx:   Encounter Diagnosis     ICD-10-CM    1  Nontraumatic complete tear of left rotator cuff  M75 122                   Subjective: Pt reports he is sore because he works his shoulder at PT and at the gym at work      Objective: See treatment diary below      Assessment: Tolerated treatment well  Continues to tolerate strengthening TE's with some mild difficulty- painful arch at 90 degrees of flexion and ABD  Patient demonstrated fatigue post treatment and would benefit from continued PT      Plan: Progress treatment as tolerated     increase resistance to therex as tolerated     Precautions: Hx of Meniere's disease, GERD, Thoracic aortic aneurysm       Manuals 1/27 2/1 2/4 2/8 2/10        Left shoulder PROM  NS                                                  Neuro Re-Ed             Prone T's HEP 2x10 3" 3x10 3" 2lb DB  2x10 3" 2lb DB        Prone I's  2x10 3" 3x10 3" 2lb DB  2x10 3" 2lb DB        Prone Y's  2x10 3" 3x10 3" 2lb DB  2x10 3" 2lb DB        Prone Row     2x10 3" 2lb DB        Scap circles on wall w/towel                                                    Ther Ex             UBE  6 min 6 min L1 5 3'/3' L2 3'/3'  L2        TB IR HEP            TB extension  3x10 GTB 3x10 BTB 3x10 PTB CC 45lb 3x10        TB rows  3x10 GTB 3x10 BTB CC 45lb 3x10 CC 45lb 3x10        Standing flex  2x10 1lb DB 3x10 2lb 3x10 3lb 3x10 3lb        Standing scaption  2x10 1lb DB 3x10 2lb 3x10 3lb 3x10 3lb        Standing abd  2x10 1lb DB 3x10 2lb 3x10 3lb 3x10 3lb        Sidelying ER HEP 2x10 1lb DB 3x10 2lb          Ther Activity                                       Gait Training                                       Modalities

## 2022-02-17 ENCOUNTER — APPOINTMENT (OUTPATIENT)
Dept: PHYSICAL THERAPY | Facility: CLINIC | Age: 67
End: 2022-02-17
Payer: COMMERCIAL

## 2022-02-18 ENCOUNTER — OFFICE VISIT (OUTPATIENT)
Dept: PHYSICAL THERAPY | Facility: CLINIC | Age: 67
End: 2022-02-18
Payer: COMMERCIAL

## 2022-02-18 DIAGNOSIS — M75.122 NONTRAUMATIC COMPLETE TEAR OF LEFT ROTATOR CUFF: Primary | ICD-10-CM

## 2022-02-18 PROCEDURE — 97112 NEUROMUSCULAR REEDUCATION: CPT | Performed by: PHYSICAL THERAPIST

## 2022-02-18 PROCEDURE — 97110 THERAPEUTIC EXERCISES: CPT | Performed by: PHYSICAL THERAPIST

## 2022-02-21 ENCOUNTER — OFFICE VISIT (OUTPATIENT)
Dept: PHYSICAL THERAPY | Facility: CLINIC | Age: 67
End: 2022-02-21
Payer: COMMERCIAL

## 2022-02-21 DIAGNOSIS — M75.122 NONTRAUMATIC COMPLETE TEAR OF LEFT ROTATOR CUFF: Primary | ICD-10-CM

## 2022-02-21 PROCEDURE — 97112 NEUROMUSCULAR REEDUCATION: CPT | Performed by: PHYSICAL THERAPIST

## 2022-02-21 PROCEDURE — 97110 THERAPEUTIC EXERCISES: CPT | Performed by: PHYSICAL THERAPIST

## 2022-02-21 NOTE — PROGRESS NOTES
Daily Note     Today's date: 2022  Patient name: Catherine Vieyra  : 1955  MRN: [id number]  Referring provider: Bri Bradley MD  Dx:   Encounter Diagnosis     ICD-10-CM    1  Nontraumatic complete tear of left rotator cuff  M75 122                   Subjective: Helped move furniture over the weekend with very little inc in shoulder sxs        Objective: See treatment diary below      Assessment: Tolerated treatment well with inc in exercise parameters/resistance as noted below  Continues to tolerate strengthening TE's with some min-mild difficulty and AROM of External rotation remains most limited deficit  Patient demonstrated fatigue post treatment and would benefit from continued PT      Plan: Progress treatment as tolerated     increase therex parameters as tolerated     Precautions: Hx of Meniere's disease, GERD, Thoracic aortic aneurysm       Manuals 1/27 2/1 2/4 2/8 2/10 2/18 2/21      Left shoulder PROM  NS                                                  Neuro Re-Ed             Prone T's HEP 2x10 3" 3x10 3" 2lb DB  2x10 3" 2lb DB 2x10 3" 2lb DB  2x10 3" 3lb     Prone I's  2x10 3" 3x10 3" 2lb DB  2x10 3" 2lb DB 2x10 3" 2lb DB  2x10 3" 3lb     Prone Y's  2x10 3" 3x10 3" 2lb DB  2x10 3" 2lb DB 2x10 3" 2lb DB  2x10 3" 3lb     Prone Row     2x10 3" 2lb DB 2x10 3" 2lb DB  2x10 3" 3lb     Scap circles on wall w/towel             Pulleys       2 min                                Ther Ex             UBE  6 min 6 min L1 5 3'/3' L2 3'/3'  L2 3'/3' L4 3'/3' L5      TB H abd      3x10 OTB 3x10 OTB      TB extension  3x10 GTB 3x10 BTB 3x10 PTB PTB 3x10 PTB 3x12 PTB 3x12      TB rows  3x10 GTB 3x10 BTB CC 45lb 3x10 CC 45lb 3x10 CC 45lb 3x12 CC 55lb 3x8      Standing flex  2x10 1lb DB 3x10 2lb 3x10 3lb 3x10 3lb 3x12 3lb 3x12 3lb      Standing scaption  2x10 1lb DB 3x10 2lb 3x10 3lb 3x10 3lb 3x12 3lb 3x12 3lb      Standing abd  2x10 1lb DB 3x10 2lb 3x10 3lb 3x10 3lb 3x12 3lb 3x12 3lb      Sidelying ER HEP 2x10 1lb DB 3x10 2lb   3x10 Peach TB no monies 3x10 Peach TB no monies      Ther Activity                                       Gait Training                                       Modalities

## 2022-02-24 ENCOUNTER — OFFICE VISIT (OUTPATIENT)
Dept: PHYSICAL THERAPY | Facility: CLINIC | Age: 67
End: 2022-02-24
Payer: COMMERCIAL

## 2022-02-24 DIAGNOSIS — M75.122 NONTRAUMATIC COMPLETE TEAR OF LEFT ROTATOR CUFF: Primary | ICD-10-CM

## 2022-02-24 PROCEDURE — 97112 NEUROMUSCULAR REEDUCATION: CPT | Performed by: PHYSICAL THERAPIST

## 2022-02-24 PROCEDURE — 97140 MANUAL THERAPY 1/> REGIONS: CPT | Performed by: PHYSICAL THERAPIST

## 2022-02-24 NOTE — PROGRESS NOTES
Daily Note     Today's date: 2022  Patient name: Hector Julien  : 1955  MRN: [id number]  Referring provider: Yolanda Austin MD  Dx:   Encounter Diagnosis     ICD-10-CM    1  Nontraumatic complete tear of left rotator cuff  M75 122                   Subjective: Compliant with HEP, no questions regarding POC, motivated to continue PT        Objective: See treatment diary below      Assessment: Tolerated treatment well with continued inc in exercise parameters/resistance as noted below  Continues to tolerate strengthening TE's with some min-mild difficulty and AROM of External rotation remains most limited deficit  Patient demonstrated fatigue post treatment and would benefit from continued PT      Plan: Progress treatment as tolerated     increase therex parameters as tolerated     Precautions: Hx of Meniere's disease, GERD, Thoracic aortic aneurysm       Manuals 1/27 2/1 2/4 2/8 2/10 2/18 2/21 2/24     Left shoulder PROM  NS                                                  Neuro Re-Ed             Prone T's HEP 2x10 3" 3x10 3" 2lb DB  2x10 3" 2lb DB 2x10 3" 2lb DB 2x10 3" 3lb 2x5 3" 4lb     Prone I's  2x10 3" 3x10 3" 2lb DB  2x10 3" 2lb DB 2x10 3" 2lb DB 2x10 3" 3lb 2x5 3" 4lb     Prone Y's  2x10 3" 3x10 3" 2lb DB  2x10 3" 2lb DB 2x10 3" 2lb DB 2x10 3" 3lb 2x5 3" 4lb     Prone Row     2x10 3" 2lb DB 2x10 3" 2lb DB 2x10 3" 3lb 2x5 3" 4lb     Scap circles on wall w/towel             Pulleys       2 min                                Ther Ex             UBE  6 min 6 min L1 5 3'/3' L2 3'/3'  L2 3'/3' L4 3'/3' L5 3'/3' L 6     TB H abd      3x10 OTB 3x10 OTB      TB extension  3x10 GTB 3x10 BTB 3x10 PTB PTB 3x10 PTB 3x12 PTB 3x12      TB rows  3x10 GTB 3x10 BTB CC 45lb 3x10 CC 45lb 3x10 CC 45lb 3x12 CC 55lb 3x8 CC 55lb 3x8     Standing flex  2x10 1lb DB 3x10 2lb 3x10 3lb 3x10 3lb 3x12 3lb 3x12 3lb 3x6 4lb     Standing scaption  2x10 1lb DB 3x10 2lb 3x10 3lb 3x10 3lb 3x12 3lb 3x12 3lb 3x6 4lb     Standing abd 2x10 1lb DB 3x10 2lb 3x10 3lb 3x10 3lb 3x12 3lb 3x12 3lb 3x6 4lb     Sidelying ER HEP 2x10 1lb DB 3x10 2lb   3x10 Peach TB no monies 3x10 Peach TB no monies 3x10 peach TB no monies     Ther Activity                                       Gait Training                                       Modalities

## 2022-02-28 ENCOUNTER — OFFICE VISIT (OUTPATIENT)
Dept: PHYSICAL THERAPY | Facility: CLINIC | Age: 67
End: 2022-02-28
Payer: COMMERCIAL

## 2022-02-28 DIAGNOSIS — M75.122 NONTRAUMATIC COMPLETE TEAR OF LEFT ROTATOR CUFF: Primary | ICD-10-CM

## 2022-02-28 PROCEDURE — 97110 THERAPEUTIC EXERCISES: CPT | Performed by: PHYSICAL THERAPIST

## 2022-02-28 PROCEDURE — 97112 NEUROMUSCULAR REEDUCATION: CPT | Performed by: PHYSICAL THERAPIST

## 2022-02-28 NOTE — PROGRESS NOTES
Daily Note     Today's date: 2022  Patient name: Blayne Maddox  : 1955  MRN: [id number]  Referring provider: Esa Garcia MD  Dx:   Encounter Diagnosis     ICD-10-CM    1  Nontraumatic complete tear of left rotator cuff  M75 122                   Subjective: Compliant with HEP, no questions regarding POC, motivated to continue PT        Objective: See treatment diary below      Assessment: Tolerated treatment well with exercise parameters/resistance held stable today as noted below as pt is appropriately challenged  Continues to tolerate strengthening TE's with some min difficulty and AROM of External rotation with overhead mobility  remains most limited deficit  Patient demonstrated fatigue post treatment and would benefit from continued PT      Plan: Progress treatment as tolerated     increase therex parameters as tolerated     Precautions: Hx of Meniere's disease, GERD, Thoracic aortic aneurysm       Manuals 1/27 2/1 2/4 2/8 2/10 2/18 2/21 2/24,      Left shoulder PROM  NS                                                  Neuro Re-Ed             Prone T's HEP 2x10 3" 3x10 3" 2lb DB  2x10 3" 2lb DB 2x10 3" 2lb DB 2x10 3" 3lb 2x5 3" 4lb     Prone I's  2x10 3" 3x10 3" 2lb DB  2x10 3" 2lb DB 2x10 3" 2lb DB 2x10 3" 3lb 2x5 3" 4lb     Prone Y's  2x10 3" 3x10 3" 2lb DB  2x10 3" 2lb DB 2x10 3" 2lb DB 2x10 3" 3lb 2x5 3" 4lb     Prone Row     2x10 3" 2lb DB 2x10 3" 2lb DB 2x10 3" 3lb 2x5 3" 4lb     Scap circles on wall w/towel             Pulleys       2 min                   Standing gatch             Ther Ex        3x10 4lb     UBE  6 min 6 min L1 5 3'/3' L2 3'/3'  L2 3'/3' L4 3'/3' L5 3'/3' L 6     TB H abd      3x10 OTB 3x10 OTB      TB extension  3x10 GTB 3x10 BTB 3x10 PTB PTB 3x10 PTB 3x12 PTB 3x12      TB rows  3x10 GTB 3x10 BTB CC 45lb 3x10 CC 45lb 3x10 CC 45lb 3x12 CC 55lb 3x8 CC 55lb 3x8     Standing flex  2x10 1lb DB 3x10 2lb 3x10 3lb 3x10 3lb 3x12 3lb 3x12 3lb 3x6 4lb     Standing scaption 2x10 1lb DB 3x10 2lb 3x10 3lb 3x10 3lb 3x12 3lb 3x12 3lb 3x6 4lb     Standing abd  2x10 1lb DB 3x10 2lb 3x10 3lb 3x10 3lb 3x12 3lb 3x12 3lb 3x6 4lb     Sidelying ER HEP 2x10 1lb DB 3x10 2lb   3x10 Peach TB no monies 3x10 Peach TB no monies 3x10 peach TB no monies     Ther Activity                                       Gait Training                                       Modalities

## 2022-03-03 ENCOUNTER — OFFICE VISIT (OUTPATIENT)
Dept: PHYSICAL THERAPY | Facility: CLINIC | Age: 67
End: 2022-03-03
Payer: COMMERCIAL

## 2022-03-03 DIAGNOSIS — M75.122 NONTRAUMATIC COMPLETE TEAR OF LEFT ROTATOR CUFF: Primary | ICD-10-CM

## 2022-03-03 PROCEDURE — 97112 NEUROMUSCULAR REEDUCATION: CPT | Performed by: PHYSICAL THERAPIST

## 2022-03-03 PROCEDURE — 97110 THERAPEUTIC EXERCISES: CPT | Performed by: PHYSICAL THERAPIST

## 2022-03-03 NOTE — PROGRESS NOTES
Daily Note     Today's date: 3/3/2022  Patient name: Leilani Spencer  : 1955  MRN: [id number]  Referring provider: Abbey Emanuel MD  Dx:   Encounter Diagnosis     ICD-10-CM    1  Nontraumatic complete tear of left rotator cuff  M75 122                   Subjective: mod soreness from "overdoing it" since last visit (at work gym)      Objective: See treatment diary below  FOTO score assessed today, improved from 59 to 66      Assessment: Tolerated treatment well demonstrating increased FOTO score, indicating functional improvement  Patient demonstrated fatigue post treatment and would benefit from continued PT      Plan: Progress treatment as tolerated     Perform RE in subsequent visits      Precautions: Hx of Meniere's disease, GERD, Thoracic aortic aneurysm       Manuals 3/3            Left shoulder PROM                                                    Neuro Re-Ed             Prone T's             Prone I's             Prone Y's             Scap circles on wall w/towel             DB curl + Press ecc 4lb x20                                      Ther Ex             UBE 4'/ 4' L4            TB ER 3x10 OTB            TB extension PTB 3x10            TB rows CC 55lb 3x10                         TB H-Abd 3x10 GTB                                      Ther Activity                                       Gait Training                                       Modalities

## 2022-03-07 ENCOUNTER — OFFICE VISIT (OUTPATIENT)
Dept: PHYSICAL THERAPY | Facility: CLINIC | Age: 67
End: 2022-03-07
Payer: COMMERCIAL

## 2022-03-07 DIAGNOSIS — M75.122 NONTRAUMATIC COMPLETE TEAR OF LEFT ROTATOR CUFF: Primary | ICD-10-CM

## 2022-03-07 PROCEDURE — 97112 NEUROMUSCULAR REEDUCATION: CPT | Performed by: PHYSICAL THERAPIST

## 2022-03-07 PROCEDURE — 97110 THERAPEUTIC EXERCISES: CPT | Performed by: PHYSICAL THERAPIST

## 2022-03-07 NOTE — PROGRESS NOTES
Daily Note     Today's date: 3/7/2022  Patient name: Leilani Spencer  : 1955  MRN: [id number]  Referring provider: Abbey Emanuel MD  Dx:   Encounter Diagnosis     ICD-10-CM    1  Nontraumatic complete tear of left rotator cuff  M75 122        Start Time: 1211  Stop Time: 1234  Total time in clinic (min): 23 minutes    Subjective: Compliant with HEP, no questions regarding POC, motivated to continue PT      Objective: See treatment diary below  Assessment: Tolerated treatment well  Patient exhibited good technique with therapeutic exercises      Plan: Progress treatment as tolerated     Perform RE NV in anticipation of DC to HEP     Precautions: Hx of Meniere's disease, GERD, Thoracic aortic aneurysm       Manuals 3/3            Left shoulder PROM                                                    Neuro Re-Ed             Prone T's             Prone I's             Prone Y's             Scap circles on wall w/towel             DB curl + Press ecc 4lb x20                                      Ther Ex             UBE 4'/ 4' L4            TB ER 3x10 OTB            TB extension PTB 3x10            TB rows CC 55lb 3x10                         TB H-Abd 3x10 GTB                                      Ther Activity                                       Gait Training                                       Modalities

## 2022-03-10 ENCOUNTER — OFFICE VISIT (OUTPATIENT)
Dept: PHYSICAL THERAPY | Facility: CLINIC | Age: 67
End: 2022-03-10
Payer: COMMERCIAL

## 2022-03-10 DIAGNOSIS — M75.122 NONTRAUMATIC COMPLETE TEAR OF LEFT ROTATOR CUFF: Primary | ICD-10-CM

## 2022-03-10 PROCEDURE — 97164 PT RE-EVAL EST PLAN CARE: CPT | Performed by: PHYSICAL THERAPIST

## 2022-03-10 NOTE — PROGRESS NOTES
Assessment  Assessment details: Duran Mcdowell  is a 77 y o  male who has been see in outpatient PT for left shoulder pain, decreased strength, and decreased ROM  Due to these impairments, patient has difficulty performing a/iadls, recreational activities and work-related activities  Patient's clinical presentation is consistent with their referring diagnosis of nontraumatic complete tear of left rotator cuff  Re-evaluation was performed today to assess if he would benefit from skilled physical therapy to address their aforementioned impairments, improve their level of function and to improve their overall quality of life  Impairments: abnormal or restricted ROM, impaired physical strength and pain with function  Understanding of Dx/Px/POC: good   Prognosis: good    Goals           Re-evaluation 3/10/22  Short term goals - to be achieved in 4 weeks:    Decrease pain 20-50%  GOAL MET   Increase strength by 1/2 grade  GOAL MET   Improve range of motion by 25%  Good progress          Long term goals - to be achieved by discharge:    Overhead reaching is improved to maximal level of function  GOAL MET   Lifting is improved to maximal level of function  GOAL MET   IADL performance in related activities is improved to maximal level of function  GOAL MET   Performance in related work activities is improved to maximal level of function  GOAL MET     Plan  Patient will be DC'd from skilled physical therapy today           Pain  Current pain ratin  At best pain ratin  At worst pain ratin      0/10    Patient Goals  Patient goals for therapy: increased strength and increased motion          Objective     Active Range of Motion   Left Shoulder   Flexion: 160 degrees      165 degrees  Abduction: 160 degrees     165 degrees  External rotation BTH: T2       Internal rotation BTB: T8         Passive Range of Motion   Left Shoulder   Flexion: 165 degrees      175 degrees  Abduction: 165 degrees     175 degrees  External rotation 90°: 70 degrees    70 degrees  Internal rotation 90°: 55 degrees    90 degrees    Strength/Myotome Testing     Left Shoulder     Planes of Motion   Flexion: 4- (pain)      5/5 painfree  Abduction: 3+ (pain)      5/5 painfree  External rotation at 0°: 3-     4-/5 painfree  Internal rotation at 0°: 4+     5/5 painfree    Isolated Muscles   Lower trapezius: 4-      5/5/ painfree  Middle trapezius: 4-      5/5 painfree

## 2022-03-11 ENCOUNTER — OFFICE VISIT (OUTPATIENT)
Dept: OBGYN CLINIC | Facility: CLINIC | Age: 67
End: 2022-03-11
Payer: COMMERCIAL

## 2022-03-11 VITALS
DIASTOLIC BLOOD PRESSURE: 74 MMHG | WEIGHT: 221 LBS | BODY MASS INDEX: 34.69 KG/M2 | SYSTOLIC BLOOD PRESSURE: 120 MMHG | HEIGHT: 67 IN

## 2022-03-11 DIAGNOSIS — M75.122 NONTRAUMATIC COMPLETE TEAR OF LEFT ROTATOR CUFF: Primary | ICD-10-CM

## 2022-03-11 PROCEDURE — 99213 OFFICE O/P EST LOW 20 MIN: CPT | Performed by: ORTHOPAEDIC SURGERY

## 2022-03-11 NOTE — PROGRESS NOTES
Assessment:     1  Nontraumatic complete tear of left rotator cuff        Plan:  The patient was seen and examined by Dr Vimla Choi and myself  Problem List Items Addressed This Visit        Musculoskeletal and Integument    Nontraumatic complete tear of left rotator cuff - Primary     Findings consistent with left shoulder chronic rotator cuff tear  Findings and treatment options were discussed with the patient  Symptoms have improved significantly with formal physical therapy  Discussed importance of continuing the exercises at home on a regular basis  Patient has joined the gym program at physical therapy to keep doing the exercises  Avoid repetitive activities with that arm  Follow-up as needed if symptoms worsen  All questions were answered to patient's satisfaction  Subjective:     Patient ID: Alecia Segura  is a 77 y o  male  Chief Complaint:  77 yr old white male following up for a left chronic rotator cuff tear  He has been attending formal physical therapy since last visit  He feels significant improvement in range of motion and pain since last visit  He only feels some discomfort if he reaches out to the side  Strength is improving as well      Allergy:  Allergies   Allergen Reactions    Moxifloxacin Shortness Of Breath and Hives    Alcohol - Food Allergy      vertigo    Caffeine - Food Allergy      vertigo    Cephalosporins      hyperventilation    Doxycycline     Penicillins Hives    Pravastatin      Other reaction(s): Respiratory Distress    Prednisone Tinnitus     Other reaction(s): Unknown    Tamiflu [Oseltamivir] Lightheadedness     Hyperventilation     Medications:  all current active meds have been reviewed  Past Medical History:  Past Medical History:   Diagnosis Date    GERD (gastroesophageal reflux disease)     Hyperlipidemia     Kidney stone     Meniere disease     Pulmonary embolism (HCC)      Past Surgical History:  Past Surgical History:   Procedure Laterality Date    ABDOMINAL SURGERY      BACK SURGERY      COCHLEAR IMPLANT Right     FL INJECTION LEFT SHOULDER (ARTHROGRAM)  1/19/2022    HERNIA REPAIR      IVC FILTER INSERTION       Family History:  Family History   Problem Relation Age of Onset    Hypertension Mother     Hyperlipidemia Mother     Hypertension Father     Hyperlipidemia Father      Social History:  Social History     Substance and Sexual Activity   Alcohol Use No     Social History     Substance and Sexual Activity   Drug Use No     Social History     Tobacco Use   Smoking Status Never Smoker   Smokeless Tobacco Never Used     Review of Systems   Constitutional: Negative  HENT: Negative  Eyes: Negative  Respiratory: Negative  Cardiovascular: Negative  Gastrointestinal: Negative  Endocrine: Negative  Genitourinary: Negative  Musculoskeletal: Negative for arthralgias (Left shoulder) and neck pain  Skin: Negative  Allergic/Immunologic: Negative  Neurological: Negative  Hematological: Negative  Psychiatric/Behavioral: Negative  Objective:  BP Readings from Last 1 Encounters:   03/11/22 120/74      Wt Readings from Last 1 Encounters:   03/11/22 100 kg (221 lb)      BMI:   Estimated body mass index is 34 61 kg/m² as calculated from the following:    Height as of this encounter: 5' 7" (1 702 m)  Weight as of this encounter: 100 kg (221 lb)  BSA:   Estimated body surface area is 2 11 meters squared as calculated from the following:    Height as of this encounter: 5' 7" (1 702 m)  Weight as of this encounter: 100 kg (221 lb)  Physical Exam  Vitals and nursing note reviewed  Constitutional:       Appearance: Normal appearance  He is well-developed  HENT:      Head: Normocephalic and atraumatic  Right Ear: External ear normal       Left Ear: External ear normal    Eyes:      Extraocular Movements: Extraocular movements intact        Conjunctiva/sclera: Conjunctivae normal  Pulmonary:      Effort: Pulmonary effort is normal    Musculoskeletal:      Cervical back: Neck supple  Skin:     General: Skin is warm  Neurological:      Mental Status: He is alert and oriented to person, place, and time  Psychiatric:         Mood and Affect: Mood normal          Behavior: Behavior normal        Left Shoulder Exam     Tenderness   The patient is experiencing no tenderness  Range of Motion   Active abduction: normal   Passive abduction: normal   Forward flexion: normal   Internal rotation 0 degrees: normal     Muscle Strength   Abduction: 5/5   Internal rotation: 5/5   External rotation: 4/5   Biceps: 5/5     Tests   Drop arm: negative    Other   Erythema: absent  Sensation: normal  Pulse: present             No new imaging

## 2022-03-11 NOTE — ASSESSMENT & PLAN NOTE
Findings consistent with left shoulder chronic rotator cuff tear  Findings and treatment options were discussed with the patient  Symptoms have improved significantly with formal physical therapy  Discussed importance of continuing the exercises at home on a regular basis  Patient has joined the gym program at physical therapy to keep doing the exercises  Avoid repetitive activities with that arm  Follow-up as needed if symptoms worsen  All questions were answered to patient's satisfaction

## 2022-04-08 DIAGNOSIS — I10 HYPERTENSION, UNSPECIFIED TYPE: ICD-10-CM

## 2022-04-08 RX ORDER — CHLORTHALIDONE 25 MG/1
25 TABLET ORAL DAILY
Qty: 30 TABLET | Refills: 0 | Status: SHIPPED | OUTPATIENT
Start: 2022-04-08 | End: 2022-05-04

## 2022-05-04 DIAGNOSIS — I10 HYPERTENSION, UNSPECIFIED TYPE: ICD-10-CM

## 2022-05-04 RX ORDER — CHLORTHALIDONE 25 MG/1
25 TABLET ORAL DAILY
Qty: 30 TABLET | Refills: 0 | Status: SHIPPED | OUTPATIENT
Start: 2022-05-04 | End: 2022-06-03

## 2022-06-02 DIAGNOSIS — I10 HYPERTENSION, UNSPECIFIED TYPE: ICD-10-CM

## 2022-06-03 RX ORDER — CHLORTHALIDONE 25 MG/1
25 TABLET ORAL DAILY
Qty: 90 TABLET | Refills: 1 | Status: SHIPPED | OUTPATIENT
Start: 2022-06-03

## 2022-06-10 NOTE — PROGRESS NOTES
Cardiology Follow Up    [facility]   1955  [id number]  [facility]  [facility]  [address]  [phone]    1  Essential (primary) hypertension  Echo complete w/ contrast if indicated    POCT ECG    Holter monitor   2  Pure hypercholesterolemia  Holter monitor   3  Mild ascending aorta dilatation (HCC)  Echo complete w/ contrast if indicated    Holter monitor   4  Palpitations  POCT ECG    Holter monitor       Interval History: Patient is here for a follow-up visit  [facility] has a mild descending thoracic aortic aneurysm most recently seen on CT scan performed December 2018   It was 4 3 x 3 9 cm  Echocardiogram done 6/2021 demonstrated preserved LV systolic function with mild dilatation of the ascending aorta at 4 1 cm  There was no significant valvular heart disease   He had IVC filter placement in reference to prior veno-thromboembolism  ETT January 2019  demonstrated no evidence of provokable ischemia   His LVEF was 62%   Patient admits to statin intolerance in reference to muscle and memory issues   He did not even tolerate pravastatin 10 mg per day  A lipid profile done October 2020 demonstrated total cholesterol of 166 with an HDL of 45 and a calculated LDL of 96  This was improved compared to a prior study  Patient is on Zetia  EKG today demonstrates sinus rhythm and is a normal tracing with no significant change compared to a prior tracing done May 12, 2021  Patient has had no stool cardiac symptoms  His vital signs are stable  His EKG today demonstrates sinus rhythm and is a normal tracing  He has been off his Zetia for two months but is willing to restart it  Patient has had issues with intermittent palpitation  Will check a Holter monitor  He does not drink alcohol or caffeinated beverages      Patient Active Problem List   Diagnosis    Pes anserinus bursitis of left knee    Bursitis of left knee    Shoulder impingement syndrome, right    Nontraumatic complete tear of left rotator cuff     Past Medical History:   Diagnosis Date    GERD (gastroesophageal reflux disease)     Hyperlipidemia     Kidney stone     Meniere disease     Pulmonary embolism (HCC)      Social History     Socioeconomic History    Marital status:      Spouse name: Not on file    Number of children: Not on file    Years of education: Not on file    Highest education level: Not on file   Occupational History    Not on file   Tobacco Use    Smoking status: Never Smoker    Smokeless tobacco: Never Used   Substance and Sexual Activity    Alcohol use: No    Drug use: No    Sexual activity: Not on file   Other Topics Concern    Not on file   Social History Narrative    Not on file     Social Determinants of Health     Financial Resource Strain: Not on file   Food Insecurity: Not on file   Transportation Needs: Not on file   Physical Activity: Not on file   Stress: Not on file   Social Connections: Not on file   Intimate Partner Violence: Not on file   Housing Stability: Not on file      Family History   Problem Relation Age of Onset    Hypertension Mother     Hyperlipidemia Mother     Hypertension Father     Hyperlipidemia Father      Past Surgical History:   Procedure Laterality Date    ABDOMINAL SURGERY      BACK SURGERY      COCHLEAR IMPLANT Right     FL INJECTION LEFT SHOULDER (ARTHROGRAM)  1/19/2022    HERNIA REPAIR      IVC FILTER INSERTION         Current Outpatient Medications:     chlorthalidone 25 mg tablet, TAKE 1 TABLET (25 MG TOTAL) BY MOUTH DAILY  , Disp: 90 tablet, Rfl: 1    Klor-Con M20 20 MEQ tablet, TAKE 1 TABLET (20 MEQ TOTAL) BY MOUTH 2 (TWO) TIMES A DAY, Disp: 180 tablet, Rfl: 3    Nutritional Supplements (THERALITH XR) TABS, Take by mouth 2 (two) times a day 2 tablets BID, Disp: , Rfl:     omeprazole (PriLOSEC) 20 mg delayed release capsule, Take 20 mg by mouth daily, Disp: , Rfl:     ezetimibe (ZETIA) 10 mg tablet, Take 10 mg by mouth every other day  (Patient not taking: Reported on 6/20/2022), Disp: , Rfl:   Allergies   Allergen Reactions    Moxifloxacin Shortness Of Breath and Hives    Alcohol - Food Allergy      vertigo    Caffeine - Food Allergy      vertigo    Cephalosporins      hyperventilation    Doxycycline     Penicillins Hives    Pravastatin      Other reaction(s): Respiratory Distress    Prednisone Tinnitus     Other reaction(s): Unknown    Tamiflu [Oseltamivir] Lightheadedness     Hyperventilation       Labs:not applicable  Imaging: No results found  Review of Systems:  Review of Systems   Cardiovascular: Positive for palpitations  All other systems reviewed and are negative  Physical Exam:  /76 (BP Location: Left arm, Patient Position: Sitting, Cuff Size: Standard)   Pulse 77   Ht 5' 7" (1 702 m)   Wt 89 8 kg (198 lb)   BMI 31 01 kg/m²   Physical Exam  Vitals reviewed  Constitutional:       Appearance: He is well-developed  HENT:      Head: Normocephalic and atraumatic  Eyes:      Conjunctiva/sclera: Conjunctivae normal       Pupils: Pupils are equal, round, and reactive to light  Cardiovascular:      Rate and Rhythm: Normal rate  Heart sounds: Normal heart sounds  Pulmonary:      Effort: Pulmonary effort is normal       Breath sounds: Normal breath sounds  Musculoskeletal:      Cervical back: Normal range of motion and neck supple  Skin:     General: Skin is warm and dry  Neurological:      Mental Status: He is alert and oriented to person, place, and time  Discussion/Summary:  In reference intermittent palpitation will check a 48 hour Holter monitor  He is due for an echocardiogram to assess LV wall thickness and systolic function and valvular structure and function  We will also check the diameter of his ascending aorta and aortic root    I have asked him to call if there is a problem in the interim otherwise I will see him in follow-up in one years time in sooner as is necessary

## 2022-06-20 ENCOUNTER — OFFICE VISIT (OUTPATIENT)
Dept: CARDIOLOGY CLINIC | Facility: CLINIC | Age: 67
End: 2022-06-20
Payer: COMMERCIAL

## 2022-06-20 VITALS
HEART RATE: 77 BPM | SYSTOLIC BLOOD PRESSURE: 136 MMHG | WEIGHT: 198 LBS | HEIGHT: 67 IN | DIASTOLIC BLOOD PRESSURE: 76 MMHG | BODY MASS INDEX: 31.08 KG/M2

## 2022-06-20 DIAGNOSIS — I77.810 MILD ASCENDING AORTA DILATATION (HCC): ICD-10-CM

## 2022-06-20 DIAGNOSIS — R00.2 PALPITATIONS: ICD-10-CM

## 2022-06-20 DIAGNOSIS — E78.00 PURE HYPERCHOLESTEROLEMIA: ICD-10-CM

## 2022-06-20 DIAGNOSIS — I10 ESSENTIAL (PRIMARY) HYPERTENSION: Primary | ICD-10-CM

## 2022-06-20 PROCEDURE — 99214 OFFICE O/P EST MOD 30 MIN: CPT | Performed by: INTERNAL MEDICINE

## 2022-06-20 PROCEDURE — 93000 ELECTROCARDIOGRAM COMPLETE: CPT | Performed by: INTERNAL MEDICINE

## 2022-07-01 ENCOUNTER — HOSPITAL ENCOUNTER (OUTPATIENT)
Dept: NON INVASIVE DIAGNOSTICS | Age: 67
Discharge: HOME | End: 2022-07-01
Payer: COMMERCIAL

## 2022-07-01 ENCOUNTER — APPOINTMENT (OUTPATIENT)
Dept: NON INVASIVE DIAGNOSTICS | Age: 67
End: 2022-07-01
Payer: COMMERCIAL

## 2022-07-01 VITALS
HEIGHT: 67 IN | BODY MASS INDEX: 31.08 KG/M2 | SYSTOLIC BLOOD PRESSURE: 136 MMHG | DIASTOLIC BLOOD PRESSURE: 78 MMHG | HEART RATE: 66 BPM | WEIGHT: 198 LBS

## 2022-07-01 DIAGNOSIS — I10 ESSENTIAL (PRIMARY) HYPERTENSION: ICD-10-CM

## 2022-07-01 DIAGNOSIS — I77.810 MILD ASCENDING AORTA DILATATION (HCC): ICD-10-CM

## 2022-07-01 LAB
AORTIC ROOT: 3.1 CM
APICAL FOUR CHAMBER EJECTION FRACTION: 60 %
ASCENDING AORTA: 4 CM
DOP CALC LVOT AREA: 3.46 CM2
DOP CALC LVOT DIAMETER: 2.1 CM
E WAVE DECELERATION TIME: 258 MS
FRACTIONAL SHORTENING: 36 % (ref 28–44)
INTERVENTRICULAR SEPTUM IN DIASTOLE (PARASTERNAL SHORT AXIS VIEW): 1.2 CM
INTERVENTRICULAR SEPTUM: 1.2 CM (ref 0.6–1.1)
LEFT ATRIUM AREA SYSTOLE SINGLE PLANE A4C: 15.1 CM2
LEFT ATRIUM SIZE: 3.6 CM
LEFT INTERNAL DIMENSION IN SYSTOLE: 2.3 CM (ref 2.1–4)
LEFT VENTRICULAR INTERNAL DIMENSION IN DIASTOLE: 3.6 CM (ref 3.5–6)
LEFT VENTRICULAR POSTERIOR WALL IN END DIASTOLE: 1.2 CM
LEFT VENTRICULAR STROKE VOLUME: 35 ML
LVSV (TEICH): 35 ML
MV E'TISSUE VEL-SEP: 7 CM/S
MV PEAK A VEL: 0.66 M/S
MV PEAK E VEL: 54 CM/S
MV STENOSIS PRESSURE HALF TIME: 75 MS
MV VALVE AREA P 1/2 METHOD: 2.93 CM2
RA PRESSURE ESTIMATED: 10 MMHG
RIGHT ATRIUM AREA SYSTOLE A4C: 16.2 CM2
RIGHT VENTRICLE ID DIMENSION: 3.9 CM
RV PSP: 36 MMHG
SL CV LV EF: 60
SL CV PED ECHO LEFT VENTRICLE DIASTOLIC VOLUME (MOD BIPLANE) 2D: 53 ML
SL CV PED ECHO LEFT VENTRICLE SYSTOLIC VOLUME (MOD BIPLANE) 2D: 18 ML
TR MAX PG: 26 MMHG
TR PEAK VELOCITY: 2.6 M/S
TRICUSPID ANNULAR PLANE SYSTOLIC EXCURSION: 2.8 CM
TRICUSPID VALVE PEAK REGURGITATION VELOCITY: 2.56 M/S

## 2022-07-01 PROCEDURE — 93306 TTE W/DOPPLER COMPLETE: CPT | Performed by: INTERNAL MEDICINE

## 2022-07-01 PROCEDURE — 93306 TTE W/DOPPLER COMPLETE: CPT

## 2022-07-06 ENCOUNTER — HOSPITAL ENCOUNTER (OUTPATIENT)
Dept: NON INVASIVE DIAGNOSTICS | Age: 67
Discharge: HOME | End: 2022-07-06
Payer: COMMERCIAL

## 2022-07-06 DIAGNOSIS — E78.00 PURE HYPERCHOLESTEROLEMIA: ICD-10-CM

## 2022-07-06 DIAGNOSIS — I77.810 MILD ASCENDING AORTA DILATATION (HCC): ICD-10-CM

## 2022-07-06 DIAGNOSIS — I10 ESSENTIAL (PRIMARY) HYPERTENSION: ICD-10-CM

## 2022-07-06 DIAGNOSIS — R00.2 PALPITATIONS: ICD-10-CM

## 2022-07-06 PROCEDURE — 93226 XTRNL ECG REC<48 HR SCAN A/R: CPT

## 2022-07-06 PROCEDURE — 93225 XTRNL ECG REC<48 HRS REC: CPT

## 2022-07-14 PROCEDURE — 93227 XTRNL ECG REC<48 HR R&I: CPT | Performed by: INTERNAL MEDICINE

## 2022-10-19 ENCOUNTER — TELEPHONE (OUTPATIENT)
Dept: GASTROENTEROLOGY | Facility: CLINIC | Age: 67
End: 2022-10-19

## 2022-10-19 ENCOUNTER — CONSULT (OUTPATIENT)
Dept: GASTROENTEROLOGY | Facility: CLINIC | Age: 67
End: 2022-10-19
Payer: COMMERCIAL

## 2022-10-19 VITALS
WEIGHT: 223 LBS | DIASTOLIC BLOOD PRESSURE: 78 MMHG | BODY MASS INDEX: 35 KG/M2 | SYSTOLIC BLOOD PRESSURE: 148 MMHG | HEIGHT: 67 IN

## 2022-10-19 DIAGNOSIS — K59.09 OTHER CONSTIPATION: ICD-10-CM

## 2022-10-19 DIAGNOSIS — Z86.010 HISTORY OF COLONIC POLYPS: Primary | ICD-10-CM

## 2022-10-19 DIAGNOSIS — K64.8 OTHER HEMORRHOIDS: ICD-10-CM

## 2022-10-19 DIAGNOSIS — K21.9 GASTROESOPHAGEAL REFLUX DISEASE, UNSPECIFIED WHETHER ESOPHAGITIS PRESENT: ICD-10-CM

## 2022-10-19 PROCEDURE — 99204 OFFICE O/P NEW MOD 45 MIN: CPT | Performed by: INTERNAL MEDICINE

## 2022-10-19 RX ORDER — MULTIVITAMIN
1 TABLET ORAL DAILY
COMMUNITY

## 2022-10-19 NOTE — PATIENT INSTRUCTIONS
Fiber Supplementation Instructions     Insufficient fiber in the diet can lead to constipation which may result in the development of hemorrhoids and/or anal fissures  Increased fiber intake has been shown to reduce constipation by softening the stool and increasing the regularity of bowel movements  Soluble fiber (such as Benefiber®) is easy to incorporate into your diet as it is tasteless and can be mixed with food or drink  Begin with 1 tbsp once a day and then gradually increase to 1 tbsp 3 times a day over the period of a few days  Stir Benefiber® into 4-8 ounces of liquid (carbonated beverages are not recommended) or mix with soft food (hot or cold)  Stir well until dissolved  Increase your fluid intake as necessary to ensure you are drinking 7-8 glasses of water every day  Supplemental fiber should be taken with meals for greatest benefit   Many less-expensive generic versions of Benefiber® are available with similar active components (pictured below)

## 2022-10-19 NOTE — TELEPHONE ENCOUNTER
Scheduled date of colonoscopy (as of today):   12/9/2022  Physician performing colonoscopy: Dr Delores Buchanan  Location of colonoscopy:  [facility]  Bowel prep reviewed with patient: Clenpiq  Instructions reviewed with patient by:PD  Clearances: NONE

## 2022-10-19 NOTE — PROGRESS NOTES
[facility] - Outpatient Consultation  Saira Cain  79 y o  male MRN: [id number]  Encounter: [id number]    ASSESSMENT AND PLAN:      1  History of colonic polyps  Patient with a history of colon polyps  Last colonoscopy was height 5 years ago  Recall due now  Will schedule patient for colonoscopy for history of colon polyps  Will also evaluate internal hemorrhoids at that time  - Colonoscopy; Future  - Sod Picosulfate-Mag Ox-Cit Acd 10-3 5-12 MG-GM -GM/160ML SOLN; Take 160 mL by mouth once for 1 dose Take 160 mL by mouth once for 1 dose  Dispense: 160 mL; Refill: 0    2  Other constipation  History of occasional constipation  We will start patient on fiber supplementation  Also states potential motility issues of the in the rectum  If continues to have issues with straining or inability to push out bowel movements can consider anorectal manometry at that time  3  Other hemorrhoids  History of hemorrhoidal banding in the past x3  Now with external hemorrhoid on physical exam   Will start fiber supplementation and conservative management of hemorrhoids  4  Gastroesophageal reflux disease, unspecified whether esophagitis present  Patient with a history of GERD and intermittent reflux symptoms  Continue conservative therapy  P r n  medication at this time  History of EGD in 2021 else unremarkable  Will get records of this  History of single episode of dysphagia in the past but resolved and currently not having symptoms  History of Schatzki's ring in the past   We will evaluate with EGD if symptoms reoccur and based on findings of previous EGD        Follow up Appointment:  3 months    Chief Complaint   Patient presents with   • Hemorrhoids       HPI:   45-year-old male past medical history of hypertension, GERD, PE sp IVC filter,  presenting for a consultation from PCP regarding hemorrhoids    Patient states that a few months ago he had a large hemorrhoid that he feel needs external anus  Patient states that it has been improved greatly over that time  Patient states that it was worsening when he was straining to have bowel movements  He states that he has a history internal hemorrhoids in the past requiring 3 sessions of banding  No complications  Currently not on fiber therapy  Patient states that he feels as if he has a tight anal sphincter and does not feel much force behind his bowel movements  He noticed 1 episode of blood on the toilet paper  He does have a history of reflux and he is on Prilosec 20 mg as needed  Patient states that he may have symptoms of reflux about once a month  Did have a history of 1 episode of bread getting stuck in the middle of his chest but ultimately going down after ingestion of fluids  History of Schatzki's ring in the past with EGD  Recent EGD about a year ago at home a GI it with unremarkable findings  He states he had a colonoscopy 5 years ago where he had polyps removed  GI History:  Blood thinners: Denies ASA, antiplatelet, or anticoagulation  NSAID use: Denies  Insulin use: None    Abdominal Surgical Hx: None  Family Hx: Denies first degree relatives with GI malignancies  GI procedure Hx: EGD - schatzki ring in the past  Last year EGD  Colonoscopy has been about 5 years           Historical Information   Past Medical History:   Diagnosis Date   • GERD (gastroesophageal reflux disease)    • Hyperlipidemia    • Kidney stone    • Meniere disease    • Pulmonary embolism (HCC)      Past Surgical History:   Procedure Laterality Date   • ABDOMINAL SURGERY     • BACK SURGERY     • COCHLEAR IMPLANT Right    • FL INJECTION LEFT SHOULDER (ARTHROGRAM)  1/19/2022   • HERNIA REPAIR     • IVC FILTER INSERTION       Social History     Substance and Sexual Activity   Alcohol Use No     Social History     Substance and Sexual Activity   Drug Use No     Social History     Tobacco Use   Smoking Status Never Smoker   Smokeless Tobacco Never Used     Family History   Problem Relation Age of Onset   • Hypertension Mother    • Hyperlipidemia Mother    • Hypertension Father    • Hyperlipidemia Father        Meds/Allergies     Current Outpatient Medications:   •  chlorthalidone 25 mg tablet  •  Klor-Con M20 20 MEQ tablet  •  Multiple Vitamin (multivitamin) tablet  •  Nutritional Supplements (THERALITH XR) TABS  •  omeprazole (PriLOSEC) 20 mg delayed release capsule  •  Sod Picosulfate-Mag Ox-Cit Acd 10-3 5-12 MG-GM -GM/160ML SOLN  •  ezetimibe (ZETIA) 10 mg tablet    Allergies   Allergen Reactions   • Moxifloxacin Shortness Of Breath and Hives   • Alcohol - Food Allergy      vertigo   • Caffeine - Food Allergy      vertigo   • Cephalosporins      hyperventilation   • Doxycycline    • Penicillins Hives   • Pravastatin      Other reaction(s): Respiratory Distress   • Prednisone Tinnitus     Other reaction(s): Unknown   • Tamiflu [Oseltamivir] Lightheadedness     Hyperventilation       PHYSICAL EXAM:    Blood pressure 148/78, height 5' 7" (1 702 m), weight 101 kg (223 lb)  Body mass index is 34 93 kg/m²  General Appearance: NAD, cooperative, alert  Eyes: Anicteric, EOMI  ENT:  Normocephalic, atraumatic  Neck:  Supple, symmetrical, trachea midline  Resp:  Air entry present bilaterally  CV: S1, S2 present    GI:  Soft, non-tender, non-distended; normal bowel sounds; no masses, no organomegaly   Rectal:  External hemorrhoid noted in the left lateral position  Non thrombosed  Small  No pain on palpation  Musculoskeletal: No cyanosis, clubbing or edema    Skin:  No jaundice, rashes, or lesions   Psych: Normal affect, good eye contact  Neuro: No gross deficits    Lab Results:   Lab Results   Component Value Date    WBC 6 49 12/06/2018    WBC 6 50 10/19/2017    WBC 4 69 02/12/2017    HGB 15 9 12/06/2018    HGB 15 3 07/11/2018    HGB 16 1 10/19/2017    MCV 93 12/06/2018     12/06/2018     10/19/2017     02/12/2017    INR 0 96 10/07/2015     Lab Results   Component Value Date     10/07/2015    K 3 7 06/29/2021     06/29/2021    CO2 28 06/29/2021    ANIONGAP 9 10/07/2015    BUN 19 06/29/2021    CREATININE 1 13 06/29/2021    GLUCOSE 115 07/11/2018    CALCIUM 8 9 12/06/2018    AST 27 10/30/2020    AST 28 12/06/2018    AST 25 10/19/2017    ALT 34 10/30/2020    ALT 49 12/06/2018    ALT 37 10/19/2017    ALKPHOS 97 12/06/2018    ALKPHOS 108 10/19/2017    ALKPHOS 89 05/27/2016    PROT 7 3 10/07/2015    BILITOT 0 67 10/07/2015    EGFR 71 12/06/2018     No results found for: IRON, TIBC, FERRITIN  Lab Results   Component Value Date    LIPASE 122 12/06/2018       Radiology Results:   No results found

## 2022-11-12 DIAGNOSIS — E87.6 HYPOKALEMIA: ICD-10-CM

## 2022-11-14 RX ORDER — POTASSIUM CHLORIDE 1500 MG/1
TABLET, EXTENDED RELEASE ORAL
Qty: 180 TABLET | Refills: 1 | Status: SHIPPED | OUTPATIENT
Start: 2022-11-14

## 2022-11-21 ENCOUNTER — OFFICE VISIT (OUTPATIENT)
Dept: URGENT CARE | Facility: CLINIC | Age: 67
End: 2022-11-21

## 2022-11-21 VITALS
DIASTOLIC BLOOD PRESSURE: 68 MMHG | SYSTOLIC BLOOD PRESSURE: 148 MMHG | HEART RATE: 81 BPM | HEIGHT: 67 IN | RESPIRATION RATE: 18 BRPM | TEMPERATURE: 98.2 F | WEIGHT: 223 LBS | OXYGEN SATURATION: 97 % | BODY MASS INDEX: 35 KG/M2

## 2022-11-21 DIAGNOSIS — J01.90 ACUTE NON-RECURRENT SINUSITIS, UNSPECIFIED LOCATION: ICD-10-CM

## 2022-11-21 DIAGNOSIS — R50.9 FEVER, UNSPECIFIED FEVER CAUSE: Primary | ICD-10-CM

## 2022-11-21 LAB
SARS-COV-2 AG UPPER RESP QL IA: NEGATIVE
VALID CONTROL: NORMAL

## 2022-11-21 RX ORDER — AZITHROMYCIN 250 MG/1
TABLET, FILM COATED ORAL
Qty: 6 TABLET | Refills: 0 | Status: SHIPPED | OUTPATIENT
Start: 2022-11-21 | End: 2022-11-25

## 2022-11-21 NOTE — PATIENT INSTRUCTIONS
Patient was educated on sinus infection  Patient was educated most sinus infections due not require antibiotics until day 7 -10 of symptoms  Antibiotics were sent to the pharmacy  Patient was told to not use antibiotics until day 7-10 of symptoms  Patient was educated any chest pain or shortness of breath go to ED    Sinusitis   WHAT YOU NEED TO KNOW:   Sinusitis is inflammation or infection of your sinuses  Sinusitis is most often caused by a virus  Acute sinusitis may last up to 12 weeks  Chronic sinusitis lasts longer than 12 weeks  Recurrent sinusitis means you have 4 or more infections in 1 year  DISCHARGE INSTRUCTIONS:   Return to the emergency department if:   You have trouble breathing or wheezing that is getting worse  You have a stiff neck, a fever, or a bad headache  You cannot open your eye  Your eyeball bulges out or you cannot move your eye  You are more sleepy than normal, or you notice changes in your ability to think, move, or talk  You have swelling of your forehead or scalp  Call your doctor if:   You have vision changes, such as double vision  Your eye and eyelid are red, swollen, and painful  Your symptoms do not improve or go away after 10 days  You have nausea and are vomiting  Your nose is bleeding  You have questions or concerns about your condition or care  Medicines: Your symptoms may go away on their own  Your healthcare provider may recommend watchful waiting for up to 10 days before starting antibiotics  You may need any of the following:  Acetaminophen  decreases pain and fever  It is available without a doctor's order  Ask how much to take and how often to take it  Follow directions  Read the labels of all other medicines you are using to see if they also contain acetaminophen, or ask your doctor or pharmacist  Acetaminophen can cause liver damage if not taken correctly   Do not use more than 4 grams (4,000 milligrams) total of acetaminophen in one day  NSAIDs , such as ibuprofen, help decrease swelling, pain, and fever  This medicine is available with or without a doctor's order  NSAIDs can cause stomach bleeding or kidney problems in certain people  If you take blood thinner medicine, always ask your healthcare provider if NSAIDs are safe for you  Always read the medicine label and follow directions  Nasal steroid sprays  may help decrease inflammation in your nose and sinuses  Decongestants  help reduce swelling and drain mucus in the nose and sinuses  They may help you breathe easier  Antihistamines  help dry mucus in the nose and relieve sneezing  Antibiotics  help treat or prevent a bacterial infection  Take your medicine as directed  Contact your healthcare provider if you think your medicine is not helping or if you have side effects  Tell him or her if you are allergic to any medicine  Keep a list of the medicines, vitamins, and herbs you take  Include the amounts, and when and why you take them  Bring the list or the pill bottles to follow-up visits  Carry your medicine list with you in case of an emergency  Self-care:   Rinse your sinuses as directed  Use a sinus rinse device to rinse your nasal passages with a saline (salt water) solution or distilled water  Do not use tap water  This will help thin the mucus in your nose and rinse away pollen and dirt  It will also help reduce swelling so you can breathe normally  Use a humidifier  to increase air moisture in your home  This may make it easier for you to breathe and help decrease your cough  Sleep with your head elevated  Place an extra pillow under your head before you go to sleep to help your sinuses drain  Drink liquids as directed  Ask your healthcare provider how much liquid to drink each day and which liquids are best for you  Liquids will thin the mucus in your nose and help it drain  Avoid drinks that contain alcohol or caffeine  Do not smoke, and avoid secondhand smoke  Nicotine and other chemicals in cigarettes and cigars can make your symptoms worse  Ask your healthcare provider for information if you currently smoke and need help to quit  E-cigarettes or smokeless tobacco still contain nicotine  Talk to your healthcare provider before you use these products  Prevent the spread of germs:   Wash your hands often with soap and water  Wash your hands after you use the bathroom, change a child's diaper, or sneeze  Wash your hands before you prepare or eat food  Stay away from people who are sick  Some germs spread easily and quickly through contact  Follow up with your doctor as directed: You may be referred to an ear, nose, and throat specialist  Write down your questions so you remember to ask them during your visits  © Copyright [employer] 2022 Information is for End User's use only and may not be sold, redistributed or otherwise used for commercial purposes  All illustrations and images included in CareNotes® are the copyrighted property of A D A M , Inc  or [facility] Swathi Esteves   The above information is an  only  It is not intended as medical advice for individual conditions or treatments  Talk to your doctor, nurse or pharmacist before following any medical regimen to see if it is safe and effective for you

## 2022-11-21 NOTE — PROGRESS NOTES
[facility]        NAME: La Hitchcock  is a 79 y o  male  : 1955    MRN: [id number]  DATE: 2022  TIME: 9:52 AM    Assessment and Plan   Fever, unspecified fever cause [R50 9]  1  Fever, unspecified fever cause  Poct Covid 19 Rapid Antigen Test      2  Acute non-recurrent sinusitis, unspecified location  azithromycin (ZITHROMAX) 250 mg tablet      Rapid COVID 19- negative      Patient Instructions     Patient was educated on sinus infection  Patient was educated most sinus infections due not require antibiotics until day 7 -10 of symptoms  Antibiotics were sent to the pharmacy  Patient was told to not use antibiotics until day 7-10 of symptoms  Patient was educated any chest pain or shortness of breath go to ED    Chief Complaint     Chief Complaint   Patient presents with   • Nasal Congestion     PT reports nasal congestion, sinus pain, irritation and and congestion in chest x 2 days  Pt states mild fevers at home of around 99  Took Advil at 7AM this morning  History of Present Illness       Patient is here today complaining of sore throat, congestion, PND and mild cough for 1 5 days  Patient reports no history of asthma or diabetes  Allergies were reviewed in chart  Review of Systems   Review of Systems   Constitutional: Positive for fever  HENT: Positive for congestion, sinus pressure, sinus pain and sore throat  Respiratory: Positive for cough and chest tightness  Psychiatric/Behavioral: Negative  Current Medications       Current Outpatient Medications:   •  azithromycin (ZITHROMAX) 250 mg tablet, Take 2 tablets today then 1 tablet daily x 4 days, Disp: 6 tablet, Rfl: 0  •  chlorthalidone 25 mg tablet, TAKE 1 TABLET (25 MG TOTAL) BY MOUTH DAILY  , Disp: 90 tablet, Rfl: 1  •  Klor-Con M20 20 MEQ tablet, TAKE 1 TABLET (20 MEQ TOTAL) BY MOUTH 2 (TWO) TIMES A DAY, Disp: 180 tablet, Rfl: 1  •  Multiple Vitamin (multivitamin) tablet, Take 1 tablet by mouth daily, Disp: , Rfl:   •  Nutritional Supplements (THERALITH XR) TABS, Take by mouth 2 (two) times a day 2 tablets BID, Disp: , Rfl:   •  omeprazole (PriLOSEC) 20 mg delayed release capsule, Take 20 mg by mouth daily, Disp: , Rfl:   •  ezetimibe (ZETIA) 10 mg tablet, Take 10 mg by mouth every other day  (Patient not taking: Reported on 10/19/2022), Disp: , Rfl:     Current Allergies     Allergies as of 11/21/2022 - Reviewed 11/21/2022   Allergen Reaction Noted   • Moxifloxacin Shortness Of Breath and Hives 10/16/2013   • Alcohol - food allergy  02/08/2017   • Caffeine - food allergy  05/27/2016   • Cephalosporins  05/27/2016   • Doxycycline     • Penicillins Hives 07/30/2015   • Pravastatin     • Prednisone Tinnitus 11/17/2017   • Tamiflu [oseltamivir] Lightheadedness 04/04/2018            The following portions of the patient's history were reviewed and updated as appropriate: allergies, current medications, past family history, past medical history, past social history, past surgical history and problem list      Past Medical History:   Diagnosis Date   • GERD (gastroesophageal reflux disease)    • Hyperlipidemia    • Kidney stone    • Meniere disease    • Pulmonary embolism (HCC)        Past Surgical History:   Procedure Laterality Date   • ABDOMINAL SURGERY     • BACK SURGERY     • COCHLEAR IMPLANT Right    • FL INJECTION LEFT SHOULDER (ARTHROGRAM)  1/19/2022   • HERNIA REPAIR     • IVC FILTER INSERTION         Family History   Problem Relation Age of Onset   • Hypertension Mother    • Hyperlipidemia Mother    • Hypertension Father    • Hyperlipidemia Father          Medications have been verified  Objective   /68 (BP Location: Left arm)   Pulse 81   Temp 98 2 °F (36 8 °C)   Resp 18   Ht 5' 7" (1 702 m)   Wt 101 kg (223 lb)   SpO2 97%   BMI 34 93 kg/m²   No LMP for male patient  Physical Exam     Physical Exam  Vitals and nursing note reviewed     Constitutional:       Appearance: Normal appearance  HENT:      Head: Normocephalic  Comments: Mild pressure over frontal and maxillary sinus     Right Ear: Tympanic membrane, ear canal and external ear normal       Left Ear: Tympanic membrane, ear canal and external ear normal       Nose: Nose normal       Mouth/Throat:      Mouth: Mucous membranes are moist       Pharynx: No oropharyngeal exudate or posterior oropharyngeal erythema  Eyes:      Extraocular Movements: Extraocular movements intact  Pupils: Pupils are equal, round, and reactive to light  Cardiovascular:      Rate and Rhythm: Normal rate and regular rhythm  Heart sounds: Normal heart sounds  Pulmonary:      Breath sounds: Normal breath sounds  No wheezing  Neurological:      General: No focal deficit present  Mental Status: He is alert and oriented to person, place, and time     Psychiatric:         Mood and Affect: Mood normal          Behavior: Behavior normal

## 2022-12-02 NOTE — PROGRESS NOTES
12/2/2022    Patient: Loc Cerda   YOB: 1950   Date of Visit: 12/2/2022     Jose Luis Garibay, [address]  [address] [place] [address] [address] [place]    Dear Jose Luis Garibay MD,      Thank you for referring Ms. Roselyn Yee to [facility] [place] for evaluation. My notes for this consultation are attached. If you have questions, please do not hesitate to call me. I look forward to following your patient along with you.       Sincerely,    Miguel Gaming MD Cardiology Follow Up    Arthur Hankins   1955  [id number]  [address] [facility] [address] [facility] [address] [facility]  [phone]    1  Pure hypercholesterolemia     2  Chest pain, unspecified type     3  Mild ascending aorta dilatation (HCC)         Interval History:  Patient is here for a follow-up visit  He was last seen by me in April  Patient had an echocardiogram in September which demonstrated preserved LV systolic function with no significant valvular heart disease  Patient is on a diuretic for his hypertension  He has a history of statin intolerance  Patient had been seen in the emergency room in July in reference to an injury and at that time had a CT scan of the chest and abdomen  The chest CT demonstrated a mild dilatation of the ascending aorta at 4 1 cm  We will keep an eye on this with echocardiography  He has had no chest pain or significant dyspnea  There is no problem list on file for this patient  Past Medical History:   Diagnosis Date    GERD (gastroesophageal reflux disease)     Hyperlipidemia     Kidney stone     Meniere disease     Pulmonary embolism (HCC)      Social History     Social History    Marital status:      Spouse name: N/A    Number of children: N/A    Years of education: N/A     Occupational History    Not on file  Social History Main Topics    Smoking status: Never Smoker    Smokeless tobacco: Never Used    Alcohol use No    Drug use: No    Sexual activity: Not on file     Other Topics Concern    Not on file     Social History Narrative    No narrative on file      No family history on file    Past Surgical History:   Procedure Laterality Date    ABDOMINAL SURGERY      BACK SURGERY      COCHLEAR IMPLANT Right     HERNIA REPAIR      IVC FILTER INSERTION         Current Outpatient Prescriptions:     chlorthalidone 25 mg tablet, Take 25 mg by mouth daily, Disp: , Rfl:     ezetimibe (ZETIA) 10 mg tablet, TAKE 1 TABLET BY MOUTH EVERY DAY, Disp: 90 tablet, Rfl: 0    Nutritional Supplements (THERALITH XR) TABS, Take by mouth 2 (two) times a day 2 tablets BID, Disp: , Rfl:     omeprazole (PriLOSEC) 20 mg delayed release capsule, Take 20 mg by mouth daily as needed  , Disp: , Rfl:   Allergies   Allergen Reactions    Moxifloxacin Shortness Of Breath and Hives    Alcohol      vertigo    Caffeine      vertigo    Cephalosporins      hyperventilation    Doxycycline     Penicillins Hives    Pravastatin      Other reaction(s): Respiratory Distress    Tamiflu [Oseltamivir] Lightheadedness     Hyperventilation       Labs:not applicable  Imaging: No results found  Review of Systems:  Review of Systems   All other systems reviewed and are negative  Physical Exam:  Physical Exam   Constitutional: He is oriented to person, place, and time  He appears well-developed and well-nourished  HENT:   Head: Normocephalic and atraumatic  Eyes: Conjunctivae are normal  Pupils are equal, round, and reactive to light  Neck: Normal range of motion  Neck supple  Cardiovascular: Normal rate and normal heart sounds  Pulmonary/Chest: Effort normal and breath sounds normal    Neurological: He is alert and oriented to person, place, and time  Skin: Skin is warm and dry  Psychiatric: He has a normal mood and affect  Vitals reviewed  Discussion/Summary:I will continue the patient's present medical regimen  The patient appears well compensated  I have asked the patient to call if there is a problem in the interim otherwise I will see the patient in one years time  Patient is due for an echocardiogram prior to the next visit to assess LV wall thickness and systolic function

## 2022-12-08 ENCOUNTER — ANESTHESIA (OUTPATIENT)
Dept: ANESTHESIOLOGY | Facility: AMBULATORY SURGERY CENTER | Age: 67
End: 2022-12-08

## 2022-12-08 ENCOUNTER — ANESTHESIA EVENT (OUTPATIENT)
Dept: ANESTHESIOLOGY | Facility: AMBULATORY SURGERY CENTER | Age: 67
End: 2022-12-08

## 2022-12-08 NOTE — ANESTHESIA PREPROCEDURE EVALUATION
Procedure:  PRE-OP ONLY    Relevant Problems   MUSCULOSKELETAL   (+) Bursitis of left knee   (+) Pes anserinus bursitis of left knee   (+) Shoulder impingement syndrome, right     Kidney stone Pulmonary embolism (HCC)   Meniere disease GERD (gastroesophageal reflux disease)   Hyperlipidemia                Anesthesia Plan  ASA Score- 2     Anesthesia Type- IV sedation with anesthesia with ASA Monitors  Additional Monitors:   Airway Plan:           Plan Factors-    Chart reviewed  Patient is not a current smoker  Induction- intravenous  Postoperative Plan-     Informed Consent- Anesthetic plan and risks discussed with patient  I personally reviewed this patient with the CRNA  Discussed and agreed on the Anesthesia Plan with the CRNA  Dara Soulier

## 2022-12-09 ENCOUNTER — ANESTHESIA (OUTPATIENT)
Dept: GASTROENTEROLOGY | Facility: AMBULATORY SURGERY CENTER | Age: 67
End: 2022-12-09

## 2022-12-09 ENCOUNTER — HOSPITAL ENCOUNTER (OUTPATIENT)
Dept: GASTROENTEROLOGY | Facility: AMBULATORY SURGERY CENTER | Age: 67
Discharge: HOME | End: 2022-12-09
Attending: INTERNAL MEDICINE

## 2022-12-09 ENCOUNTER — ANESTHESIA EVENT (OUTPATIENT)
Dept: GASTROENTEROLOGY | Facility: AMBULATORY SURGERY CENTER | Age: 67
End: 2022-12-09

## 2022-12-09 VITALS
SYSTOLIC BLOOD PRESSURE: 192 MMHG | OXYGEN SATURATION: 98 % | HEART RATE: 63 BPM | TEMPERATURE: 97.4 F | WEIGHT: 220 LBS | BODY MASS INDEX: 34.53 KG/M2 | HEIGHT: 67 IN | DIASTOLIC BLOOD PRESSURE: 88 MMHG | RESPIRATION RATE: 17 BRPM

## 2022-12-09 DIAGNOSIS — Z86.010 HISTORY OF COLONIC POLYPS: ICD-10-CM

## 2022-12-09 RX ORDER — SODIUM CHLORIDE, SODIUM LACTATE, POTASSIUM CHLORIDE, CALCIUM CHLORIDE 600; 310; 30; 20 MG/100ML; MG/100ML; MG/100ML; MG/100ML
INJECTION, SOLUTION INTRAVENOUS CONTINUOUS PRN
Status: DISCONTINUED | OUTPATIENT
Start: 2022-12-09 | End: 2022-12-09

## 2022-12-09 RX ORDER — PROPOFOL 10 MG/ML
INJECTION, EMULSION INTRAVENOUS AS NEEDED
Status: DISCONTINUED | OUTPATIENT
Start: 2022-12-09 | End: 2022-12-09

## 2022-12-09 RX ORDER — SODIUM CHLORIDE, SODIUM LACTATE, POTASSIUM CHLORIDE, CALCIUM CHLORIDE 600; 310; 30; 20 MG/100ML; MG/100ML; MG/100ML; MG/100ML
50 INJECTION, SOLUTION INTRAVENOUS CONTINUOUS
Status: DISCONTINUED | OUTPATIENT
Start: 2022-12-09 | End: 2022-12-13 | Stop reason: HOSPADM

## 2022-12-09 RX ADMIN — PROPOFOL 50 MG: 10 INJECTION, EMULSION INTRAVENOUS at 10:30

## 2022-12-09 RX ADMIN — PROPOFOL 100 MG: 10 INJECTION, EMULSION INTRAVENOUS at 10:18

## 2022-12-09 RX ADMIN — PROPOFOL 50 MG: 10 INJECTION, EMULSION INTRAVENOUS at 10:25

## 2022-12-09 RX ADMIN — SODIUM CHLORIDE, SODIUM LACTATE, POTASSIUM CHLORIDE, CALCIUM CHLORIDE: 600; 310; 30; 20 INJECTION, SOLUTION INTRAVENOUS at 10:15

## 2022-12-09 RX ADMIN — SODIUM CHLORIDE, SODIUM LACTATE, POTASSIUM CHLORIDE, CALCIUM CHLORIDE 50 ML/HR: 600; 310; 30; 20 INJECTION, SOLUTION INTRAVENOUS at 10:06

## 2022-12-09 RX ADMIN — PROPOFOL 50 MG: 10 INJECTION, EMULSION INTRAVENOUS at 10:21

## 2022-12-09 NOTE — H&P
History and Physical - [facility]  Ray Walters  79 y o  male MRN: [id number]    HPI: Ray Walters  is a 79y o  year old male who presents for Colonoscopy for history of colon polyps  Last colonoscopy was 5 years ago  REVIEW OF SYSTEMS: Per the HPI, and otherwise unremarkable      Historical Information   Past Medical History:   Diagnosis Date   • GERD (gastroesophageal reflux disease)    • Hyperlipidemia    • Kidney stone    • Meniere disease    • Pulmonary embolism (HCC)      Past Surgical History:   Procedure Laterality Date   • ABDOMINAL SURGERY     • BACK SURGERY     • COCHLEAR IMPLANT Right    • FL INJECTION LEFT SHOULDER (ARTHROGRAM)  1/19/2022   • HERNIA REPAIR     • IVC FILTER INSERTION       Social History   Social History     Substance and Sexual Activity   Alcohol Use No     Social History     Substance and Sexual Activity   Drug Use No     Social History     Tobacco Use   Smoking Status Never   Smokeless Tobacco Never     Family History   Problem Relation Age of Onset   • Hypertension Mother    • Hyperlipidemia Mother    • Hypertension Father    • Hyperlipidemia Father        Meds/Allergies       Current Outpatient Medications:   •  chlorthalidone 25 mg tablet  •  Klor-Con M20 20 MEQ tablet  •  Multiple Vitamin (multivitamin) tablet  •  Nutritional Supplements (THERALITH XR) TABS  •  ezetimibe (ZETIA) 10 mg tablet  •  omeprazole (PriLOSEC) 20 mg delayed release capsule    Current Facility-Administered Medications:   •  lactated ringers infusion, 50 mL/hr, Intravenous, Continuous, 50 mL/hr at 12/09/22 1006    Allergies   Allergen Reactions   • Moxifloxacin Shortness Of Breath and Hives   • Alcohol - Food Allergy      vertigo   • Caffeine - Food Allergy      vertigo   • Cephalosporins      hyperventilation   • Doxycycline    • Penicillins Hives   • Pravastatin      Other reaction(s): Respiratory Distress   • Prednisone Tinnitus     Other reaction(s): Unknown   • Tamiflu [Oseltamivir] Lightheadedness     Hyperventilation       Objective     /79   Pulse 64   Temp (!) 97 4 °F (36 3 °C) (Temporal)   Resp (!) 24   Ht 5' 7" (1 702 m)   Wt 99 8 kg (220 lb)   SpO2 95%   BMI 34 46 kg/m²     PHYSICAL EXAM    Gen: NAD AAOx3  Head: Normocephalic, Atraumatic  CV: S1S2 RRR no m/r/g  CHEST: Clear b/l no c/r/w  ABD: soft, +BS NT/ND  EXT: no edema    ASSESSMENT/PLAN:  This is a 79y o  year old male here for colonoscopy, and he is stable and optimized for his procedure

## 2022-12-09 NOTE — ANESTHESIA POSTPROCEDURE EVALUATION
Post-Op Assessment Note    CV Status:  Stable  Pain Score: 0    Pain management: adequate     Mental Status:  Alert and awake   Hydration Status:  Euvolemic and stable   PONV Controlled:  Controlled   Airway Patency:  Patent and adequate      Post Op Vitals Reviewed: Yes      Staff: CRNA         No notable events documented      BP      Temp     Pulse     Resp     SpO2

## 2022-12-09 NOTE — ANESTHESIA PREPROCEDURE EVALUATION
Procedure:  COLONOSCOPY    Relevant Problems   MUSCULOSKELETAL   (+) Bursitis of left knee   (+) Pes anserinus bursitis of left knee   (+) Shoulder impingement syndrome, right     Kidney stone Pulmonary embolism (HCC)   Meniere disease GERD (gastroesophageal reflux disease)   Hyperlipidemia      IVC Filter X > 20 years  No Blood thinners  R Cochlear Implant   Asc  Aortic Dilatation  7/22  Left Ventricle: Left ventricular cavity size is normal  There is mild concentric hypertrophy  The left ventricular ejection fraction is 60%  Systolic function is normal  Wall motion is normal  Diastolic function is normal for age  •  Right Ventricle: Right ventricular cavity size is normal  Systolic function is normal   •  Mitral Valve: There is trace regurgitation  •  Tricuspid Valve: There is mild regurgitation  •  Aorta: The aortic root is normal in size    Physical Exam    Airway    Mallampati score: II  TM Distance: >3 FB  Neck ROM: full     Dental   No notable dental hx     Cardiovascular      Pulmonary      Other Findings        Anesthesia Plan  ASA Score- 2     Anesthesia Type- IV sedation with anesthesia with ASA Monitors  Additional Monitors:   Airway Plan:           Plan Factors-Exercise tolerance (METS): >4 METS  Chart reviewed  Patient is not a current smoker  Induction- intravenous  Postoperative Plan-     Informed Consent- Anesthetic plan and risks discussed with patient  I personally reviewed this patient with the CRNA  Discussed and agreed on the Anesthesia Plan with the CRNA  Autumn Chandler

## 2023-01-01 DIAGNOSIS — I10 HYPERTENSION, UNSPECIFIED TYPE: ICD-10-CM

## 2023-01-03 RX ORDER — CHLORTHALIDONE 25 MG/1
25 TABLET ORAL DAILY
Qty: 90 TABLET | Refills: 1 | Status: SHIPPED | OUTPATIENT
Start: 2023-01-03

## 2023-01-20 ENCOUNTER — TELEPHONE (OUTPATIENT)
Dept: GASTROENTEROLOGY | Facility: CLINIC | Age: 68
End: 2023-01-20

## 2023-01-20 ENCOUNTER — OFFICE VISIT (OUTPATIENT)
Dept: GASTROENTEROLOGY | Facility: CLINIC | Age: 68
End: 2023-01-20

## 2023-01-20 VITALS
WEIGHT: 223 LBS | DIASTOLIC BLOOD PRESSURE: 84 MMHG | BODY MASS INDEX: 35 KG/M2 | HEIGHT: 67 IN | SYSTOLIC BLOOD PRESSURE: 138 MMHG

## 2023-01-20 DIAGNOSIS — Z86.010 HISTORY OF COLONIC POLYPS: ICD-10-CM

## 2023-01-20 DIAGNOSIS — K64.8 OTHER HEMORRHOIDS: ICD-10-CM

## 2023-01-20 DIAGNOSIS — K21.9 GASTROESOPHAGEAL REFLUX DISEASE, UNSPECIFIED WHETHER ESOPHAGITIS PRESENT: Primary | ICD-10-CM

## 2023-01-20 DIAGNOSIS — R13.10 DYSPHAGIA, UNSPECIFIED TYPE: ICD-10-CM

## 2023-01-20 NOTE — TELEPHONE ENCOUNTER
Scheduled date of EGD(as of today): 3/17/2023  Physician performing EGD: Dr Karly Ferreira  Location of EGD: [facility] ([facility])  Instructions reviewed with patient by: Gave pt instructions packet  Clearances: n/a

## 2023-01-20 NOTE — PROGRESS NOTES
[id number] - Outpatient Follow-up Note  Meera Rubio  79 y o  male MRN: [id number]  Encounter: [id number]    ASSESSMENT AND PLAN:      1  Gastroesophageal reflux disease, unspecified whether esophagitis present  2  Dysphagia, unspecified type    Patient with a history of GERD and intermittent reflux symptoms  Continue conservative therapy  Increase the omeprazole 20 mg once daily  History of EGD in 2021 else unremarkable  History of Schatzki's ring in the past   We will evaluate with EGD as symptoms are increasing and has episodes of dysphagia in order to rule out any structural abnormalities or erosive disease or ulcerative disease     - EGD; Future    3  History of colonic polyps  Had a colonoscopy on 12/9/2022 with a total of 5 subcentimeter polyps, 4 in the transverse, 1 in the descending  Serrated lesions   Recall recommended in 3 years  4  Other hemorrhoids  History of hemorrhoidal banding in the past x3  Now with external hemorrhoid on physical exam   Will start fiber supplementation and conservative management of hemorrhoids  Follow up appointment: For EGD  ______________________________________________________________________    Chief Complaint   Patient presents with   • Upper abd  pain     HPI:   51-year-old male past medical history of hypertension, GERD, PE sp IVC filter,  presenting for follow-up  Was last seen in the office in October 2022 for history of colon polyps, constipation, hemorrhoids, GERD    Had a colonoscopy on 12/9/2022 with a total of 5 subcentimeter polyps, 4 in the transverse, 1 in the descending  Serrated lesions   Recall recommended in 3 years  He is doing well in regards to his constipation  Having regular bowel movements on fiber supplementation  Also with a history of external hemorrhoids with improvement after conservative treatment with fiber  Patient states that he has been having increased amounts of reflux symptoms  Takes as needed omeprazole 20 mg  Patient with a history of Schatzki's ring in the past   Also states that he has episodes of dysphagia and chest discomfort  Denies any unintentional weight loss  Denies any nausea vomiting  Historical Information   Past Medical History:   Diagnosis Date   • GERD (gastroesophageal reflux disease)    • Hyperlipidemia    • Kidney stone    • Meniere disease    • Pulmonary embolism (HCC)      Past Surgical History:   Procedure Laterality Date   • ABDOMINAL SURGERY     • BACK SURGERY     • COCHLEAR IMPLANT Right    • FL INJECTION LEFT SHOULDER (ARTHROGRAM)  1/19/2022   • HERNIA REPAIR     • IVC FILTER INSERTION       Social History     Substance and Sexual Activity   Alcohol Use No     Social History     Substance and Sexual Activity   Drug Use No     Social History     Tobacco Use   Smoking Status Never   Smokeless Tobacco Never     Family History   Problem Relation Age of Onset   • Hypertension Mother    • Hyperlipidemia Mother    • Hypertension Father    • Hyperlipidemia Father          Current Outpatient Medications:   •  chlorthalidone 25 mg tablet  •  Klor-Con M20 20 MEQ tablet  •  Multiple Vitamin (multivitamin) tablet  •  Nutritional Supplements (THERALITH XR) TABS  •  omeprazole (PriLOSEC) 20 mg delayed release capsule  •  ezetimibe (ZETIA) 10 mg tablet  Allergies   Allergen Reactions   • Moxifloxacin Shortness Of Breath and Hives   • Alcohol - Food Allergy      vertigo   • Caffeine - Food Allergy      vertigo   • Cephalosporins      hyperventilation   • Doxycycline    • Penicillins Hives   • Pravastatin      Other reaction(s): Respiratory Distress   • Prednisone Tinnitus     Other reaction(s): Unknown   • Tamiflu [Oseltamivir] Lightheadedness     Hyperventilation     Reviewed medications and allergies and updated as indicated    PHYSICAL EXAM:    Blood pressure 138/84, height 5' 7" (1 702 m), weight 101 kg (223 lb)  Body mass index is 34 93 kg/m²    General Appearance: NAD, cooperative, alert  Eyes: Anicteric  GI:  Soft, non-tender, non-distended; normal bowel sounds; no masses, no organomegaly   Rectal: Deferred  Musculoskeletal: No edema  Skin:  No jaundice    Lab Results:   Lab Results   Component Value Date    WBC 6 49 12/06/2018    WBC 6 50 10/19/2017    WBC 4 69 02/12/2017    HGB 15 9 12/06/2018    HGB 15 3 07/11/2018    HGB 16 1 10/19/2017    MCV 93 12/06/2018     12/06/2018     10/19/2017     02/12/2017     Lab Results   Component Value Date     10/07/2015    K 3 7 06/29/2021     06/29/2021    CO2 28 06/29/2021    ANIONGAP 9 10/07/2015    BUN 19 06/29/2021    CREATININE 1 13 06/29/2021    GLUCOSE 115 07/11/2018    CALCIUM 8 9 12/06/2018    AST 27 10/30/2020    AST 28 12/06/2018    AST 25 10/19/2017    ALT 34 10/30/2020    ALT 49 12/06/2018    ALT 37 10/19/2017    ALKPHOS 97 12/06/2018    ALKPHOS 108 10/19/2017    ALKPHOS 89 05/27/2016    PROT 7 3 10/07/2015    BILITOT 0 67 10/07/2015    EGFR 71 12/06/2018     No results found for: IRON, TIBC, FERRITIN  Lab Results   Component Value Date    LIPASE 122 12/06/2018       Radiology Results:   No results found

## 2023-02-09 ENCOUNTER — CLINICAL SUPPORT (OUTPATIENT)
Dept: CARDIOLOGY CLINIC | Facility: CLINIC | Age: 68
End: 2023-02-09

## 2023-02-09 ENCOUNTER — TELEPHONE (OUTPATIENT)
Dept: CARDIOLOGY CLINIC | Facility: CLINIC | Age: 68
End: 2023-02-09

## 2023-02-09 VITALS
HEART RATE: 67 BPM | DIASTOLIC BLOOD PRESSURE: 82 MMHG | BODY MASS INDEX: 34.93 KG/M2 | HEIGHT: 67 IN | SYSTOLIC BLOOD PRESSURE: 160 MMHG

## 2023-02-09 DIAGNOSIS — I10 ESSENTIAL (PRIMARY) HYPERTENSION: ICD-10-CM

## 2023-02-09 DIAGNOSIS — I10 HYPERTENSION, UNSPECIFIED TYPE: Primary | ICD-10-CM

## 2023-02-09 RX ORDER — LOSARTAN POTASSIUM 50 MG/1
50 TABLET ORAL DAILY
Qty: 30 TABLET | Refills: 2 | Status: SHIPPED | OUTPATIENT
Start: 2023-02-09

## 2023-02-09 NOTE — TELEPHONE ENCOUNTER
----- Message from Man Domingo MD sent at 2/9/2023 11:18 AM EST -----  Would encourage patient to keep appointment with PA as scheduled    Thank you     ----- Message -----  From: Fanny Rogers  Sent: 2/9/2023  11:06 AM EST  To: Man Domingo MD, #

## 2023-02-09 NOTE — PROGRESS NOTES
Pt of Dr Lorena Garrido- appt made on 3/15    Pt came into office c/o frequent dizziness, can occur with just standing  Pt does state at home BP runs elevated around 160-170 range    Pt wanted to check home cuff accuracy, nurse visit was made to do this    BP- 160/82  HR-67    Dr Marlene Hernandez in office to review BP- he started pt on Losartan 50mg daily     Rx is being sent to [facility] [address] made with Rosaura Encinas on 2/28 as nothing was available sooner with Dr Lorena Garrido    Should pt keep appt with PA?     Please advise

## 2023-03-01 ENCOUNTER — TELEPHONE (OUTPATIENT)
Dept: CARDIOLOGY CLINIC | Facility: CLINIC | Age: 68
End: 2023-03-01

## 2023-03-01 NOTE — TELEPHONE ENCOUNTER
Spoke with patient on 2/27/2023 and he is aware that his 2/28/2023 QT appointment with Noemi Solomon has been cancelled    Patient is aware that the office will contact him to reschedule this asap/justine

## 2023-03-02 ENCOUNTER — TELEPHONE (OUTPATIENT)
Dept: GASTROENTEROLOGY | Facility: CLINIC | Age: 68
End: 2023-03-02

## 2023-03-04 DIAGNOSIS — I10 ESSENTIAL (PRIMARY) HYPERTENSION: ICD-10-CM

## 2023-03-04 DIAGNOSIS — I10 HYPERTENSION, UNSPECIFIED TYPE: ICD-10-CM

## 2023-03-06 RX ORDER — LOSARTAN POTASSIUM 50 MG/1
TABLET ORAL
Qty: 90 TABLET | Refills: 1 | Status: SHIPPED | OUTPATIENT
Start: 2023-03-06

## 2023-03-06 NOTE — PROGRESS NOTES
Cardiology Follow Up    [facility]   1955  [id number]  [address] [facility] [place] [id number] [phone]    1  Essential (primary) hypertension  Echo complete w/ contrast if indicated      2  Pure hypercholesterolemia  Echo complete w/ contrast if indicated      3  Mild ascending aorta dilatation (HCC)  Echo complete w/ contrast if indicated      4  Palpitations  Echo complete w/ contrast if indicated          Interval History: Patient is here for a follow-up visit  Theresa Dowell has a mild descending thoracic aortic aneurysm seen on CT scan performed December 2018   It was 4 3 x 3 9 cm  He had IVC filter placement in reference to prior veno-thromboembolism  ETT January 2019  demonstrated no evidence of provokable ischemia   His LVEF was 62%   Patient admits to statin intolerance in reference to muscle and memory issues   He did not even tolerate pravastatin 10 mg per day  A lipid profile done 8/2022 demonstrated total cholesterol of 193 with an HDL of 41 and a calculated LDL of 118   Patient was on Zetia  Patient was having issues with palpitations  48-hour HM done July 2022 demonstrated NSR echocardiogram and with average heart rate of 75 and with rare PACs and PVCs  He does not drink alcohol or caffeinated beverages  Echocardiogram done July 2022 demonstrated LVEF of 60% with no significant valve disease  Mild dilatation of the ascending aorta at 4 0 cm was noted  Patient was seen here in the office February 9 in reference to palpitations and hypertension  He was placed on losartan and his blood pressure has been better controlled  He monitors it at home  His systolics are generally in the 130s  He no longer has palpitations      Patient Active Problem List   Diagnosis   • Pes anserinus bursitis of left knee   • Bursitis of left knee   • Shoulder impingement syndrome, right   • Nontraumatic complete tear of left rotator cuff     Past Medical History:   Diagnosis Date   • GERD (gastroesophageal reflux disease)    • Hyperlipidemia    • Kidney stone    • Meniere disease    • Pulmonary embolism (HCC)      Social History     Socioeconomic History   • Marital status:      Spouse name: Not on file   • Number of children: Not on file   • Years of education: Not on file   • Highest education level: Not on file   Occupational History   • Not on file   Tobacco Use   • Smoking status: Never   • Smokeless tobacco: Never   Vaping Use   • Vaping Use: Never used   Substance and Sexual Activity   • Alcohol use: No   • Drug use: No   • Sexual activity: Not on file   Other Topics Concern   • Not on file   Social History Narrative   • Not on file     Social Determinants of Health     Financial Resource Strain: Not on file   Food Insecurity: Not on file   Transportation Needs: Not on file   Physical Activity: Not on file   Stress: Not on file   Social Connections: Not on file   Intimate Partner Violence: Not on file   Housing Stability: Not on file      Family History   Problem Relation Age of Onset   • Hypertension Mother    • Hyperlipidemia Mother    • Hypertension Father    • Hyperlipidemia Father      Past Surgical History:   Procedure Laterality Date   • ABDOMINAL SURGERY     • BACK SURGERY     • COCHLEAR IMPLANT Right    • FL INJECTION LEFT SHOULDER (ARTHROGRAM)  1/19/2022   • HERNIA REPAIR     • IVC FILTER INSERTION         Current Outpatient Medications:   •  chlorthalidone 25 mg tablet, TAKE 1 TABLET (25 MG TOTAL) BY MOUTH DAILY  , Disp: 90 tablet, Rfl: 1  •  Klor-Con M20 20 MEQ tablet, TAKE 1 TABLET (20 MEQ TOTAL) BY MOUTH 2 (TWO) TIMES A DAY, Disp: 180 tablet, Rfl: 1  •  losartan (COZAAR) 50 mg tablet, TAKE 1 TABLET BY MOUTH EVERY DAY, Disp: 90 tablet, Rfl: 1  •  Multiple Vitamin (multivitamin) tablet, Take 1 tablet by mouth daily, Disp: , Rfl:   •  Nutritional Supplements (THERALITH XR) TABS, Take by mouth 2 (two) times a day 2 tablets BID, Disp: , Rfl:   •  omeprazole (PriLOSEC) 20 mg delayed release capsule, Take 20 mg by mouth daily, Disp: , Rfl:   •  ezetimibe (ZETIA) 10 mg tablet, Take 10 mg by mouth every other day  (Patient not taking: Reported on 10/19/2022), Disp: , Rfl:   Allergies   Allergen Reactions   • Moxifloxacin Shortness Of Breath and Hives   • Alcohol - Food Allergy      vertigo   • Caffeine - Food Allergy      vertigo   • Cephalosporins      hyperventilation   • Doxycycline    • Penicillins Hives   • Pravastatin      Other reaction(s): Respiratory Distress   • Prednisone Tinnitus     Other reaction(s): Unknown   • Tamiflu [Oseltamivir] Lightheadedness     Hyperventilation       Labs:not applicable  Imaging: No results found  Review of Systems:  Review of Systems   All other systems reviewed and are negative  Physical Exam:  /76 (BP Location: Left arm, Patient Position: Sitting, Cuff Size: Standard)   Pulse 67   Ht 5' 7" (1 702 m)   Wt 102 kg (225 lb)   BMI 35 24 kg/m²   Physical Exam  Vitals reviewed  Constitutional:       Appearance: He is well-developed  HENT:      Head: Normocephalic and atraumatic  Cardiovascular:      Rate and Rhythm: Normal rate  Heart sounds: Normal heart sounds  Pulmonary:      Effort: Pulmonary effort is normal       Breath sounds: Normal breath sounds  Musculoskeletal:      Cervical back: Normal range of motion  Skin:     General: Skin is warm and dry  Neurological:      Mental Status: He is alert and oriented to person, place, and time  Discussion/Summary:I will continue the patient's current medical regimen  The patient appears well compensated  I have asked the patient to call if there is a problem in the interim otherwise I will see the patient in six months time  The patient is due for an echo prior to the next visit to assess LV wall thickness and systolic function

## 2023-03-15 ENCOUNTER — OFFICE VISIT (OUTPATIENT)
Dept: CARDIOLOGY CLINIC | Facility: CLINIC | Age: 68
End: 2023-03-15

## 2023-03-15 VITALS
HEIGHT: 67 IN | DIASTOLIC BLOOD PRESSURE: 76 MMHG | HEART RATE: 67 BPM | WEIGHT: 225 LBS | BODY MASS INDEX: 35.31 KG/M2 | SYSTOLIC BLOOD PRESSURE: 140 MMHG

## 2023-03-15 DIAGNOSIS — E78.00 PURE HYPERCHOLESTEROLEMIA: ICD-10-CM

## 2023-03-15 DIAGNOSIS — I10 ESSENTIAL (PRIMARY) HYPERTENSION: Primary | ICD-10-CM

## 2023-03-15 DIAGNOSIS — I77.810 MILD ASCENDING AORTA DILATATION (HCC): ICD-10-CM

## 2023-03-15 DIAGNOSIS — R00.2 PALPITATIONS: ICD-10-CM

## 2023-03-15 NOTE — PATIENT INSTRUCTIONS
I will continue the patient's current medical regimen  The patient appears well compensated  I have asked the patient to call if there is a problem in the interim otherwise I will see the patient in 6 months time  The patient is due for an echo prior to the next visit to assess LV wall thickness and systolic function

## 2023-03-17 ENCOUNTER — ANESTHESIA (OUTPATIENT)
Dept: GASTROENTEROLOGY | Facility: AMBULATORY SURGERY CENTER | Age: 68
End: 2023-03-17

## 2023-03-17 ENCOUNTER — ANESTHESIA EVENT (OUTPATIENT)
Dept: GASTROENTEROLOGY | Facility: AMBULATORY SURGERY CENTER | Age: 68
End: 2023-03-17

## 2023-03-17 ENCOUNTER — HOSPITAL ENCOUNTER (OUTPATIENT)
Dept: GASTROENTEROLOGY | Facility: AMBULATORY SURGERY CENTER | Age: 68
Discharge: HOME | End: 2023-03-17
Attending: INTERNAL MEDICINE

## 2023-03-17 VITALS
WEIGHT: 225 LBS | TEMPERATURE: 97.7 F | RESPIRATION RATE: 18 BRPM | HEART RATE: 55 BPM | SYSTOLIC BLOOD PRESSURE: 139 MMHG | DIASTOLIC BLOOD PRESSURE: 72 MMHG | BODY MASS INDEX: 35.31 KG/M2 | OXYGEN SATURATION: 97 % | HEIGHT: 67 IN

## 2023-03-17 DIAGNOSIS — K21.9 GASTROESOPHAGEAL REFLUX DISEASE, UNSPECIFIED WHETHER ESOPHAGITIS PRESENT: ICD-10-CM

## 2023-03-17 DIAGNOSIS — R13.10 DYSPHAGIA, UNSPECIFIED TYPE: ICD-10-CM

## 2023-03-17 RX ORDER — LIDOCAINE HYDROCHLORIDE 10 MG/ML
INJECTION, SOLUTION EPIDURAL; INFILTRATION; INTRACAUDAL; PERINEURAL AS NEEDED
Status: DISCONTINUED | OUTPATIENT
Start: 2023-03-17 | End: 2023-03-17

## 2023-03-17 RX ORDER — SODIUM CHLORIDE, SODIUM LACTATE, POTASSIUM CHLORIDE, CALCIUM CHLORIDE 600; 310; 30; 20 MG/100ML; MG/100ML; MG/100ML; MG/100ML
50 INJECTION, SOLUTION INTRAVENOUS CONTINUOUS
Status: DISCONTINUED | OUTPATIENT
Start: 2023-03-17 | End: 2023-03-21 | Stop reason: HOSPADM

## 2023-03-17 RX ORDER — PROPOFOL 10 MG/ML
INJECTION, EMULSION INTRAVENOUS AS NEEDED
Status: DISCONTINUED | OUTPATIENT
Start: 2023-03-17 | End: 2023-03-17

## 2023-03-17 RX ADMIN — SODIUM CHLORIDE, SODIUM LACTATE, POTASSIUM CHLORIDE, CALCIUM CHLORIDE 50 ML/HR: 600; 310; 30; 20 INJECTION, SOLUTION INTRAVENOUS at 09:53

## 2023-03-17 RX ADMIN — PROPOFOL 20 MG: 10 INJECTION, EMULSION INTRAVENOUS at 10:17

## 2023-03-17 RX ADMIN — PROPOFOL 30 MG: 10 INJECTION, EMULSION INTRAVENOUS at 10:18

## 2023-03-17 RX ADMIN — LIDOCAINE HYDROCHLORIDE 100 MG: 10 INJECTION, SOLUTION EPIDURAL; INFILTRATION; INTRACAUDAL; PERINEURAL at 10:16

## 2023-03-17 RX ADMIN — PROPOFOL 100 MG: 10 INJECTION, EMULSION INTRAVENOUS at 10:16

## 2023-03-17 NOTE — ANESTHESIA POSTPROCEDURE EVALUATION
Post-Op Assessment Note    CV Status:  Stable  Pain Score: 0    Pain management: adequate     Mental Status:  Sleepy   Hydration Status:  Euvolemic   PONV Controlled:  None   Airway Patency:  Patent      Post Op Vitals Reviewed: Yes      Staff: CRNA         No notable events documented      BP  108/61   Temp      Pulse  62   Resp  15    SpO2   98

## 2023-03-17 NOTE — ANESTHESIA PREPROCEDURE EVALUATION
Procedure:  EGD    Relevant Problems   CARDIO   (+) Hyperlipidemia   (+) Hypertension      GI/HEPATIC   (+) GERD (gastroesophageal reflux disease)      MUSCULOSKELETAL   (+) Bursitis of left knee   (+) Pes anserinus bursitis of left knee   (+) Shoulder impingement syndrome, right        Physical Exam    Airway    Mallampati score: II  TM Distance: >3 FB  Neck ROM: full     Dental   No notable dental hx     Cardiovascular      Pulmonary      Other Findings        Anesthesia Plan  ASA Score- 2     Anesthesia Type- IV sedation with anesthesia with ASA Monitors  Additional Monitors:   Airway Plan:           Plan Factors-Exercise tolerance (METS): >4 METS  Chart reviewed  Patient summary reviewed  Patient is not a current smoker  Induction- intravenous  Postoperative Plan-     Informed Consent- Anesthetic plan and risks discussed with patient  I personally reviewed this patient with the CRNA  Discussed and agreed on the Anesthesia Plan with the CRNA  Thomasina Cooks

## 2023-03-17 NOTE — H&P
History and Physical - [facility]  Nedra Knowles  79 y o  male MRN: [id number]    HPI: Nedra Knowles  is a 79y o  year old male who presents for EGD for dysphagia    REVIEW OF SYSTEMS: Per the HPI, and otherwise unremarkable      Historical Information   Past Medical History:   Diagnosis Date   • GERD (gastroesophageal reflux disease)    • Hyperlipidemia    • Hypertension    • Kidney stone    • Meniere disease    • Pulmonary embolism (HCC)      Past Surgical History:   Procedure Laterality Date   • ABDOMINAL SURGERY     • BACK SURGERY     • COCHLEAR IMPLANT Right    • COLONOSCOPY     • FL INJECTION LEFT SHOULDER (ARTHROGRAM)  01/19/2022   • HERNIA REPAIR     • IVC FILTER INSERTION       Social History   Social History     Substance and Sexual Activity   Alcohol Use No     Social History     Substance and Sexual Activity   Drug Use No     Social History     Tobacco Use   Smoking Status Never   Smokeless Tobacco Never     Family History   Problem Relation Age of Onset   • Hypertension Mother    • Hyperlipidemia Mother    • Hypertension Father    • Hyperlipidemia Father        Meds/Allergies       Current Outpatient Medications:   •  chlorthalidone 25 mg tablet  •  Klor-Con M20 20 MEQ tablet  •  losartan (COZAAR) 50 mg tablet  •  Multiple Vitamin (multivitamin) tablet  •  Nutritional Supplements (THERALITH XR) TABS  •  ezetimibe (ZETIA) 10 mg tablet  •  omeprazole (PriLOSEC) 20 mg delayed release capsule    Current Facility-Administered Medications:   •  lactated ringers infusion, 50 mL/hr, Intravenous, Continuous, 50 mL/hr at 03/17/23 9828    Allergies   Allergen Reactions   • Moxifloxacin Shortness Of Breath and Hives   • Alcohol - Food Allergy      vertigo   • Caffeine - Food Allergy      vertigo   • Cephalosporins      hyperventilation   • Doxycycline    • Penicillins Hives   • Pravastatin      Other reaction(s): Respiratory Distress   • Prednisone Tinnitus     Other reaction(s): Unknown • Tamiflu [Oseltamivir] Lightheadedness     Hyperventilation       Objective     BP (!) 177/81   Pulse 66   Temp 97 7 °F (36 5 °C) (Temporal)   Resp 20   Ht 5' 7" (1 702 m)   Wt 102 kg (225 lb)   SpO2 98%   BMI 35 24 kg/m²     PHYSICAL EXAM    Gen: NAD AAOx3  Head: Normocephalic, Atraumatic  CV: S1S2 RRR no m/r/g  CHEST: Clear b/l no c/r/w  ABD: soft, +BS NT/ND  EXT: no edema    ASSESSMENT/PLAN:  This is a 79y o  year old male here for EGD, and he is stable and optimized for his procedure

## 2023-05-18 DIAGNOSIS — E87.6 HYPOKALEMIA: ICD-10-CM

## 2023-05-18 RX ORDER — POTASSIUM CHLORIDE 1500 MG/1
TABLET, EXTENDED RELEASE ORAL
Qty: 180 TABLET | Refills: 1 | Status: SHIPPED | OUTPATIENT
Start: 2023-05-18

## 2023-07-14 ENCOUNTER — HOSPITAL ENCOUNTER (OUTPATIENT)
Dept: NON INVASIVE DIAGNOSTICS | Age: 68
Discharge: HOME | End: 2023-07-14
Payer: COMMERCIAL

## 2023-07-14 VITALS
WEIGHT: 225 LBS | BODY MASS INDEX: 35.31 KG/M2 | HEIGHT: 67 IN | SYSTOLIC BLOOD PRESSURE: 139 MMHG | DIASTOLIC BLOOD PRESSURE: 72 MMHG | HEART RATE: 70 BPM

## 2023-07-14 DIAGNOSIS — I10 ESSENTIAL (PRIMARY) HYPERTENSION: ICD-10-CM

## 2023-07-14 DIAGNOSIS — E78.00 PURE HYPERCHOLESTEROLEMIA: ICD-10-CM

## 2023-07-14 DIAGNOSIS — R00.2 PALPITATIONS: ICD-10-CM

## 2023-07-14 DIAGNOSIS — I77.810 MILD ASCENDING AORTA DILATATION (HCC): ICD-10-CM

## 2023-07-14 LAB
AORTIC ROOT: 3.2 CM
APICAL FOUR CHAMBER EJECTION FRACTION: 67 %
ASCENDING AORTA: 3.4 CM
E WAVE DECELERATION TIME: 237 MS
FRACTIONAL SHORTENING: 29 (ref 28–44)
INTERVENTRICULAR SEPTUM IN DIASTOLE (PARASTERNAL SHORT AXIS VIEW): 1 CM
INTERVENTRICULAR SEPTUM: 1 CM (ref 0.6–1.1)
LAAS-AP2: 10.3 CM2
LAAS-AP4: 11.9 CM2
LEFT ATRIUM SIZE: 4 CM
LEFT ATRIUM VOLUME (MOD BIPLANE): 29 ML
LEFT INTERNAL DIMENSION IN SYSTOLE: 3.2 CM (ref 2.1–4)
LEFT VENTRICLE DIASTOLIC VOLUME (MOD BIPLANE): 143 ML
LEFT VENTRICLE SYSTOLIC VOLUME (MOD BIPLANE): 39 ML
LEFT VENTRICULAR INTERNAL DIMENSION IN DIASTOLE: 4.5 CM (ref 3.5–6)
LEFT VENTRICULAR POSTERIOR WALL IN END DIASTOLE: 1.2 CM
LEFT VENTRICULAR STROKE VOLUME: 49 ML
LV EF: 73 %
LVSV (TEICH): 49 ML
MV E'TISSUE VEL-SEP: 10 CM/S
MV PEAK A VEL: 0.79 M/S
MV PEAK E VEL: 67 CM/S
MV STENOSIS PRESSURE HALF TIME: 69 MS
MV VALVE AREA P 1/2 METHOD: 3.19
RIGHT ATRIUM AREA SYSTOLE A4C: 13.8 CM2
RIGHT VENTRICLE ID DIMENSION: 3.5 CM
SL CV LEFT ATRIUM LENGTH A2C: 3.4 CM
SL CV LV EF: 60
SL CV PED ECHO LEFT VENTRICLE DIASTOLIC VOLUME (MOD BIPLANE) 2D: 90 ML
SL CV PED ECHO LEFT VENTRICLE SYSTOLIC VOLUME (MOD BIPLANE) 2D: 41 ML
TR MAX PG: 20 MMHG
TR PEAK VELOCITY: 2.3 M/S
TRICUSPID ANNULAR PLANE SYSTOLIC EXCURSION: 2.4 CM
TRICUSPID VALVE PEAK REGURGITATION VELOCITY: 2.25 M/S

## 2023-07-14 PROCEDURE — 93306 TTE W/DOPPLER COMPLETE: CPT | Performed by: INTERNAL MEDICINE

## 2023-07-14 PROCEDURE — 93306 TTE W/DOPPLER COMPLETE: CPT

## 2023-08-15 ENCOUNTER — APPOINTMENT (OUTPATIENT)
Dept: RADIOLOGY | Facility: CLINIC | Age: 68
End: 2023-08-15
Payer: COMMERCIAL

## 2023-08-15 ENCOUNTER — OFFICE VISIT (OUTPATIENT)
Dept: OBGYN CLINIC | Facility: CLINIC | Age: 68
End: 2023-08-15
Payer: COMMERCIAL

## 2023-08-15 VITALS
HEIGHT: 67 IN | SYSTOLIC BLOOD PRESSURE: 120 MMHG | WEIGHT: 220 LBS | BODY MASS INDEX: 34.53 KG/M2 | DIASTOLIC BLOOD PRESSURE: 70 MMHG

## 2023-08-15 DIAGNOSIS — M75.101 TEAR OF RIGHT ROTATOR CUFF, UNSPECIFIED TEAR EXTENT, UNSPECIFIED WHETHER TRAUMATIC: Primary | ICD-10-CM

## 2023-08-15 DIAGNOSIS — M25.511 ACUTE PAIN OF RIGHT SHOULDER: ICD-10-CM

## 2023-08-15 PROCEDURE — 73030 X-RAY EXAM OF SHOULDER: CPT

## 2023-08-15 PROCEDURE — 99213 OFFICE O/P EST LOW 20 MIN: CPT | Performed by: STUDENT IN AN ORGANIZED HEALTH CARE EDUCATION/TRAINING PROGRAM

## 2023-08-15 NOTE — PROGRESS NOTES
Ortho Sports Medicine Shoulder New Patient Visit     Assesment:   76 y.o. male right shoulder chronic rotator cuff tear    Plan:  The patient's diagnosis and treatment were discussed at length today. We discussed no treatment, non-operative treatment, and operative treatment. [facility] presents today for initial evaluation right shoulder chronic rotator cuff tear. I did review his x-rays that were obtained at today's visit which demonstrate superior migration indicative of chronic rotator cuff pathology. I discussed with him that given his excellent response to physical therapy and cortisone injection for a similar issue with his left shoulder I did recommend that we attempt this for his right shoulder. He mentions that he is not in any significant pain at today's visit and would like to hold off on a cortisone injection at this point time. He also mentions that he is currently in outpatient physical therapy for his left shoulder and will incorporate the exercises for his right, so no referral to outpatient physical therapy was provided at today's visit. I discussed with him that if at any point time he wishes to attempt a cortisone injection for his right shoulder he can schedule a follow-up visit. He is able to continue to perform activity as tolerated, however repetitive overhead motion and reaching behind his back may exacerbate his symptoms. He is able to manage his pain with ice, heat, over-the-counter medication, and topical Voltaren gel. He is to follow-up on an as-needed basis in regards to his right shoulder. Conservative treatment:    Ice to shoulder 1-2 times daily, for 20 minutes at a time. PT for ROM and strengthening to shoulder, rotator cuff, scapular stabilizers. Home exercise program for shoulder, including ROM and strenthening. Instructions given to patient of what exercises to perform. Let pain guide return to activities. Imaging:     All imaging from today was reviewed by myself and explained to the patient. Injection:    No Injection planned at this time. Surgery:     No surgery is recommended at this point, continue with conservative treatment plan as noted. Follow up:    Return if symptoms worsen or fail to improve. Chief Complaint   Patient presents with   • Right Shoulder - Pain       History of Present Illness: The patient is a 76 y.o., right hand dominant male whose occupation is retired, referred to me by their primary care physician, seen in clinic for evaluation of right shoulder pain. He states that he has been having ongoing right shoulder pain now for several months without any specific injury or trauma. He states that the pain is predominantly on the anterior lateral aspect of his shoulder, but does not radiate past his elbow. He rates his pain a 4 out of 10 that he describes as dull and achy. He states that his pain is worse with repetitive overhead motion and reaching behind his back and better with rest.  He does occasionally have frequent pain at night that makes it difficult to sleep. He occasionally also has difficulty with activities of daily living. He mentions that he has been in outpatient physical therapy for his left shoulder which she believes is a similar issue to his right. He states that he was receiving treatment from Dr. Aaron Guido for a rotator cuff tear with physical therapy and cortisone injections that he did respond well to. He denies any previous history of cortisone injections or outpatient physical therapy in regards to his right shoulder. He denies any previous history of injury or trauma to his shoulder. He denies any numbness or tingling. He is currently attempting manage pain with rest, ice, heat, and over-the-counter medication.       Shoulder Surgical History:  None    Past Medical, Social and Family History:  Past Medical History:   Diagnosis Date   • GERD (gastroesophageal reflux disease)    • Hyperlipidemia    • Hypertension    • Kidney stone    • Meniere disease    • Pulmonary embolism (HCC)      Past Surgical History:   Procedure Laterality Date   • ABDOMINAL SURGERY     • BACK SURGERY     • COCHLEAR IMPLANT Right    • COLONOSCOPY     • CT NEEDLE BIOPSY MEDIASTINUM  5/17/2019   • FL INJECTION LEFT SHOULDER (ARTHROGRAM)  01/19/2022   • HERNIA REPAIR     • IVC FILTER INSERTION       Allergies   Allergen Reactions   • Moxifloxacin Shortness Of Breath and Hives   • Alcohol - Food Allergy      vertigo   • Caffeine - Food Allergy      vertigo   • Cephalosporins      hyperventilation   • Doxycycline    • Penicillins Hives   • Pravastatin      Other reaction(s): Respiratory Distress   • Prednisone Tinnitus     Other reaction(s): Unknown   • Tamiflu [Oseltamivir] Lightheadedness     Hyperventilation     Current Outpatient Medications on File Prior to Visit   Medication Sig Dispense Refill   • chlorthalidone 25 mg tablet TAKE 1 TABLET (25 MG TOTAL) BY MOUTH DAILY. 90 tablet 1   • ezetimibe (ZETIA) 10 mg tablet Take 10 mg by mouth every other day  (Patient not taking: Reported on 10/19/2022)     • Klor-Con M20 20 MEQ tablet TAKE 1 TABLET BY MOUTH 2 TIMES A DAY. 180 tablet 1   • losartan (COZAAR) 50 mg tablet TAKE 1 TABLET BY MOUTH EVERY DAY 90 tablet 1   • Multiple Vitamin (multivitamin) tablet Take 1 tablet by mouth daily     • Nutritional Supplements (THERALITH XR) TABS Take by mouth 2 (two) times a day 2 tablets BID     • omeprazole (PriLOSEC) 20 mg delayed release capsule Take 20 mg by mouth daily       No current facility-administered medications on file prior to visit.      Social History     Socioeconomic History   • Marital status:      Spouse name: Not on file   • Number of children: Not on file   • Years of education: Not on file   • Highest education level: Not on file   Occupational History   • Not on file   Tobacco Use   • Smoking status: Never   • Smokeless tobacco: Never   Vaping Use   • Vaping Use: Never used Substance and Sexual Activity   • Alcohol use: No   • Drug use: No   • Sexual activity: Not on file   Other Topics Concern   • Not on file   Social History Narrative   • Not on file     Social Determinants of Health     Financial Resource Strain: Not on file   Food Insecurity: Not on file   Transportation Needs: Not on file   Physical Activity: Not on file   Stress: Not on file   Social Connections: Not on file   Intimate Partner Violence: Not on file   Housing Stability: Not on file         I have reviewed the past medical, surgical, social and family history, medications and allergies as documented in the EMR. Review of systems: ROS is negative other than that noted in the HPI. Constitutional: Negative for fatigue and fever. HENT: Negative for sore throat. Respiratory: Negative for shortness of breath. Cardiovascular: Negative for chest pain. Gastrointestinal: Negative for abdominal pain. Endocrine: Negative for cold intolerance and heat intolerance. Genitourinary: Negative for flank pain. Musculoskeletal: Negative for back pain. Skin: Negative for rash. Allergic/Immunologic: Negative for immunocompromised state. Neurological: Negative for dizziness. Psychiatric/Behavioral: Negative for agitation. Physical Exam:    Blood pressure 120/70, height 5' 7" (1.702 m), weight 99.8 kg (220 lb). General/Constitutional: NAD, well developed, well nourished  HENT: Normocephalic, atraumatic  CV: Intact distal pulses, regular rate  Resp: No respiratory distress or labored breathing  Lymphatic: No lymphadenopathy palpated  Neuro: Alert and Oriented x 3, no focal deficits  Psych: Normal mood, normal affect, normal judgement, normal behavior  Skin: Warm, dry, no rashes, no erythema      Shoulder focused exam:     Visual inspection of the shoulder demonstrates normal contour without atrophy  No evidence of scapular dyskinesia or atrophy.   No scapular winging  Active and passive range of motion demonstrates forward flexion to 150, abduction to 60, external rotation is  60 with the arm the side, internal rotation to  L1   Rotator cuff strength is 4/5 to resisted forward flexion, 4/5 resisted abduction, 4/5 resisted external rotation, 5/5 resisted internal rotation  No tenderness to palpation at the distal clavicle, AC joint, acromion, or scapular spine  No pain with cross-body adduction  + Neer's test, + modified March impingement test  Negative external rotation lag sign  Negative belly press, negative lift-off  Mildly positive speed's and Yergason's test  Mild tenderness to palpation at the bicipital groove  - [place]'s test        UE NV Exam: +2 Radial pulses bilaterally  Sensation intact to light touch C5-T1 bilaterally, Radial/median/ulnar nerve motor intact      Bilateral elbow, wrist, and and forearm ROM full, painless with passive ROM, no ttp or crepitance throughout extremities below shoulder joint    Cervical ROM is full without pain, numbness or tingling      Shoulder Imaging    X-rays of the right shoulder were reviewed, which demonstrate no acute osseous abnormalities or significant degenerative changes. Positive Gothic arch sign with high riding humeral head indicative of chronic rotator cuff pathology. I have reviewed the radiology report and do not currently have a radiology reading from [facility], but will check the result once the reading is performed.     Scribe Attestation    I,:  Festus Gallegos am acting as a scribe while in the presence of the attending physician.:       I,:  Gely Melgoza, DO personally performed the services described in this documentation    as scribed in my presence.:

## 2023-09-07 DIAGNOSIS — I10 HYPERTENSION, UNSPECIFIED TYPE: ICD-10-CM

## 2023-09-07 DIAGNOSIS — I10 ESSENTIAL (PRIMARY) HYPERTENSION: ICD-10-CM

## 2023-09-07 RX ORDER — LOSARTAN POTASSIUM 50 MG/1
TABLET ORAL
Qty: 90 TABLET | Refills: 0 | Status: SHIPPED | OUTPATIENT
Start: 2023-09-07

## 2023-09-11 ENCOUNTER — OFFICE VISIT (OUTPATIENT)
Dept: URGENT CARE | Facility: CLINIC | Age: 68
End: 2023-09-11
Payer: COMMERCIAL

## 2023-09-11 VITALS
DIASTOLIC BLOOD PRESSURE: 67 MMHG | BODY MASS INDEX: 34.61 KG/M2 | RESPIRATION RATE: 16 BRPM | OXYGEN SATURATION: 94 % | HEART RATE: 70 BPM | SYSTOLIC BLOOD PRESSURE: 140 MMHG | TEMPERATURE: 99.1 F | WEIGHT: 221 LBS

## 2023-09-11 DIAGNOSIS — H11.32 SUBCONJUNCTIVAL HEMATOMA, LEFT: Primary | ICD-10-CM

## 2023-09-11 PROCEDURE — 99213 OFFICE O/P EST LOW 20 MIN: CPT | Performed by: PHYSICIAN ASSISTANT

## 2023-09-11 NOTE — PATIENT INSTRUCTIONS
Subconjunctival Hemorrhage   WHAT YOU NEED TO KNOW:   A subconjunctival hemorrhage is when blood collects under the conjunctiva in your eye. The conjunctiva is the clear lining that covers the white part of your eye. The blood comes from broken blood vessels under the conjunctiva. DISCHARGE INSTRUCTIONS:   Care for your eye:   Cold or warm compress:  Use a cold pack during the first 24 hours. Ask how often to apply it and for how long each time. After the first 24 hours, apply a warm pack on your eye. Do this 3 times each day for about 10 to 15 minutes each time. Eyedrops: You may need artificial tears to keep your eye moist. Use the drops as directed. Follow up with your healthcare provider or eye specialist as directed:  Write down your questions so you remember to ask them during your visits. Contact your healthcare provider or eye specialist if:   The redness in your eye has not gone away after 3 weeks. You have another subconjunctival hemorrhage. You have subconjunctival hemorrhages in both eyes. You have questions or concerns about your condition or care. Return to the emergency department if:   You have eye pain or sensitivity to light. Your vision changes. You have white or yellow discharge from your eye. © Copyright [employer] 2022 Information is for End User's use only and may not be sold, redistributed or otherwise used for commercial purposes. The above information is an  only. It is not intended as medical advice for individual conditions or treatments. Talk to your doctor, nurse or pharmacist before following any medical regimen to see if it is safe and effective for you.

## 2023-09-11 NOTE — PROGRESS NOTES
[facility]        NAME: George Ellis. is a 76 y.o. male  : 1955    MRN: [id number]  DATE: 2023  TIME: 12:23 PM    Assessment and Plan   Subconjunctival hematoma, left [H11.32]  1. Subconjunctival hematoma, left              Patient Instructions     Reassurance and education given. No treatment necessary. Follow up with PCP in 3-5 days. Proceed to  ER if symptoms worsen. Chief Complaint     Chief Complaint   Patient presents with   • Eye Problem     Pt states the eye redness started on Saturday. Reports mild discomfort and intermittent itching. No known injury. History of Present Illness     HPI   Patient is a 77 yo male who presents with redness of inner L eye that his family member noticed yesterday. No pain, no concern for FB, no injury. No change in vision. He has been painting and does not feel he has lifted anything strenuous. He does have allergies, often sneezes three times. He sometimes takes otc eye drops when his eyes itch with allergies but this is not new. No dry eye. Review of Systems   Review of Systems   Constitutional: Negative. HENT: Negative. Eyes: Positive for redness. Negative for photophobia, pain, discharge, itching and visual disturbance. Allergic/Immunologic: Positive for environmental allergies. Current Medications       Current Outpatient Medications:   •  chlorthalidone 25 mg tablet, TAKE 1 TABLET (25 MG TOTAL) BY MOUTH DAILY. , Disp: 90 tablet, Rfl: 1  •  Klor-Con M20 20 MEQ tablet, TAKE 1 TABLET BY MOUTH 2 TIMES A DAY., Disp: 180 tablet, Rfl: 1  •  losartan (COZAAR) 50 mg tablet, TAKE 1 TABLET BY MOUTH EVERY DAY, Disp: 90 tablet, Rfl: 0  •  Multiple Vitamin (multivitamin) tablet, Take 1 tablet by mouth daily, Disp: , Rfl:   •  Nutritional Supplements (THERALITH XR) TABS, Take by mouth 2 (two) times a day 2 tablets BID, Disp: , Rfl:   •  omeprazole (PriLOSEC) 20 mg delayed release capsule, Take 20 mg by mouth daily, Disp: , Rfl:   •  ezetimibe (ZETIA) 10 mg tablet, Take 10 mg by mouth every other day  (Patient not taking: Reported on 10/19/2022), Disp: , Rfl:     Current Allergies     Allergies as of 09/11/2023 - Reviewed 09/11/2023   Allergen Reaction Noted   • Moxifloxacin Shortness Of Breath and Hives 10/16/2013   • Alcohol - food allergy  02/08/2017   • Caffeine - food allergy  05/27/2016   • Cephalosporins  05/27/2016   • Doxycycline     • Penicillins Hives 07/30/2015   • Pravastatin     • Prednisone Tinnitus 11/17/2017   • Tamiflu [oseltamivir] Lightheadedness 04/04/2018            The following portions of the patient's history were reviewed and updated as appropriate: allergies, current medications, past family history, past medical history, past social history, past surgical history and problem list.     Past Medical History:   Diagnosis Date   • GERD (gastroesophageal reflux disease)    • Hyperlipidemia    • Hypertension    • Kidney stone    • Meniere disease    • Pulmonary embolism ([address])        Past Surgical History:   Procedure Laterality Date   • ABDOMINAL SURGERY     • BACK SURGERY     • COCHLEAR IMPLANT Right    • COLONOSCOPY     • CT NEEDLE BIOPSY MEDIASTINUM  5/17/2019   • FL INJECTION LEFT SHOULDER (ARTHROGRAM)  01/19/2022   • HERNIA REPAIR     • IVC FILTER INSERTION         Family History   Problem Relation Age of Onset   • Hypertension Mother    • Hyperlipidemia Mother    • Hypertension Father    • Hyperlipidemia Father          Medications have been verified. Objective   /67   Pulse 70   Temp 99.1 °F (37.3 °C)   Resp 16   Wt 100 kg (221 lb)   SpO2 94%   BMI 34.61 kg/m²   No LMP for male patient. Physical Exam     Physical Exam  Constitutional:       General: He is not in acute distress. Appearance: Normal appearance. HENT:      Head: Normocephalic and atraumatic.       Right Ear: External ear normal.      Left Ear: External ear normal.      Nose: Nose normal.      Mouth/Throat:      Mouth: Mucous membranes are moist.   Eyes:      General: Lids are normal.         Right eye: No foreign body or discharge. Left eye: No foreign body or discharge. Extraocular Movements:      Right eye: Normal extraocular motion. Left eye: Normal extraocular motion. Conjunctiva/sclera:      Right eye: No exudate or hemorrhage. Left eye: Hemorrhage (L inner sclera) present. No exudate. Musculoskeletal:      Cervical back: Normal range of motion. Skin:     General: Skin is warm and dry. Findings: No erythema or rash. Neurological:      General: No focal deficit present. Mental Status: He is alert and oriented to person, place, and time.    Psychiatric:         Mood and Affect: Mood normal.

## 2023-11-19 DIAGNOSIS — I10 HYPERTENSION, UNSPECIFIED TYPE: ICD-10-CM

## 2023-11-19 DIAGNOSIS — E87.6 HYPOKALEMIA: ICD-10-CM

## 2023-11-20 RX ORDER — POTASSIUM CHLORIDE 1500 MG/1
20 TABLET, EXTENDED RELEASE ORAL 2 TIMES DAILY
Qty: 180 TABLET | Refills: 1 | Status: SHIPPED | OUTPATIENT
Start: 2023-11-20

## 2023-11-20 RX ORDER — CHLORTHALIDONE 25 MG/1
25 TABLET ORAL DAILY
Qty: 90 TABLET | Refills: 1 | Status: SHIPPED | OUTPATIENT
Start: 2023-11-20

## 2023-11-28 ENCOUNTER — OFFICE VISIT (OUTPATIENT)
Dept: URGENT CARE | Facility: CLINIC | Age: 68
End: 2023-11-28
Payer: COMMERCIAL

## 2023-11-28 VITALS
DIASTOLIC BLOOD PRESSURE: 70 MMHG | RESPIRATION RATE: 18 BRPM | HEART RATE: 91 BPM | SYSTOLIC BLOOD PRESSURE: 138 MMHG | TEMPERATURE: 99.2 F | OXYGEN SATURATION: 97 %

## 2023-11-28 DIAGNOSIS — B34.9 VIRAL INFECTION: Primary | ICD-10-CM

## 2023-11-28 PROCEDURE — 99213 OFFICE O/P EST LOW 20 MIN: CPT | Performed by: FAMILY MEDICINE

## 2023-11-28 RX ORDER — CYANOCOBALAMIN (VITAMIN B-12) 500 MCG
2 TABLET ORAL DAILY
COMMUNITY

## 2023-11-28 NOTE — PROGRESS NOTES
[facility]        NAME: [place] . is a 76 y.o. male  : 1955    MRN: [id number]  DATE: 2023  TIME: 6:32 PM    Assessment and Plan   Viral infection [B34.9]  1. Viral infection              Patient Instructions       Follow up with PCP in 3-5 days. Proceed to  ER if symptoms worsen. Chief Complaint     Chief Complaint   Patient presents with    Fever     Pt presents with fever (100). No other symptoms. History of Present Illness       80-year-old male presenting at this time for a fever for the past 1 hour. He states after returning home from a hunting trip for the past 3 days, developing a fever of 101. Denies any headaches, body aches or sore throat. Denies any cough or congestion. Review of Systems   Review of Systems   Constitutional:  Positive for fatigue and fever. HENT: Negative. Eyes: Negative. Respiratory: Negative. Cardiovascular: Negative. Gastrointestinal: Negative. Genitourinary: Negative. Musculoskeletal:  Negative for arthralgias and myalgias. Skin: Negative. Allergic/Immunologic: Negative. Neurological: Negative. Hematological: Negative. Psychiatric/Behavioral: Negative. Current Medications       Current Outpatient Medications:     chlorthalidone 25 mg tablet, TAKE 1 TABLET (25 MG TOTAL) BY MOUTH DAILY. , Disp: 90 tablet, Rfl: 1    Klor-Con M20 20 MEQ tablet, TAKE 1 TABLET BY MOUTH TWICE A DAY, Disp: 180 tablet, Rfl: 1    losartan (COZAAR) 50 mg tablet, TAKE 1 TABLET BY MOUTH EVERY DAY, Disp: 90 tablet, Rfl: 0    Multiple Vitamin (multivitamin) tablet, Take 1 tablet by mouth daily, Disp: , Rfl:     Nutritional Supplements (THERALITH XR) TABS, Take by mouth 2 (two) times a day 2 tablets BID, Disp: , Rfl:     Omega-3 Fatty Acids (Fish Oil) 300 MG CAPS, Take 2 g by mouth daily, Disp: , Rfl:     omeprazole (PriLOSEC) 20 mg delayed release capsule, Take 20 mg by mouth daily, Disp: , Rfl:     ezetimibe (ZETIA) 10 mg tablet, Take 10 mg by mouth every other day  (Patient not taking: Reported on 10/19/2022), Disp: , Rfl:     Current Allergies     Allergies as of 11/28/2023 - Reviewed 11/28/2023   Allergen Reaction Noted    Moxifloxacin Shortness Of Breath and Hives 10/16/2013    Alcohol - food allergy  02/08/2017    Caffeine - food allergy  05/27/2016    Cephalosporins  05/27/2016    Doxycycline      Penicillins Hives 07/30/2015    Pravastatin      Prednisone Tinnitus 11/17/2017    Tamiflu [oseltamivir] Lightheadedness 04/04/2018    Claritin [loratadine] Anxiety 11/28/2023            The following portions of the patient's history were reviewed and updated as appropriate: allergies, current medications, past family history, past medical history, past social history, past surgical history and problem list.     Past Medical History:   Diagnosis Date    GERD (gastroesophageal reflux disease)     Hyperlipidemia     Hypertension     Kidney stone     Meniere disease     Pulmonary embolism (HCC)        Past Surgical History:   Procedure Laterality Date    ABDOMINAL SURGERY      BACK SURGERY      COCHLEAR IMPLANT Right     COLONOSCOPY      CT NEEDLE BIOPSY MEDIASTINUM  5/17/2019    FL INJECTION LEFT SHOULDER (ARTHROGRAM)  01/19/2022    HERNIA REPAIR      IVC FILTER INSERTION         Family History   Problem Relation Age of Onset    Hypertension Mother     Hyperlipidemia Mother     Hypertension Father     Hyperlipidemia Father          Medications have been verified. Objective   /70   Pulse 91   Temp 99.2 °F (37.3 °C)   Resp 18   SpO2 97%   No LMP for male patient. Physical Exam     Physical Exam  Constitutional:       Appearance: He is well-developed. HENT:      Head: Normocephalic. Nose: Nose normal.   Eyes:      Pupils: Pupils are equal, round, and reactive to light. Cardiovascular:      Rate and Rhythm: Normal rate.    Pulmonary:      Effort: Pulmonary effort is normal.   Musculoskeletal: General: Normal range of motion. Cervical back: Normal range of motion. Skin:     General: Skin is warm. Neurological:      Mental Status: He is alert and oriented to person, place, and time.

## 2023-11-30 ENCOUNTER — OFFICE VISIT (OUTPATIENT)
Dept: URGENT CARE | Facility: CLINIC | Age: 68
End: 2023-11-30
Payer: COMMERCIAL

## 2023-11-30 VITALS
RESPIRATION RATE: 18 BRPM | SYSTOLIC BLOOD PRESSURE: 139 MMHG | DIASTOLIC BLOOD PRESSURE: 76 MMHG | HEART RATE: 92 BPM | BODY MASS INDEX: 34.93 KG/M2 | OXYGEN SATURATION: 96 % | TEMPERATURE: 100.3 F | WEIGHT: 223 LBS

## 2023-11-30 DIAGNOSIS — R50.9 FEVER, UNSPECIFIED FEVER CAUSE: ICD-10-CM

## 2023-11-30 DIAGNOSIS — U07.1 COVID-19: Primary | ICD-10-CM

## 2023-11-30 LAB
S PYO AG THROAT QL: NEGATIVE
SARS-COV-2 AG UPPER RESP QL IA: POSITIVE
VALID CONTROL: ABNORMAL

## 2023-11-30 PROCEDURE — 99213 OFFICE O/P EST LOW 20 MIN: CPT

## 2023-11-30 PROCEDURE — 87811 SARS-COV-2 COVID19 W/OPTIC: CPT

## 2023-11-30 PROCEDURE — 87880 STREP A ASSAY W/OPTIC: CPT

## 2023-11-30 RX ORDER — ACETAMINOPHEN 325 MG/1
650 TABLET ORAL ONCE
Status: COMPLETED | OUTPATIENT
Start: 2023-11-30 | End: 2023-11-30

## 2023-11-30 RX ADMIN — ACETAMINOPHEN 650 MG: 325 TABLET ORAL at 09:37

## 2023-11-30 NOTE — LETTER
November 30, 2023     Patient: Ross Vázquez. YOB: 1955   Date of Visit: 11/30/2023       To Whom it May Concern:    Dana Allen was seen in my clinic on 11/30/2023. He may return to work on 12/4/2023 . If you have any questions or concerns, please don't hesitate to call.          Sincerely,          MAGDALENA Hope        CC: No Recipients

## 2023-11-30 NOTE — PATIENT INSTRUCTIONS
Rapid COVID swab collected today and is positive. Quarantine guidelines discussed. You were given a dose of Tylenol/acetaminophen here in the clinic at 9:30AM.     Based off CDC guidelines, recommend you isolate for 5 days. If you are asymptomatic or symptoms are improving with no fevers in the past 24 hours, isolation may be ended followed by 5 days of wearing a mask when around othes to minimize risk of infecting others. If still have a fever or other symptoms have not improved, continue to isolate until you improve. Regardless of when you end isolation, avoid being around people who are more likely to get very sick from COVID-19 until at least day 11. OTC supplements (Vitamin C and Zinc) and over the counter medications discussed. For nasal/sinus congestion you can try steam, warm compresses, saline nasal spray, Neti pot, nasal steroid (Flonase, Nasocort), or nasal decongestant (Afrin - for 3 days only). You can try a decongestant (Sudafed) if > 10years of age and no history of high blood pressure. For cough you can take an over-the-counter expectorant such as plain Robitussion or Mucinex. A spoonful of honey at bedtime may also be helpful. For cold symptoms with high blood pressure take Coricidin cough/cold. For sore throat you can use Cepacol lozenges, do warm salt water gargles, drink warm water with lemon or herbal teas, or use an over-the-counter throat spray (Chloraseptic). You can take ibuprofen/Motrin and acetaminophen/Tylenol as needed for pain, fever, body aches. Do not take ibuprofen/Motrin/Advil if you have a history of heart disease, bleeding ulcers, or if you take blood thinners. Drink plenty of fluids to stay hydrated. Follow up with your PCP in the next 1-2 days for re-evaluation if symptoms continue or worsen. Reach out to your PCP if interested in starting Paxlovid.      Go to the ED if any fevers, abdominal pain, chest pain, shortness of breath, wheezing, chest tightness, vomiting/diarrhea/unable to stay hydrated, new or worsening symptoms.

## 2023-11-30 NOTE — PROGRESS NOTES
[facility] Now        NAME: Erika Phelan. is a 76 y.o. male  : 1955    MRN: [id number]  DATE: 2023  TIME: 10:04 AM    Assessment and Plan   COVID-19 [U07.1]  1. COVID-19        2. Fever, unspecified fever cause  Poct Covid 19 Rapid Antigen Test    POCT rapid strepA    acetaminophen (TYLENOL) tablet 650 mg            Patient Instructions     Rapid COVID swab collected today and is positive. Quarantine guidelines discussed. You were given a dose of Tylenol/acetaminophen here in the clinic at 9:30AM.     Based off CDC guidelines, recommend you isolate for 5 days. If you are asymptomatic or symptoms are improving with no fevers in the past 24 hours, isolation may be ended followed by 5 days of wearing a mask when around othes to minimize risk of infecting others. If still have a fever or other symptoms have not improved, continue to isolate until you improve. Regardless of when you end isolation, avoid being around people who are more likely to get very sick from COVID-19 until at least day 11. OTC supplements (Vitamin C and Zinc) and over the counter medications discussed. For nasal/sinus congestion you can try steam, warm compresses, saline nasal spray, Neti pot, nasal steroid (Flonase, Nasocort), or nasal decongestant (Afrin - for 3 days only). You can try a decongestant (Sudafed) if > 10years of age and no history of high blood pressure. For cough you can take an over-the-counter expectorant such as plain Robitussion or Mucinex. A spoonful of honey at bedtime may also be helpful. For cold symptoms with high blood pressure take Coricidin cough/cold. For sore throat you can use Cepacol lozenges, do warm salt water gargles, drink warm water with lemon or herbal teas, or use an over-the-counter throat spray (Chloraseptic). You can take ibuprofen/Motrin and acetaminophen/Tylenol as needed for pain, fever, body aches.  Do not take ibuprofen/Motrin/Advil if you have a history of heart disease, bleeding ulcers, or if you take blood thinners. Drink plenty of fluids to stay hydrated. Follow up with your PCP in the next 1-2 days for re-evaluation if symptoms continue or worsen. Reach out to your PCP if interested in starting Paxlovid. Go to the ED if any fevers, abdominal pain, chest pain, shortness of breath, wheezing, chest tightness, vomiting/diarrhea/unable to stay hydrated, new or worsening symptoms. Chief Complaint     Chief Complaint   Patient presents with    Sore Throat    Fever     Patient states that fever 3 days ago and sore throat started this morning. Was here 2 days ago for the same thing and not improving. History of Present Illness       75-year-old male presenting with sore throat that started this morning. Also with fevers Tmax 101 x 3 days. Notes mild nasal congestion and rhinorrhea. Denies headaches, cough, CP/SOB, chest tightness, and N/V/D. Taking ibuprofen for fever control. Possible sick contacts, other individuals on recent hunting trip who were sick with similar symptoms. Review of Systems   Review of Systems   Constitutional:  Positive for chills, fatigue and fever. HENT:  Positive for congestion, rhinorrhea and sore throat. Eyes:  Negative for discharge and redness. Respiratory:  Negative for cough, chest tightness, shortness of breath and wheezing. Gastrointestinal:  Negative for abdominal pain, diarrhea, nausea and vomiting. Skin:  Negative for pallor and rash. Neurological:  Negative for dizziness, light-headedness and headaches. Current Medications       Current Outpatient Medications:     chlorthalidone 25 mg tablet, TAKE 1 TABLET (25 MG TOTAL) BY MOUTH DAILY. , Disp: 90 tablet, Rfl: 1    ezetimibe (ZETIA) 10 mg tablet, Take 10 mg by mouth every other day  (Patient not taking: Reported on 10/19/2022), Disp: , Rfl:     Klor-Con M20 20 MEQ tablet, TAKE 1 TABLET BY MOUTH TWICE A DAY, Disp: 180 tablet, Rfl: 1    losartan (COZAAR) 50 mg tablet, TAKE 1 TABLET BY MOUTH EVERY DAY, Disp: 90 tablet, Rfl: 0    Multiple Vitamin (multivitamin) tablet, Take 1 tablet by mouth daily, Disp: , Rfl:     Nutritional Supplements (THERALITH XR) TABS, Take by mouth 2 (two) times a day 2 tablets BID, Disp: , Rfl:     Omega-3 Fatty Acids (Fish Oil) 300 MG CAPS, Take 2 g by mouth daily, Disp: , Rfl:     omeprazole (PriLOSEC) 20 mg delayed release capsule, Take 20 mg by mouth daily, Disp: , Rfl:   No current facility-administered medications for this visit. Current Allergies     Allergies as of 11/30/2023 - Reviewed 11/30/2023   Allergen Reaction Noted    Moxifloxacin Shortness Of Breath and Hives 10/16/2013    Alcohol - food allergy  02/08/2017    Caffeine - food allergy  05/27/2016    Cephalosporins  05/27/2016    Doxycycline      Penicillins Hives 07/30/2015    Pravastatin      Prednisone Tinnitus 11/17/2017    Tamiflu [oseltamivir] Lightheadedness 04/04/2018    Claritin [loratadine] Anxiety 11/28/2023            The following portions of the patient's history were reviewed and updated as appropriate: allergies, current medications, past family history, past medical history, past social history, past surgical history and problem list.     Past Medical History:   Diagnosis Date    GERD (gastroesophageal reflux disease)     Hyperlipidemia     Hypertension     Kidney stone     Meniere disease     Pulmonary embolism (HCC)        Past Surgical History:   Procedure Laterality Date    ABDOMINAL SURGERY      BACK SURGERY      COCHLEAR IMPLANT Right     COLONOSCOPY      CT NEEDLE BIOPSY MEDIASTINUM  5/17/2019    FL INJECTION LEFT SHOULDER (ARTHROGRAM)  01/19/2022    HERNIA REPAIR      IVC FILTER INSERTION         Family History   Problem Relation Age of Onset    Hypertension Mother     Hyperlipidemia Mother     Hypertension Father     Hyperlipidemia Father          Medications have been verified.         Objective   /76 Pulse 92   Temp 100.3 °F (37.9 °C) (Tympanic)   Resp 18   Wt 101 kg (223 lb)   SpO2 96%   BMI 34.93 kg/m²        Physical Exam     Physical Exam  Vitals and nursing note reviewed. Constitutional:       General: He is not in acute distress. Appearance: He is not ill-appearing or diaphoretic. HENT:      Head: Normocephalic. Right Ear: External ear normal.      Left Ear: External ear normal.      Nose: Nose normal.      Mouth/Throat:      Mouth: Mucous membranes are moist.      Pharynx: Oropharynx is clear. Eyes:      Conjunctiva/sclera: Conjunctivae normal.      Pupils: Pupils are equal, round, and reactive to light. Cardiovascular:      Rate and Rhythm: Normal rate and regular rhythm. Pulses: Normal pulses. Heart sounds: Normal heart sounds. Pulmonary:      Effort: Pulmonary effort is normal.      Breath sounds: Normal breath sounds. Musculoskeletal:         General: Normal range of motion. Cervical back: Normal range of motion and neck supple. Skin:     General: Skin is warm and dry. Capillary Refill: Capillary refill takes less than 2 seconds. Neurological:      Mental Status: He is alert and oriented to person, place, and time.

## 2023-12-02 ENCOUNTER — HOSPITAL ENCOUNTER (EMERGENCY)
Facility: HOSPITAL | Age: 68
Discharge: HOME | End: 2023-12-02
Attending: EMERGENCY MEDICINE
Payer: COMMERCIAL

## 2023-12-02 ENCOUNTER — APPOINTMENT (EMERGENCY)
Dept: RADIOLOGY | Facility: HOSPITAL | Age: 68
End: 2023-12-02
Payer: COMMERCIAL

## 2023-12-02 ENCOUNTER — APPOINTMENT (EMERGENCY)
Dept: CT IMAGING | Facility: HOSPITAL | Age: 68
End: 2023-12-02
Payer: COMMERCIAL

## 2023-12-02 VITALS
OXYGEN SATURATION: 94 % | SYSTOLIC BLOOD PRESSURE: 145 MMHG | DIASTOLIC BLOOD PRESSURE: 74 MMHG | BODY MASS INDEX: 34.93 KG/M2 | RESPIRATION RATE: 16 BRPM | HEART RATE: 61 BPM | TEMPERATURE: 98.9 F | HEIGHT: 67 IN

## 2023-12-02 DIAGNOSIS — U07.1 COVID-19: Primary | ICD-10-CM

## 2023-12-02 DIAGNOSIS — J90 PLEURAL EFFUSION, BILATERAL: ICD-10-CM

## 2023-12-02 DIAGNOSIS — S22.32XA FRACTURE OF ONE RIB, LEFT SIDE, INITIAL ENCOUNTER FOR CLOSED FRACTURE: ICD-10-CM

## 2023-12-02 LAB
2HR DELTA HS TROPONIN: 0 NG/L
ALBUMIN SERPL BCP-MCNC: 3.6 G/DL (ref 3.5–5)
ALP SERPL-CCNC: 71 U/L (ref 34–104)
ALT SERPL W P-5'-P-CCNC: 26 U/L (ref 7–52)
ANION GAP SERPL CALCULATED.3IONS-SCNC: 10 MMOL/L
AST SERPL W P-5'-P-CCNC: 25 U/L (ref 13–39)
ATRIAL RATE: 79 BPM
BASOPHILS # BLD AUTO: 0.02 THOUSANDS/ÂΜL (ref 0–0.1)
BASOPHILS NFR BLD AUTO: 1 % (ref 0–1)
BILIRUB SERPL-MCNC: 0.81 MG/DL (ref 0.2–1)
BNP SERPL-MCNC: 26 PG/ML (ref 0–100)
BUN SERPL-MCNC: 12 MG/DL (ref 5–25)
CALCIUM SERPL-MCNC: 9 MG/DL (ref 8.4–10.2)
CARDIAC TROPONIN I PNL SERPL HS: 7 NG/L
CARDIAC TROPONIN I PNL SERPL HS: 7 NG/L
CHLORIDE SERPL-SCNC: 101 MMOL/L (ref 96–108)
CO2 SERPL-SCNC: 26 MMOL/L (ref 21–32)
CREAT SERPL-MCNC: 1 MG/DL (ref 0.6–1.3)
D DIMER PPP FEU-MCNC: 1.24 UG/ML FEU
EOSINOPHIL # BLD AUTO: 0.08 THOUSAND/ÂΜL (ref 0–0.61)
EOSINOPHIL NFR BLD AUTO: 2 % (ref 0–6)
ERYTHROCYTE [DISTWIDTH] IN BLOOD BY AUTOMATED COUNT: 13.4 % (ref 11.6–15.1)
GFR SERPL CREATININE-BSD FRML MDRD: 76 ML/MIN/1.73SQ M
GLUCOSE SERPL-MCNC: 114 MG/DL (ref 65–140)
HCT VFR BLD AUTO: 42.9 % (ref 36.5–49.3)
HGB BLD-MCNC: 14.8 G/DL (ref 12–17)
IMM GRANULOCYTES # BLD AUTO: 0.02 THOUSAND/UL (ref 0–0.2)
IMM GRANULOCYTES NFR BLD AUTO: 1 % (ref 0–2)
LYMPHOCYTES # BLD AUTO: 1.24 THOUSANDS/ÂΜL (ref 0.6–4.47)
LYMPHOCYTES NFR BLD AUTO: 36 % (ref 14–44)
MCH RBC QN AUTO: 31.6 PG (ref 26.8–34.3)
MCHC RBC AUTO-ENTMCNC: 34.5 G/DL (ref 31.4–37.4)
MCV RBC AUTO: 92 FL (ref 82–98)
MONOCYTES # BLD AUTO: 0.33 THOUSAND/ÂΜL (ref 0.17–1.22)
MONOCYTES NFR BLD AUTO: 10 % (ref 4–12)
NEUTROPHILS # BLD AUTO: 1.8 THOUSANDS/ÂΜL (ref 1.85–7.62)
NEUTS SEG NFR BLD AUTO: 50 % (ref 43–75)
NRBC BLD AUTO-RTO: 0 /100 WBCS
P AXIS: 31 DEGREES
PLATELET # BLD AUTO: 167 THOUSANDS/UL (ref 149–390)
PMV BLD AUTO: 10.2 FL (ref 8.9–12.7)
POTASSIUM SERPL-SCNC: 3.3 MMOL/L (ref 3.5–5.3)
PR INTERVAL: 142 MS
PROT SERPL-MCNC: 7.1 G/DL (ref 6.4–8.4)
QRS AXIS: 47 DEGREES
QRSD INTERVAL: 82 MS
QT INTERVAL: 390 MS
QTC INTERVAL: 447 MS
RBC # BLD AUTO: 4.69 MILLION/UL (ref 3.88–5.62)
SODIUM SERPL-SCNC: 137 MMOL/L (ref 135–147)
T WAVE AXIS: 58 DEGREES
VENTRICULAR RATE: 79 BPM
WBC # BLD AUTO: 3.49 THOUSAND/UL (ref 4.31–10.16)

## 2023-12-02 PROCEDURE — 85379 FIBRIN DEGRADATION QUANT: CPT | Performed by: EMERGENCY MEDICINE

## 2023-12-02 PROCEDURE — 84484 ASSAY OF TROPONIN QUANT: CPT | Performed by: EMERGENCY MEDICINE

## 2023-12-02 PROCEDURE — 83880 ASSAY OF NATRIURETIC PEPTIDE: CPT | Performed by: EMERGENCY MEDICINE

## 2023-12-02 PROCEDURE — 87040 BLOOD CULTURE FOR BACTERIA: CPT | Performed by: EMERGENCY MEDICINE

## 2023-12-02 PROCEDURE — 36415 COLL VENOUS BLD VENIPUNCTURE: CPT | Performed by: EMERGENCY MEDICINE

## 2023-12-02 PROCEDURE — 93005 ELECTROCARDIOGRAM TRACING: CPT

## 2023-12-02 PROCEDURE — 99285 EMERGENCY DEPT VISIT HI MDM: CPT

## 2023-12-02 PROCEDURE — G1004 CDSM NDSC: HCPCS

## 2023-12-02 PROCEDURE — 85025 COMPLETE CBC W/AUTO DIFF WBC: CPT | Performed by: EMERGENCY MEDICINE

## 2023-12-02 PROCEDURE — 80053 COMPREHEN METABOLIC PANEL: CPT | Performed by: EMERGENCY MEDICINE

## 2023-12-02 PROCEDURE — 71275 CT ANGIOGRAPHY CHEST: CPT

## 2023-12-02 PROCEDURE — 99285 EMERGENCY DEPT VISIT HI MDM: CPT | Performed by: EMERGENCY MEDICINE

## 2023-12-02 PROCEDURE — 71045 X-RAY EXAM CHEST 1 VIEW: CPT

## 2023-12-02 RX ORDER — NIRMATRELVIR AND RITONAVIR 300-100 MG
3 KIT ORAL 2 TIMES DAILY
Qty: 30 TABLET | Refills: 0 | Status: SHIPPED | OUTPATIENT
Start: 2023-12-02 | End: 2023-12-07

## 2023-12-02 RX ORDER — POTASSIUM CHLORIDE 20 MEQ/1
40 TABLET, EXTENDED RELEASE ORAL ONCE
Status: DISCONTINUED | OUTPATIENT
Start: 2023-12-02 | End: 2023-12-02

## 2023-12-02 RX ADMIN — IOHEXOL 100 ML: 350 INJECTION, SOLUTION INTRAVENOUS at 10:22

## 2023-12-02 NOTE — DISCHARGE INSTRUCTIONS
Please make sure that you follow-up with your primary care provider (or the clinic) for further evaluation of the abnormal test results you were informed about during this ER visit. Details can be found through the [employer]. If you have not registered for this application, there is a registration code provided within these discharge instructions. Please Be Courteous:  You have COVID-19. Day 0 is your first day of symptoms. Day 1 is the first full day after your symptoms started. You should isolate yourself away from other through day 5 since this is when you are likely most infectious. This means go home and stay home. In Your Home:  If you live with other people, trying to avoid common spaces and disinfect areas that you come into contact with. Per the CDC's recommendations: Use a separate bedroom and bathroom if feasible. If If other people in your home are concerned they may have covid, isolation should begin even before seeking testing and before test results become available. All household members should start wearing a mask in the home, particularly in shared spaces where appropriate distancing is not possible. Take Care of Yourself:  Schedule a virtual visit with your primary care physician as soon as possible. Try to sleep on your stomach as much as possible. If you have the ability to, take vitamin D3 2000 IU daily, vitamin-C 1000 mg twice a day, a multivitamin daily to help boost your immune system. You should check your temperature twice day. Go to the emergency department for any severe shortness of breath, chest pain or pulse ox less than 90%. Isolation: Everyone, regardless of vaccination status: You may end isolation after day 5 if:  You are fever-free for 24 hours (without the use of fever-reducing medication)    Your symptoms are improving    If you still have fever or your other symptoms have not improved, continue to isolate until they improve.     If you had?moderate illness?(if you experienced shortness of breath or had difficulty breathing), or?severe illness?(you were hospitalized) due to COVID-19, or you have a weakened immune system, you need to isolate through day 10. If you had?severe illness?or have a weakened immune system, consult your doctor before ending isolation. Ending isolation without a viral test may not be an option for you.  [email or web address]

## 2023-12-02 NOTE — ED PROVIDER NOTES
History  Chief Complaint   Patient presents with    Shortness of Breath     Patient presents to the ED with c/o  fever, congestion that he feels in his chest and began having SOB, states hx of PE a few years ago and became concerned. + COVID as of 11/20, symptoms all began on Wednesday      76year-old male presents for evaluation of fevers, shortness of breath and chest tightness. Patient was hunting when his symptoms began. He states that he is concerned due to his history of a pulmonary embolism in the past.  Denies any leg pain. Patient does admit to having a fall recently on the left side. Shortness of Breath      Prior to Admission Medications   Prescriptions Last Dose Informant Patient Reported? Taking? Klor-Con M20 20 MEQ tablet   No No   Sig: TAKE 1 TABLET BY MOUTH TWICE A DAY   Multiple Vitamin (multivitamin) tablet  Self Yes No   Sig: Take 1 tablet by mouth daily   Nutritional Supplements (THERALITH XR) TABS  Self Yes No   Sig: Take by mouth 2 (two) times a day 2 tablets BID   Omega-3 Fatty Acids (Fish Oil) 300 MG CAPS   Yes No   Sig: Take 2 g by mouth daily   chlorthalidone 25 mg tablet   No No   Sig: TAKE 1 TABLET (25 MG TOTAL) BY MOUTH DAILY.    ezetimibe (ZETIA) 10 mg tablet  Self Yes No   Sig: Take 10 mg by mouth every other day    Patient not taking: Reported on 10/19/2022   losartan (COZAAR) 50 mg tablet   No No   Sig: TAKE 1 TABLET BY MOUTH EVERY DAY   omeprazole (PriLOSEC) 20 mg delayed release capsule  Self Yes No   Sig: Take 20 mg by mouth daily      Facility-Administered Medications: None       Past Medical History:   Diagnosis Date    GERD (gastroesophageal reflux disease)     Hyperlipidemia     Hypertension     Kidney stone     Meniere disease     Pulmonary embolism (HCC)     Thoracic aortic aneurysm (HCC)        Past Surgical History:   Procedure Laterality Date    ABDOMINAL SURGERY      BACK SURGERY      COCHLEAR IMPLANT Right     COLONOSCOPY      CT NEEDLE BIOPSY MEDIASTINUM 5/17/2019    FL INJECTION LEFT SHOULDER (ARTHROGRAM)  01/19/2022    HERNIA REPAIR      IVC FILTER INSERTION         Family History   Problem Relation Age of Onset    Hypertension Mother     Hyperlipidemia Mother     Hypertension Father     Hyperlipidemia Father      I have reviewed and agree with the history as documented. E-Cigarette/Vaping    E-Cigarette Use Never User      E-Cigarette/Vaping Substances     Social History     Tobacco Use    Smoking status: Never    Smokeless tobacco: Never   Vaping Use    Vaping Use: Never used   Substance Use Topics    Alcohol use: No    Drug use: No       Review of Systems   Respiratory:  Positive for shortness of breath. Physical Exam  Physical Exam  Vitals and nursing note reviewed. Constitutional:       General: He is not in acute distress. Appearance: He is well-developed. HENT:      Head: Normocephalic and atraumatic. Right Ear: External ear normal.      Left Ear: External ear normal.      Mouth/Throat:      Pharynx: No oropharyngeal exudate. Eyes:      General: No scleral icterus. Pupils: Pupils are equal, round, and reactive to light. Cardiovascular:      Rate and Rhythm: Normal rate and regular rhythm. Heart sounds: Normal heart sounds. Pulmonary:      Effort: Pulmonary effort is normal. No respiratory distress. Breath sounds: Normal breath sounds. Abdominal:      General: Bowel sounds are normal.      Palpations: Abdomen is soft. Tenderness: There is no abdominal tenderness. There is no guarding or rebound. Musculoskeletal:         General: Normal range of motion. Cervical back: Normal range of motion and neck supple. Skin:     General: Skin is warm and dry. Findings: No rash. Neurological:      Mental Status: He is alert and oriented to person, place, and time.          Vital Signs  ED Triage Vitals [12/02/23 0810]   Temperature Pulse Respirations Blood Pressure SpO2   98.9 °F (37.2 °C) 69 18 145/85 95 % Temp Source Heart Rate Source Patient Position - Orthostatic VS BP Location FiO2 (%)   Oral Monitor Sitting Left arm --      Pain Score       No Pain           Vitals:    12/02/23 0900 12/02/23 0930 12/02/23 1000 12/02/23 1130   BP: 123/69 124/67 123/73 145/74   Pulse: 65 64 64 61   Patient Position - Orthostatic VS:    Sitting         Visual Acuity      ED Medications  Medications   iohexol (OMNIPAQUE) 350 MG/ML injection (SINGLE-DOSE) 100 mL (100 mL Intravenous Given 12/2/23 1022)       Diagnostic Studies  Results Reviewed       Procedure Component Value Units Date/Time    HS Troponin I 2hr [[id number]]  (Normal) Collected: 12/02/23 1036    Lab Status: Final result Specimen: Blood from Hand, Left Updated: 12/02/23 1103     hs TnI 2hr 7 ng/L      Delta 2hr hsTnI 0 ng/L     D-Dimer [[id number]]  (Abnormal) Collected: 12/02/23 0823    Lab Status: Final result Specimen: Blood from Arm, Left Updated: 12/02/23 0935     D-Dimer, Quant 1.24 ug/ml FEU     Narrative: In the evaluation for possible pulmonary embolism, in the appropriate (Well's Score of 4 or less) patient, the age adjusted d-dimer cutoff for this patient can be calculated as:    Age x 0.01 (in ug/mL) for Age-adjusted D-dimer exclusion threshold for a patient over 50 years.     B-Type Natriuretic Peptide(BNP) [[id number]]  (Normal) Collected: 12/02/23 0823    Lab Status: Final result Specimen: Blood from Arm, Left Updated: 12/02/23 0906     BNP 26 pg/mL     HS Troponin 0hr (reflex protocol) [[id number]]  (Normal) Collected: 12/02/23 0823    Lab Status: Final result Specimen: Blood from Arm, Left Updated: 12/02/23 0853     hs TnI 0hr 7 ng/L     Comprehensive metabolic panel [[id number]]  (Abnormal) Collected: 12/02/23 0823    Lab Status: Final result Specimen: Blood from Arm, Left Updated: 12/02/23 0849     Sodium 137 mmol/L      Potassium 3.3 mmol/L      Chloride 101 mmol/L      CO2 26 mmol/L      ANION GAP 10 mmol/L      BUN 12 mg/dL      Creatinine 1.00 mg/dL      Glucose 114 mg/dL      Calcium 9.0 mg/dL      AST 25 U/L      ALT 26 U/L      Alkaline Phosphatase 71 U/L      Total Protein 7.1 g/dL      Albumin 3.6 g/dL      Total Bilirubin 0.81 mg/dL      eGFR 76 ml/min/1.73sq m     Narrative:      [facility] guidelines for Chronic Kidney Disease (CKD):     Stage 1 with normal or high GFR (GFR > 90 mL/min/1.73 square meters)    Stage 2 Mild CKD (GFR = 60-89 mL/min/1.73 square meters)    Stage 3A Moderate CKD (GFR = 45-59 mL/min/1.73 square meters)    Stage 3B Moderate CKD (GFR = 30-44 mL/min/1.73 square meters)    Stage 4 Severe CKD (GFR = 15-29 mL/min/1.73 square meters)    Stage 5 End Stage CKD (GFR <15 mL/min/1.73 square meters)  Note: GFR calculation is accurate only with a steady state creatinine    CBC and differential [[id number]]  (Abnormal) Collected: 12/02/23 0823    Lab Status: Final result Specimen: Blood from Arm, Left Updated: 12/02/23 0831     WBC 3.49 Thousand/uL      RBC 4.69 Million/uL      Hemoglobin 14.8 g/dL      Hematocrit 42.9 %      MCV 92 fL      MCH 31.6 pg      MCHC 34.5 g/dL      RDW 13.4 %      MPV 10.2 fL      Platelets 139 Thousands/uL      nRBC 0 /100 WBCs      Neutrophils Relative 50 %      Immat GRANS % 1 %      Lymphocytes Relative 36 %      Monocytes Relative 10 %      Eosinophils Relative 2 %      Basophils Relative 1 %      Neutrophils Absolute 1.80 Thousands/µL      Immature Grans Absolute 0.02 Thousand/uL      Lymphocytes Absolute 1.24 Thousands/µL      Monocytes Absolute 0.33 Thousand/µL      Eosinophils Absolute 0.08 Thousand/µL      Basophils Absolute 0.02 Thousands/µL     Blood culture #1 [[id number]] Collected: 12/02/23 0823    Lab Status: In process Specimen: Blood from Hand, Left Updated: 12/02/23 0828    Blood culture #2 [[id number]] Collected: 12/02/23 0823    Lab Status:  In process Specimen: Blood from Arm, Left Updated: 12/02/23 1880                   CTA ED chest PE study   Final Result by Maeve Gee Patrick Salazar MD (12/02 1140)      No pulmonary embolus. Trace bilateral lower lobe dependent subsegmental atelectasis and bilateral pleural effusion. Acute to subacute nondisplaced fracture of the left anterior sixth rib. This study demonstrates a significant  finding and was documented as such in [facility] for liaison and referring practitioner notification. Workstation performed: [id number]         XR chest 1 view portable   ED Interpretation by Kathy Cason DO (12/02 1090)   No acute cardiopulmonary process, appears unchanged when compared to previous                 Procedures  ECG 12 Lead Documentation Only    Date/Time: 12/2/2023 8:36 AM    Performed by: Kathy Cason DO  Authorized by: Kathy Cason DO    Indications / Diagnosis:  SOB  ECG reviewed by me, the ED Provider: yes    Patient location:  ED  Previous ECG:     Previous ECG:  Compared to current    Comparison ECG info:  12/6/2018    Similarity:  No change  Interpretation:     Interpretation: normal    Rate:     ECG rate:  79    ECG rate assessment: normal    Rhythm:     Rhythm: sinus rhythm    Ectopy:     Ectopy: none    QRS:     QRS axis:  Normal    QRS intervals:  Normal  Conduction:     Conduction: normal    ST segments:     ST segments:  Normal  T waves:     T waves: normal             ED Course                               SBIRT 20yo+      Flowsheet Row Most Recent Value   Initial Alcohol Screen: US AUDIT-C     1. How often do you have a drink containing alcohol? 0 Filed at: 12/02/2023 0813   2. How many drinks containing alcohol do you have on a typical day you are drinking? 0 Filed at: 12/02/2023 0813   3a. Male UNDER 65: How often do you have five or more drinks on one occasion? 0 Filed at: 12/02/2023 0813   3b. FEMALE Any Age, or MALE 65+: How often do you have 4 or more drinks on one occassion?  0 Filed at: 12/02/2023 0813   Audit-C Score 0 Filed at: 12/02/2023 0274   JOHN: How many times in the past year have you. .. Used an illegal drug or used a prescription medication for non-medical reasons? Never Filed at: 12/02/2023 6001                      Medical Decision Making  Differential diagnosis: COVID, flu, RSV, pneumonia, rib fracture, pulmonary embolism  For chest x-ray, EKG, laboratories    Patient with elevated D-dimer which may be secondary to COVID or PE, given patient's history will obtain CTA of the chest to rule out pulmonary embolism. CTA was unremarkable for PE however did note a subacute sixth rib fracture which is consistent with the patient's history of fall. Patient also with small bilateral pleural effusions. Patient is not in any significant respiratory distress or with hypoxia. The plan is for discharge on Paxlovid and outpatient follow-up. The patient (and any family present) verbalized understanding of the discharge instructions and warnings that would necessitate return to the Emergency Department. All questions were answered prior to discharge. Amount and/or Complexity of Data Reviewed  External Data Reviewed: notes. Details: Care now note from November 30 reviewed  Labs: ordered. Radiology: ordered and independent interpretation performed. Risk  Prescription drug management.              Disposition  Final diagnoses:   COVID-19   Fracture of one rib, left side, initial encounter for closed fracture   Pleural effusion, bilateral     Time reflects when diagnosis was documented in both MDM as applicable and the Disposition within this note       Time User Action Codes Description Comment    12/2/2023 11:50 AM Michele Conner Add [U07.1] COVID-19     12/2/2023 11:50 AM Grayson CROW Add [S22.32XA] Fracture of one rib, left side, initial encounter for closed fracture     12/2/2023 11:50 AM Grayson CROW Add [J90] Pleural effusion, bilateral           ED Disposition       ED Disposition   Discharge    Condition   Stable    Date/Time   Sat Dec 2, 2023 6300 Comment   Tylerjersey Padilla . discharge to home/self care. Follow-up Information       Follow up With Specialties Details Why Contact Info Additional Information    Pili Julien MD General Practice Schedule an appointment as soon as possible for a visit  For further evaluation, to follow up on abnormal test [address] [facility] [id number] [id number] [place] [facility] Emergency Medicine Go to  If symptoms worsen [address] [facility] [phone]  [address] [facility] Emergency Department, [address] [facility], [place], [address]            Discharge Medication List as of 12/2/2023 11:58 AM        START taking these medications    Details   nirmatrelvir & ritonavir (Paxlovid, 300/100,) tablet therapy pack Take 3 tablets by mouth 2 (two) times a day for 5 days Take 2 nirmatrelvir tablets + 1 ritonavir tablet together per dose, Starting Sat 12/2/2023, Until Thu 12/7/2023, Normal           CONTINUE these medications which have NOT CHANGED    Details   chlorthalidone 25 mg tablet TAKE 1 TABLET (25 MG TOTAL) BY MOUTH DAILY. , Starting Mon 11/20/2023, Normal      ezetimibe (ZETIA) 10 mg tablet Take 10 mg by mouth every other day , Historical Med      Klor-Con M20 20 MEQ tablet TAKE 1 TABLET BY MOUTH TWICE A DAY, Starting Mon 11/20/2023, Normal      losartan (COZAAR) 50 mg tablet TAKE 1 TABLET BY MOUTH EVERY DAY, Normal      Multiple Vitamin (multivitamin) tablet Take 1 tablet by mouth daily, Historical Med      Nutritional Supplements (THERALITH XR) TABS Take by mouth 2 (two) times a day 2 tablets BID, Historical Med      Omega-3 Fatty Acids (Fish Oil) 300 MG CAPS Take 2 g by mouth daily, Historical Med      omeprazole (PriLOSEC) 20 mg delayed release capsule Take 20 mg by mouth daily, Historical Med             No discharge procedures on file.     PDMP Review       None            ED Provider  Electronically Signed by             Logan Padilla DO  12/02/23 1457

## 2023-12-06 DIAGNOSIS — I10 HYPERTENSION, UNSPECIFIED TYPE: ICD-10-CM

## 2023-12-06 DIAGNOSIS — I10 ESSENTIAL (PRIMARY) HYPERTENSION: ICD-10-CM

## 2023-12-06 RX ORDER — LOSARTAN POTASSIUM 50 MG/1
TABLET ORAL
Qty: 90 TABLET | Refills: 0 | Status: SHIPPED | OUTPATIENT
Start: 2023-12-06

## 2023-12-07 LAB
BACTERIA BLD CULT: NORMAL
BACTERIA BLD CULT: NORMAL

## 2024-01-08 ENCOUNTER — OFFICE VISIT (OUTPATIENT)
Dept: URGENT CARE | Facility: CLINIC | Age: 69
End: 2024-01-08
Payer: COMMERCIAL

## 2024-01-08 VITALS
OXYGEN SATURATION: 97 % | TEMPERATURE: 97.4 F | DIASTOLIC BLOOD PRESSURE: 98 MMHG | SYSTOLIC BLOOD PRESSURE: 140 MMHG | HEART RATE: 87 BPM | RESPIRATION RATE: 18 BRPM

## 2024-01-08 DIAGNOSIS — J06.9 VIRAL URI WITH COUGH: Primary | ICD-10-CM

## 2024-01-08 DIAGNOSIS — R05.1 ACUTE COUGH: ICD-10-CM

## 2024-01-08 LAB
SARS-COV-2 AG UPPER RESP QL IA: NEGATIVE
VALID CONTROL: NORMAL

## 2024-01-08 PROCEDURE — 99213 OFFICE O/P EST LOW 20 MIN: CPT

## 2024-01-08 PROCEDURE — 87811 SARS-COV-2 COVID19 W/OPTIC: CPT

## 2024-01-08 NOTE — PROGRESS NOTES
[facility]        NAME: Danilo Padilla Jr. is a 68 y.o. male  : 1955    MRN: [id number]  DATE: 2024  TIME: 8:52 AM    Assessment and Plan   Viral URI with cough [J06.9]  1. Viral URI with cough        2. Acute cough  Poct Covid 19 Rapid Antigen Test          Patient requesting rapid Covid test in clinic today. Patient aware that some people may still test positive for weeks to months after a positive test. Patient states he will be calling PCP office to schedule follow-up regarding current illness and to discuss CT results.      Patient Instructions     You are to go for your chest CT this morning as scheduled. Follow-up with your PCP to go over the results.    Your symptoms are consistent with a viral illness.    For nasal/sinus congestion you can try steam, warm compresses, saline nasal spray, Neti pot, nasal steroid (Flonase, Nasocort), or nasal decongestant (Afrin - for 3 days only).    You can try a decongestant (Sudafed) if > 6 years of age and no history of high blood pressure.    For cough you can take an over-the-counter expectorant such as plain Robitussion or Mucinex. A spoonful of honey at bedtime may also be helpful.    For cold symptoms with high blood pressure take Coricidin cough/cold.     For sore throat you can use Cepacol lozenges, do warm salt water gargles, drink warm water with lemon or herbal teas, or use an over-the-counter throat spray (Chloraseptic).    You can take ibuprofen/Motrin and acetaminophen/Tylenol as needed for pain, fever, body aches. Do not take ibuprofen/Motrin/Advil if you have a history of heart disease, bleeding ulcers, or if you take blood thinners.     Drink plenty of fluids to stay hydrated.    Follow up with your PCP in 5-7 days for persistent symptoms.    Go to the ER if symptoms worsen.        Chief Complaint     Chief Complaint   Patient presents with    Cough     Pt states he started on Wednesday with a cough, nasal congestion, fever and  chest congestion. Managing symptoms with Mucinex. Feels lightheaded with deep breaths. Recently had covid 11/30/23.         History of Present Illness       This is a 60-year-old male presenting with cold-like symptoms x 5 days.  Patient reports headaches, nasal congestion, rhinorrhea, chest congestion, and a loose productive cough.  Had fevers for the first 3 days of illness.  Reports associated SOB with coughing episodes.  Feels lightheaded when taking in deep breaths.  Taking Sudafed, Mucinex, Robitussin cold/cough medicine, and Tylenol.  Patient was diagnosed with COVID via rapid test in the clinic on 11/30.  He was seen in the ED for worsening symptoms on 12/02.  Chest CT ordered due to history of pulmonary embolism.  Results: No pulmonary embolus.  Trace bilateral lower lobe dependent subsegmental atelectasis and bilateral pleural effusion.  Acute to subacute nondisplaced fracture of the left anterior sixth rib.  Patient discharged from the ED on Paxlovid.  Repeat chest x-ray ordered by PCP and is scheduled for 10 AM today.        Review of Systems   Review of Systems   Constitutional:  Positive for fever (resolved).   HENT:  Positive for congestion and rhinorrhea. Negative for ear pain, sinus pressure and sore throat.    Eyes:  Negative for discharge and redness.   Respiratory:  Positive for cough and shortness of breath. Negative for wheezing.    Cardiovascular:  Negative for chest pain and palpitations.   Gastrointestinal:  Negative for abdominal pain, diarrhea, nausea and vomiting.   Skin:  Negative for pallor and rash.   Neurological:  Positive for light-headedness and headaches. Negative for dizziness.         Current Medications       Current Outpatient Medications:     chlorthalidone 25 mg tablet, TAKE 1 TABLET (25 MG TOTAL) BY MOUTH DAILY., Disp: 90 tablet, Rfl: 1    Klor-Con M20 20 MEQ tablet, TAKE 1 TABLET BY MOUTH TWICE A DAY, Disp: 180 tablet, Rfl: 1    losartan (COZAAR) 50 mg tablet, TAKE 1  TABLET BY MOUTH EVERY DAY, Disp: 90 tablet, Rfl: 0    Multiple Vitamin (multivitamin) tablet, Take 1 tablet by mouth daily, Disp: , Rfl:     Nutritional Supplements (THERALITH XR) TABS, Take by mouth 2 (two) times a day 2 tablets BID, Disp: , Rfl:     Omega-3 Fatty Acids (Fish Oil) 300 MG CAPS, Take 2 g by mouth daily, Disp: , Rfl:     omeprazole (PriLOSEC) 20 mg delayed release capsule, Take 20 mg by mouth daily, Disp: , Rfl:     ezetimibe (ZETIA) 10 mg tablet, Take 10 mg by mouth every other day  (Patient not taking: Reported on 10/19/2022), Disp: , Rfl:     Current Allergies     Allergies as of 01/08/2024 - Reviewed 01/08/2024   Allergen Reaction Noted    Moxifloxacin Shortness Of Breath and Hives 10/16/2013    Alcohol - food allergy  02/08/2017    Caffeine - food allergy Other (See Comments) 05/27/2016    Cephalosporins  05/27/2016    Doxycycline      Penicillins Hives 07/30/2015    Pravastatin      Prednisone Tinnitus 11/17/2017    Tamiflu [oseltamivir] Lightheadedness 04/04/2018    Claritin [loratadine] Anxiety 11/28/2023            The following portions of the patient's history were reviewed and updated as appropriate: allergies, current medications, past family history, past medical history, past social history, past surgical history and problem list.     Past Medical History:   Diagnosis Date    GERD (gastroesophageal reflux disease)     Hyperlipidemia     Hypertension     Kidney stone     Meniere disease     Pulmonary embolism (HCC)     Thoracic aortic aneurysm (HCC)        Past Surgical History:   Procedure Laterality Date    ABDOMINAL SURGERY      BACK SURGERY      COCHLEAR IMPLANT Right     COLONOSCOPY      CT NEEDLE BIOPSY MEDIASTINUM  5/17/2019    FL INJECTION LEFT SHOULDER (ARTHROGRAM)  01/19/2022    HERNIA REPAIR      IVC FILTER INSERTION         Family History   Problem Relation Age of Onset    Hypertension Mother     Hyperlipidemia Mother     Hypertension Father     Hyperlipidemia Father           Medications have been verified.        Objective   /98   Pulse 87   Temp (!) 97.4 °F (36.3 °C)   Resp 18   SpO2 97%        Physical Exam     Physical Exam  Vitals and nursing note reviewed.   Constitutional:       General: He is not in acute distress.     Appearance: He is not ill-appearing or diaphoretic.   HENT:      Head: Normocephalic.      Right Ear: Tympanic membrane, ear canal and external ear normal.      Left Ear: Tympanic membrane, ear canal and external ear normal.      Nose: Congestion present.      Mouth/Throat:      Mouth: Mucous membranes are moist.      Pharynx: Oropharynx is clear.   Eyes:      Conjunctiva/sclera: Conjunctivae normal.      Pupils: Pupils are equal, round, and reactive to light.   Cardiovascular:      Rate and Rhythm: Normal rate and regular rhythm.      Pulses: Normal pulses.      Heart sounds: Normal heart sounds.   Pulmonary:      Effort: Pulmonary effort is normal.      Breath sounds: Normal breath sounds. No wheezing, rhonchi or rales.   Musculoskeletal:         General: Normal range of motion.      Cervical back: Normal range of motion and neck supple.   Skin:     General: Skin is warm and dry.      Capillary Refill: Capillary refill takes less than 2 seconds.   Neurological:      Mental Status: He is alert and oriented to person, place, and time.

## 2024-01-08 NOTE — PATIENT INSTRUCTIONS
You are to go for your chest CT this morning as scheduled. Follow-up with your PCP to go over the results.    Your symptoms are consistent with a viral illness.    For nasal/sinus congestion you can try steam, warm compresses, saline nasal spray, Neti pot, nasal steroid (Flonase, Nasocort), or nasal decongestant (Afrin - for 3 days only).    You can try a decongestant (Sudafed) if > 6 years of age and no history of high blood pressure.    For cough you can take an over-the-counter expectorant such as plain Robitussion or Mucinex. A spoonful of honey at bedtime may also be helpful.    For cold symptoms with high blood pressure take Coricidin cough/cold.     For sore throat you can use Cepacol lozenges, do warm salt water gargles, drink warm water with lemon or herbal teas, or use an over-the-counter throat spray (Chloraseptic).    You can take ibuprofen/Motrin and acetaminophen/Tylenol as needed for pain, fever, body aches. Do not take ibuprofen/Motrin/Advil if you have a history of heart disease, bleeding ulcers, or if you take blood thinners.     Drink plenty of fluids to stay hydrated.    Follow up with your PCP in 5-7 days for persistent symptoms.    Go to the ER if symptoms worsen.

## 2024-02-10 ENCOUNTER — APPOINTMENT (OUTPATIENT)
Dept: RADIOLOGY | Facility: CLINIC | Age: 69
End: 2024-02-10
Payer: COMMERCIAL

## 2024-02-10 ENCOUNTER — OFFICE VISIT (OUTPATIENT)
Dept: URGENT CARE | Facility: CLINIC | Age: 69
End: 2024-02-10
Payer: COMMERCIAL

## 2024-02-10 VITALS
DIASTOLIC BLOOD PRESSURE: 70 MMHG | HEART RATE: 84 BPM | OXYGEN SATURATION: 96 % | RESPIRATION RATE: 18 BRPM | SYSTOLIC BLOOD PRESSURE: 130 MMHG | TEMPERATURE: 98.3 F

## 2024-02-10 DIAGNOSIS — R07.81 RIB PAIN ON RIGHT SIDE: Primary | ICD-10-CM

## 2024-02-10 DIAGNOSIS — S20.211A CONTUSION OF RIB ON RIGHT SIDE, INITIAL ENCOUNTER: ICD-10-CM

## 2024-02-10 PROCEDURE — 71101 X-RAY EXAM UNILAT RIBS/CHEST: CPT

## 2024-02-10 PROCEDURE — 99213 OFFICE O/P EST LOW 20 MIN: CPT | Performed by: FAMILY MEDICINE

## 2024-02-10 NOTE — PROGRESS NOTES
"[facility]        NAME: Danilo Padilla Jr. is a 68 y.o. male  : 1955    MRN: [id number]  DATE: February 10, 2024  TIME: 7:46 PM    Assessment and Plan   Rib pain on right side [R07.81]  1. Rib pain on right side  XR ribs right w pa chest min 3 views      2. Contusion of rib on right side, initial encounter              Patient Instructions       Follow up with PCP in 3-5 days.  Proceed to  ER if symptoms worsen.    Chief Complaint     Chief Complaint   Patient presents with    Right Rib Pain      Pt reports he fell today at approximately 4:30 and hit the right side of his torso on the arm of the chair and heard a \"crack\". Pt reports minor pain with inhalation. Pt denies hitting head during fall.          History of Present Illness       68-year-old male presenting with rib pain.  He reports approximately 2 hours ago, tripping and falling inside his house and hitting his right chest wall on boxes in his house.  He reports hearing a pop.  At this time, he does note some mild discomfort along the right side of his chest wall and right flank region.  Denies any difficulties with breathing or taking deep breaths.  Denies any chest pain or shortness of breath.        Review of Systems   Review of Systems   Constitutional: Negative.    HENT: Negative.     Eyes: Negative.    Respiratory: Negative.     Cardiovascular: Negative.    Gastrointestinal: Negative.    Genitourinary: Negative.    Musculoskeletal:  Positive for arthralgias and myalgias.   Skin: Negative.    Allergic/Immunologic: Negative.    Neurological: Negative.    Hematological: Negative.    Psychiatric/Behavioral: Negative.           Current Medications       Current Outpatient Medications:     chlorthalidone 25 mg tablet, TAKE 1 TABLET (25 MG TOTAL) BY MOUTH DAILY., Disp: 90 tablet, Rfl: 1    Klor-Con M20 20 MEQ tablet, TAKE 1 TABLET BY MOUTH TWICE A DAY, Disp: 180 tablet, Rfl: 1    losartan (COZAAR) 50 mg tablet, TAKE 1 TABLET BY MOUTH EVERY " DAY, Disp: 90 tablet, Rfl: 0    Multiple Vitamin (multivitamin) tablet, Take 1 tablet by mouth daily, Disp: , Rfl:     Omega-3 Fatty Acids (Fish Oil) 300 MG CAPS, Take 2 g by mouth daily, Disp: , Rfl:     omeprazole (PriLOSEC) 20 mg delayed release capsule, Take 20 mg by mouth daily, Disp: , Rfl:     ezetimibe (ZETIA) 10 mg tablet, Take 10 mg by mouth every other day  (Patient not taking: Reported on 10/19/2022), Disp: , Rfl:     Nutritional Supplements (THERALITH XR) TABS, Take by mouth 2 (two) times a day 2 tablets BID, Disp: , Rfl:     Current Allergies     Allergies as of 02/10/2024 - Reviewed 02/10/2024   Allergen Reaction Noted    Moxifloxacin Shortness Of Breath and Hives 10/16/2013    Alcohol - food allergy  02/08/2017    Caffeine - food allergy Other (See Comments) 05/27/2016    Cephalosporins  05/27/2016    Doxycycline      Penicillins Hives 07/30/2015    Pravastatin      Prednisone Tinnitus 11/17/2017    Tamiflu [oseltamivir] Lightheadedness 04/04/2018    Claritin [loratadine] Anxiety 11/28/2023            The following portions of the patient's history were reviewed and updated as appropriate: allergies, current medications, past family history, past medical history, past social history, past surgical history and problem list.     Past Medical History:   Diagnosis Date    GERD (gastroesophageal reflux disease)     Hyperlipidemia     Hypertension     Kidney stone     Meniere disease     Pulmonary embolism (HCC)     Thoracic aortic aneurysm (HCC)        Past Surgical History:   Procedure Laterality Date    ABDOMINAL SURGERY      BACK SURGERY      COCHLEAR IMPLANT Right     COLONOSCOPY      CT NEEDLE BIOPSY MEDIASTINUM  5/17/2019    FL INJECTION LEFT SHOULDER (ARTHROGRAM)  01/19/2022    HERNIA REPAIR      IVC FILTER INSERTION         Family History   Problem Relation Age of Onset    Hypertension Mother     Hyperlipidemia Mother     Hypertension Father     Hyperlipidemia Father          Medications have been  verified.        Objective   /70   Pulse 84   Temp 98.3 °F (36.8 °C) (Temporal)   Resp 18   SpO2 96%   No LMP for male patient.       Physical Exam     Physical Exam  Constitutional:       Appearance: He is well-developed.   Eyes:      Pupils: Pupils are equal, round, and reactive to light.   Cardiovascular:      Rate and Rhythm: Normal rate.   Pulmonary:      Effort: Pulmonary effort is normal.   Chest:      Chest wall: Tenderness and edema present.       Musculoskeletal:         General: Tenderness present. Normal range of motion.      Cervical back: Normal range of motion.   Skin:     General: Skin is warm.   Neurological:      Mental Status: He is alert.

## 2024-02-12 ENCOUNTER — OFFICE VISIT (OUTPATIENT)
Dept: UROLOGY | Facility: MEDICAL CENTER | Age: 69
End: 2024-02-12
Payer: COMMERCIAL

## 2024-02-12 VITALS
BODY MASS INDEX: 33.59 KG/M2 | WEIGHT: 214 LBS | OXYGEN SATURATION: 96 % | HEART RATE: 70 BPM | HEIGHT: 67 IN | DIASTOLIC BLOOD PRESSURE: 70 MMHG | SYSTOLIC BLOOD PRESSURE: 130 MMHG

## 2024-02-12 DIAGNOSIS — N13.8 BPH WITH URINARY OBSTRUCTION: ICD-10-CM

## 2024-02-12 DIAGNOSIS — N20.0 KIDNEY STONES: Primary | ICD-10-CM

## 2024-02-12 DIAGNOSIS — N40.1 BPH WITH URINARY OBSTRUCTION: ICD-10-CM

## 2024-02-12 LAB
SL AMB  POCT GLUCOSE, UA: NORMAL
SL AMB LEUKOCYTE ESTERASE,UA: NORMAL
SL AMB POCT BILIRUBIN,UA: NORMAL
SL AMB POCT BLOOD,UA: NORMAL
SL AMB POCT CLARITY,UA: CLEAR
SL AMB POCT COLOR,UA: YELLOW
SL AMB POCT KETONES,UA: NORMAL
SL AMB POCT NITRITE,UA: NORMAL
SL AMB POCT PH,UA: 6
SL AMB POCT SPECIFIC GRAVITY,UA: 1.02
SL AMB POCT URINE PROTEIN: NORMAL
SL AMB POCT UROBILINOGEN: 0.2

## 2024-02-12 PROCEDURE — 81003 URINALYSIS AUTO W/O SCOPE: CPT | Performed by: UROLOGY

## 2024-02-12 PROCEDURE — 99204 OFFICE O/P NEW MOD 45 MIN: CPT | Performed by: UROLOGY

## 2024-02-12 NOTE — PROGRESS NOTES
"   HISTORY:    Long history of stones, patient wants reevaluation.    Treatments over 10 years ago, shockwave lithotripsy, possibly something with the scope as well.    Last CT scan in 2021 showed bilateral lower pole stones.    Minimal BPH symptoms, not bothered at all         ASSESSMENT / PLAN:      CT to evaluate for stones, phone discussion if treatment needed    The following portions of the patient's history were reviewed and updated as appropriate: allergies, current medications, past family history, past medical history, past social history, past surgical history, and problem list.    Review of Systems      Objective:     Physical Exam  Genitourinary:     Comments: Penis testes normal    Prostate minimally enlarged no nodules          No results found for: \"PSA\"]  BUN   Date Value Ref Range Status   12/02/2023 12 5 - 25 mg/dL Final   06/29/2021 19 8 - 27 mg/dL Final     Creatinine   Date Value Ref Range Status   12/02/2023 1.00 0.60 - 1.30 mg/dL Final     Comment:     Standardized to IDMS reference method   10/07/2015 1.00 0.60 - 1.30 mg/dL Final     Comment:     Standardized to IDMS reference method     No components found for: \"CBC\"      Patient Active Problem List   Diagnosis    Pes anserinus bursitis of left knee    Bursitis of left knee    Shoulder impingement syndrome, right    Nontraumatic complete tear of left rotator cuff    GERD (gastroesophageal reflux disease)    Hyperlipidemia    Hypertension    Viral infection    Contusion of rib on right side    Kidney stones        Diagnoses and all orders for this visit:    Kidney stones  -     POCT urine dip auto non-scope  -     CT abdomen pelvis wo contrast; Future    BPH with urinary obstruction           Patient ID: Danilo Padilla  is a 68 y.o. male.      Current Outpatient Medications:     chlorthalidone 25 mg tablet, TAKE 1 TABLET (25 MG TOTAL) BY MOUTH DAILY., Disp: 90 tablet, Rfl: 1    Klor-Con M20 20 MEQ tablet, TAKE 1 TABLET BY MOUTH TWICE A " DAY, Disp: 180 tablet, Rfl: 1    losartan (COZAAR) 50 mg tablet, TAKE 1 TABLET BY MOUTH EVERY DAY, Disp: 90 tablet, Rfl: 0    Multiple Vitamin (multivitamin) tablet, Take 1 tablet by mouth daily, Disp: , Rfl:     Nutritional Supplements (THERALITH XR) TABS, Take by mouth 2 (two) times a day 2 tablets BID, Disp: , Rfl:     Omega-3 Fatty Acids (Fish Oil) 300 MG CAPS, Take 2 g by mouth daily, Disp: , Rfl:     ezetimibe (ZETIA) 10 mg tablet, Take 10 mg by mouth every other day, Disp: , Rfl:     omeprazole (PriLOSEC) 20 mg delayed release capsule, Take 20 mg by mouth daily (Patient not taking: Reported on 2/12/2024), Disp: , Rfl:     Past Medical History:   Diagnosis Date    GERD (gastroesophageal reflux disease)     Hyperlipidemia     Hypertension     Kidney stone     Meniere disease     Pulmonary embolism (HCC)     Thoracic aortic aneurysm (HCC)        Past Surgical History:   Procedure Laterality Date    ABDOMINAL SURGERY      BACK SURGERY      COCHLEAR IMPLANT Right     COLONOSCOPY      CT NEEDLE BIOPSY MEDIASTINUM  5/17/2019    FL INJECTION LEFT SHOULDER (ARTHROGRAM)  01/19/2022    HERNIA REPAIR      IVC FILTER INSERTION         Social History

## 2024-03-07 ENCOUNTER — HOSPITAL ENCOUNTER (OUTPATIENT)
Dept: CT IMAGING | Facility: HOSPITAL | Age: 69
Discharge: HOME | End: 2024-03-07
Attending: UROLOGY
Payer: COMMERCIAL

## 2024-03-07 DIAGNOSIS — N20.0 KIDNEY STONES: ICD-10-CM

## 2024-03-07 PROCEDURE — G1004 CDSM NDSC: HCPCS

## 2024-03-07 PROCEDURE — 74176 CT ABD & PELVIS W/O CONTRAST: CPT

## 2024-03-08 DIAGNOSIS — I10 HYPERTENSION, UNSPECIFIED TYPE: ICD-10-CM

## 2024-03-08 DIAGNOSIS — I10 ESSENTIAL (PRIMARY) HYPERTENSION: ICD-10-CM

## 2024-03-08 RX ORDER — LOSARTAN POTASSIUM 50 MG/1
TABLET ORAL
Qty: 90 TABLET | Refills: 0 | Status: SHIPPED | OUTPATIENT
Start: 2024-03-08

## 2024-05-22 DIAGNOSIS — I10 HYPERTENSION, UNSPECIFIED TYPE: ICD-10-CM

## 2024-05-22 DIAGNOSIS — E87.6 HYPOKALEMIA: ICD-10-CM

## 2024-05-22 NOTE — TELEPHONE ENCOUNTER
Requested medication(s) are due for refill today: Yes  Patient has already received a courtesy refill: Yes  Other reason request has been forwarded to provider: Due to RX protocol, RX was flagged. Please review.

## 2024-05-24 RX ORDER — POTASSIUM CHLORIDE 1500 MG/1
20 TABLET, EXTENDED RELEASE ORAL 2 TIMES DAILY
Qty: 180 TABLET | Refills: 1 | Status: SHIPPED | OUTPATIENT
Start: 2024-05-24

## 2024-05-24 RX ORDER — CHLORTHALIDONE 25 MG/1
25 TABLET ORAL DAILY
Qty: 90 TABLET | Refills: 1 | Status: SHIPPED | OUTPATIENT
Start: 2024-05-24

## 2024-05-24 NOTE — TELEPHONE ENCOUNTER
Requested medication(s) are due for refill today: Yes  Patient has already received a courtesy refill: Yes  Other reason request has been forwarded to provider: Due for f/u. Msg left for pt to schedule

## 2024-06-10 DIAGNOSIS — I10 ESSENTIAL (PRIMARY) HYPERTENSION: ICD-10-CM

## 2024-06-10 DIAGNOSIS — I10 HYPERTENSION, UNSPECIFIED TYPE: ICD-10-CM

## 2024-06-11 ENCOUNTER — TELEPHONE (OUTPATIENT)
Dept: CARDIOLOGY CLINIC | Facility: CLINIC | Age: 69
End: 2024-06-11

## 2024-06-11 RX ORDER — LOSARTAN POTASSIUM 50 MG/1
TABLET ORAL
Qty: 90 TABLET | Refills: 0 | Status: SHIPPED | OUTPATIENT
Start: 2024-06-11

## 2024-07-12 ENCOUNTER — OFFICE VISIT (OUTPATIENT)
Dept: URGENT CARE | Facility: CLINIC | Age: 69
End: 2024-07-12
Payer: COMMERCIAL

## 2024-07-12 ENCOUNTER — APPOINTMENT (OUTPATIENT)
Dept: RADIOLOGY | Facility: CLINIC | Age: 69
End: 2024-07-12
Payer: COMMERCIAL

## 2024-07-12 VITALS
TEMPERATURE: 99.3 F | DIASTOLIC BLOOD PRESSURE: 70 MMHG | HEIGHT: 67 IN | SYSTOLIC BLOOD PRESSURE: 130 MMHG | BODY MASS INDEX: 33.52 KG/M2 | RESPIRATION RATE: 18 BRPM | OXYGEN SATURATION: 97 % | HEART RATE: 71 BPM

## 2024-07-12 DIAGNOSIS — S99.921A INJURY OF TOE ON RIGHT FOOT, INITIAL ENCOUNTER: Primary | ICD-10-CM

## 2024-07-12 DIAGNOSIS — S99.921A INJURY OF TOE ON RIGHT FOOT, INITIAL ENCOUNTER: ICD-10-CM

## 2024-07-12 PROCEDURE — S9083 URGENT CARE CENTER GLOBAL: HCPCS

## 2024-07-12 PROCEDURE — 73630 X-RAY EXAM OF FOOT: CPT

## 2024-07-12 PROCEDURE — G0382 LEV 3 HOSP TYPE B ED VISIT: HCPCS

## 2024-07-12 NOTE — PROGRESS NOTES
[facility]        NAME: Danilo Padilla Jr. is a 69 y.o. male  : 1955    MRN: [id number]  DATE: 2024  TIME: 11:15 AM    Assessment and Plan   Injury of toe on right foot, initial encounter [S99.921A]  1. Injury of toe on right foot, initial encounter  XR foot 3+ vw right    Ambulatory Referral to Podiatry        Your preliminary x-ray results of your right foot shows no bony abnormality.  The radiologist will read the Xray and if they find any bony abnormalities I will call you    Patient Instructions   Wear loosefitting shoes  Ice to the second toe  Tylenol or Motrin for pain  If pain does not improve follow-up with podiatrist  Follow up with PCP in 3-5 days.  Proceed to  ER if symptoms worsen.    If tests have been performed at [facility], our office will contact you with results if changes need to be made to the care plan discussed with you at the visit.  [facility]    Chief Complaint     Chief Complaint   Patient presents with    Toe Injury     Pt reports right foot second digit injury that occurred two weeks agao after kicking a glass candy dish. States re-injury yesterday when pt accidentally pulled a hand truck over the right foot. C/o swelling. Not currently managing with otc medication.           History of Present Illness       This is a 69-year-old male who presents today with pain in the right second toe.  He states 2 weeks ago that he was moving some things around and was angry and kicked the candy dish.  He said he had some pain but it was not too bad.  Yesterday he was moving some furniture around and the hand truck rolled over his toe.  No increased pain but he noticed today that his second right toe was more swollen.  He has tenderness to the middle phalange  of the second toe with some swelling        Review of Systems   Review of Systems   Constitutional: Negative.    HENT: Negative.     Respiratory: Negative.     Cardiovascular:  Negative.    Gastrointestinal: Negative.    Musculoskeletal:  Positive for arthralgias (R 2nd toe).   Neurological: Negative.          Current Medications       Current Outpatient Medications:     chlorthalidone 25 mg tablet, TAKE 1 TABLET (25 MG TOTAL) BY MOUTH DAILY., Disp: 90 tablet, Rfl: 1    Klor-Con M20 20 MEQ tablet, TAKE 1 TABLET BY MOUTH TWICE A DAY, Disp: 180 tablet, Rfl: 1    losartan (COZAAR) 50 mg tablet, TAKE 1 TABLET BY MOUTH EVERY DAY, Disp: 90 tablet, Rfl: 0    Multiple Vitamin (multivitamin) tablet, Take 1 tablet by mouth daily, Disp: , Rfl:     Omega-3 Fatty Acids (Fish Oil) 300 MG CAPS, Take 2 g by mouth daily, Disp: , Rfl:     ezetimibe (ZETIA) 10 mg tablet, Take 10 mg by mouth every other day (Patient not taking: Reported on 7/12/2024), Disp: , Rfl:     Nutritional Supplements (THERALITH XR) TABS, Take by mouth 2 (two) times a day 2 tablets BID (Patient not taking: Reported on 7/12/2024), Disp: , Rfl:     omeprazole (PriLOSEC) 20 mg delayed release capsule, Take 20 mg by mouth daily (Patient not taking: Reported on 2/12/2024), Disp: , Rfl:     Current Allergies     Allergies as of 07/12/2024 - Reviewed 07/12/2024   Allergen Reaction Noted    Moxifloxacin Shortness Of Breath and Hives 10/16/2013    Alcohol - food allergy  02/08/2017    Caffeine - food allergy Other (See Comments) 05/27/2016    Cephalosporins  05/27/2016    Doxycycline      Penicillins Hives 07/30/2015    Pravastatin      Prednisone Tinnitus 11/17/2017    Tamiflu [oseltamivir] Lightheadedness 04/04/2018    Claritin [loratadine] Anxiety 11/28/2023            The following portions of the patient's history were reviewed and updated as appropriate: allergies, current medications, past family history, past medical history, past social history, past surgical history and problem list.     Past Medical History:   Diagnosis Date    GERD (gastroesophageal reflux disease)     Hyperlipidemia     Hypertension     Kidney stone     Meniere  "disease     Pulmonary embolism (HCC)     Thoracic aortic aneurysm (HCC)        Past Surgical History:   Procedure Laterality Date    ABDOMINAL SURGERY      BACK SURGERY      COCHLEAR IMPLANT Right     COLONOSCOPY      CT NEEDLE BIOPSY MEDIASTINUM  5/17/2019    FL INJECTION LEFT SHOULDER (ARTHROGRAM)  01/19/2022    HERNIA REPAIR      IVC FILTER INSERTION         Family History   Problem Relation Age of Onset    Hypertension Mother     Hyperlipidemia Mother     Hypertension Father     Hyperlipidemia Father          Medications have been verified.        Objective   /70 (BP Location: Right arm, Patient Position: Sitting)   Pulse 71   Temp 99.3 °F (37.4 °C)   Resp 18   Ht 5' 7\" (1.702 m)   SpO2 97%   BMI 33.52 kg/m²   No LMP for male patient.       Physical Exam     Physical Exam  Vitals reviewed.   Constitutional:       General: He is not in acute distress.     Appearance: Normal appearance. He is not ill-appearing.   HENT:      Head: Normocephalic and atraumatic.   Eyes:      Extraocular Movements: Extraocular movements intact.      Conjunctiva/sclera: Conjunctivae normal.   Pulmonary:      Effort: Pulmonary effort is normal.   Musculoskeletal:        Feet:    Feet:      Comments: Patient has swelling and tenderness on his second right toe  Skin:     General: Skin is warm.   Neurological:      General: No focal deficit present.      Mental Status: He is alert.   Psychiatric:         Mood and Affect: Mood normal.         Behavior: Behavior normal.         Judgment: Judgment normal.                   "

## 2024-07-12 NOTE — PATIENT INSTRUCTIONS
Wear loosefitting shoes  Ice to the second toe  Tylenol or Motrin for pain  If pain does not improve follow-up with podiatrist

## 2024-08-12 NOTE — PROGRESS NOTES
Cardiology Follow Up    Danilo Padilla Jr.  1955  [id number]  [facility]  [address]  [place] [id number] [phone]    1. Essential (primary) hypertension  Echo complete w/ contrast if indicated      2. Pure hypercholesterolemia  Echo complete w/ contrast if indicated      3. Mild ascending aorta dilatation (HCC)  Echo complete w/ contrast if indicated      4. Palpitations  Echo complete w/ contrast if indicated        Interval History: Patient is here for f/u visit.  He has a mild descending thoracic aortic aneurysm seen on CT scan 12/2018.  It was 4.3 x 3.9 cm. He had IVC filter placement in reference to prior veno-thromboembolism.  ETT 1/2019  demonstrated no evidence of provokable ischemia.  His LVEF was 62%.  Patient admits to statin intolerance in reference to muscle and memory issues.  He did not even tolerate pravastatin 10 mg per day. A lipid profile 9/2023 demonstrated total cholesterol of 202 with an HDL of 44 and a calculated LDL of 138.  Patient was on Zetia.  Patient is statin intolerant in reference to muscle and memory issues.  He did not even tolerate pravastatin 10 mg/day.  Patient was having issues with palpitations.  48-hour HM 7/2022 demonstrated NSR echocardiogram and with average heart rate of 75 and with rare PACs and PVCs.  He does not drink alcohol or caffeinated beverages.  Echocardiogram 7/2023 demonstrated LVEF of 60% with no significant valve disease.  CTA of the chest 12/2/2023 demonstrated stable ectasia of the ascending aorta with maximal diameter of 4.0 cm.  There was minimal aortic calcification.  Patient has had no chest pain or significant dyspnea.  His HTN and HLD are stable on his current medicine.    Patient Active Problem List   Diagnosis   • Pes anserinus bursitis of left knee   • Bursitis of left knee   • Shoulder impingement syndrome, right   • Nontraumatic complete tear of  left rotator cuff   • GERD (gastroesophageal reflux disease)   • Hyperlipidemia   • Hypertension   • Viral infection   • Contusion of rib on right side   • Kidney stones     Past Medical History:   Diagnosis Date   • GERD (gastroesophageal reflux disease)    • Hyperlipidemia    • Hypertension    • Kidney stone    • Meniere disease    • Pulmonary embolism (HCC)    • Thoracic aortic aneurysm (HCC)      Social History     Socioeconomic History   • Marital status:      Spouse name: Not on file   • Number of children: Not on file   • Years of education: Not on file   • Highest education level: Not on file   Occupational History   • Not on file   Tobacco Use   • Smoking status: Never     Passive exposure: Never   • Smokeless tobacco: Never   Vaping Use   • Vaping status: Never Used   Substance and Sexual Activity   • Alcohol use: No   • Drug use: No   • Sexual activity: Not on file   Other Topics Concern   • Not on file   Social History Narrative   • Not on file     Social Determinants of Health     Financial Resource Strain: Not At Risk (1/24/2024)    [facility]    Financial Resource Strain    • In the last 12 months did you skip medications to save money?: No    • In the last 12 months, was there a time when you needed to see a doctor but could not because of cost?: No   Food Insecurity: Not At Risk (1/24/2024)    [facility]    Food Insecurity    • In the last 12 months did you ever eat less than you felt you should because there wasn't enough money for food?: No   Transportation Needs: Not At Risk (1/24/2024)    Received from [facility], [facility]    Transporation    • In the last 12 months, have you ever had to go without healthcare because you didn't have a way to get there?: No   Physical Activity:  Inactive (2/25/2020)    Received from [facility]    Exercise Vital Sign    • Days of Exercise per Week: 0 days    • Minutes of Exercise per Session: 0 min   Stress: No Stress Concern Present (2/25/2020)    Received from [facility]    [employer] - Occupational Stress Questionnaire    • Feeling of Stress : Not at all   Social Connections: Not At Risk (1/24/2024)    [facility]    Social Connections    • Do you often feel lonely?: No   Intimate Partner Violence: Not on file   Housing Stability: Not At Risk (1/24/2024)    [facility], [facility]    Housing Stability    • Are you worried that in the next 2 months you may not have stable housing?: No      Family History   Problem Relation Age of Onset   • Hypertension Mother    • Hyperlipidemia Mother    • Hypertension Father    • Hyperlipidemia Father      Past Surgical History:   Procedure Laterality Date   • ABDOMINAL SURGERY     • BACK SURGERY     • COCHLEAR IMPLANT Right    • COLONOSCOPY     • CT NEEDLE BIOPSY MEDIASTINUM  5/17/2019   • FL INJECTION LEFT SHOULDER (ARTHROGRAM)  01/19/2022   • HERNIA REPAIR     • IVC FILTER INSERTION         Current Outpatient Medications:   •  Klor-Con M20 20 MEQ tablet, TAKE 1 TABLET BY MOUTH TWICE A DAY, Disp: 180 tablet, Rfl: 1  •  losartan (COZAAR) 50 mg tablet, TAKE 1 TABLET BY MOUTH EVERY DAY, Disp: 90 tablet, Rfl: 0  •  magnesium (MAGTAB) 84 MG (7MEQ) TBCR, Take 84 mg by mouth daily, Disp: , Rfl:   •  Multiple Vitamin (multivitamin) tablet, Take 1 tablet by mouth daily, Disp: , Rfl:   •  Omega-3 Fatty Acids (Fish Oil) 300 MG CAPS, Take 2 g by mouth daily, Disp: , Rfl:   •  omeprazole (PriLOSEC) 20 mg delayed release capsule, Take 20 mg by mouth daily, Disp: , Rfl:   •  chlorthalidone 25 mg tablet,  "TAKE 1 TABLET (25 MG TOTAL) BY MOUTH DAILY., Disp: 90 tablet, Rfl: 1  •  ezetimibe (ZETIA) 10 mg tablet, Take 10 mg by mouth every other day (Patient not taking: Reported on 7/12/2024), Disp: , Rfl:   •  Nutritional Supplements (THERALITH XR) TABS, Take by mouth 2 (two) times a day 2 tablets BID (Patient not taking: Reported on 7/12/2024), Disp: , Rfl:   Allergies   Allergen Reactions   • Moxifloxacin Shortness Of Breath and Hives   • Alcohol - Food Allergy      vertigo   • Caffeine - Food Allergy Other (See Comments)     vertigo   • Cephalosporins      hyperventilation   • Doxycycline    • Penicillins Hives   • Pravastatin      Other reaction(s): Respiratory Distress   • Prednisone Tinnitus     Other reaction(s): Unknown   • Tamiflu [Oseltamivir] Lightheadedness     Hyperventilation   • Claritin [Loratadine] Anxiety       Labs:not applicable  Imaging: No results found.    Review of Systems:  Review of Systems   All other systems reviewed and are negative.      Physical Exam:  /64 (BP Location: Left arm, Patient Position: Sitting, Cuff Size: Standard)   Pulse 78   Ht 5' 7\" (1.702 m)   Wt 98 kg (216 lb)   SpO2 96%   BMI 33.83 kg/m²   Physical Exam  Vitals reviewed.   Constitutional:       Appearance: He is well-developed.   HENT:      Head: Normocephalic and atraumatic.   Cardiovascular:      Rate and Rhythm: Normal rate.      Heart sounds: Normal heart sounds.   Pulmonary:      Effort: Pulmonary effort is normal.      Breath sounds: Normal breath sounds.   Musculoskeletal:      Cervical back: Normal range of motion.   Skin:     General: Skin is warm and dry.   Neurological:      Mental Status: He is alert and oriented to person, place, and time.         Discussion/Summary:I will continue the patient's present medical regimen.  The patient appears well compensated.  I have asked the patient to call if there is a problem in the interim otherwise I will see the patient in one years time. Patient is due for " an echocardiogram prior to the next visit to assess LV wall thickness and systolic function.

## 2024-08-15 ENCOUNTER — OFFICE VISIT (OUTPATIENT)
Dept: PODIATRY | Facility: CLINIC | Age: 69
End: 2024-08-15
Payer: COMMERCIAL

## 2024-08-15 VITALS
HEIGHT: 67 IN | DIASTOLIC BLOOD PRESSURE: 75 MMHG | HEART RATE: 76 BPM | BODY MASS INDEX: 33.74 KG/M2 | WEIGHT: 215 LBS | SYSTOLIC BLOOD PRESSURE: 132 MMHG

## 2024-08-15 DIAGNOSIS — S90.121A CONTUSION OF SECOND TOE OF RIGHT FOOT, INITIAL ENCOUNTER: Primary | ICD-10-CM

## 2024-08-15 DIAGNOSIS — S99.921A INJURY OF TOE ON RIGHT FOOT, INITIAL ENCOUNTER: ICD-10-CM

## 2024-08-15 DIAGNOSIS — M79.674 PAIN IN RIGHT TOE(S): ICD-10-CM

## 2024-08-15 PROCEDURE — 99202 OFFICE O/P NEW SF 15 MIN: CPT | Performed by: PODIATRIST

## 2024-08-15 RX ORDER — MAGNESIUM L-LACTATE 84 MG
84 TABLET, EXTENDED RELEASE ORAL DAILY
COMMUNITY

## 2024-08-15 NOTE — PROGRESS NOTES
Ambulatory Visit  Name: Danilo Padilla Jr.      : 1955      MRN: [id number]  Encounter Provider: Miller Swan DPM  Encounter Date: 8/15/2024   Encounter department: [facility] [place]    Assessment & Plan   1. Contusion of second toe of right foot, initial encounter  2. Injury of toe on right foot, initial encounter  -     Ambulatory Referral to Podiatry  3. Pain in right toe(s)    X-rays 2024 personally reviewed show 1 area suspicious for a possible fracture involving the second toe PIPJ but everything is in reasonably good alignment.  Since patient has been working full-time on this ever since the injury and it has not slowed him down any would recommend he continue to elevate when he can and put some ice on it.  If it fails to resolve over the next several weeks he should call for follow-up appointment.  It was explained to him that with fractures and injuries if you continue to walk on them and not allow them to heal up appropriately sometimes he will have his residual swelling that will last for many months.  Follow-up as needed.      History of Present Illness     Danilo Padilla Jr. is a 69 y.o. male who presents concerned with swollen left second toe which came after he kicked an item back in July emptying out house.  Reports the toe became swollen he then went and had a looked at an x-ray.  They told him it was negative for fracture and then referred him here for evaluation.    Review of Systems   Constitutional: Negative.    HENT: Negative.     Eyes: Negative.    Respiratory: Negative.     Cardiovascular: Negative.    Gastrointestinal: Negative.    Endocrine: Negative.    Genitourinary: Negative.    Musculoskeletal:         Swollen left second toe   Allergic/Immunologic: Negative.    Neurological: Negative.    Hematological: Negative.    Psychiatric/Behavioral: Negative.       Past Medical History   Past Medical History:   Diagnosis Date   • GERD (gastroesophageal reflux disease)     • Hyperlipidemia    • Hypertension    • Kidney stone    • Meniere disease    • Pulmonary embolism (HCC)    • Thoracic aortic aneurysm (HCC)      Past Surgical History:   Procedure Laterality Date   • ABDOMINAL SURGERY     • BACK SURGERY     • COCHLEAR IMPLANT Right    • COLONOSCOPY     • CT NEEDLE BIOPSY MEDIASTINUM  5/17/2019   • FL INJECTION LEFT SHOULDER (ARTHROGRAM)  01/19/2022   • HERNIA REPAIR     • IVC FILTER INSERTION       Family History   Problem Relation Age of Onset   • Hypertension Mother    • Hyperlipidemia Mother    • Hypertension Father    • Hyperlipidemia Father      Current Outpatient Medications on File Prior to Visit   Medication Sig Dispense Refill   • chlorthalidone 25 mg tablet TAKE 1 TABLET (25 MG TOTAL) BY MOUTH DAILY. 90 tablet 1   • Klor-Con M20 20 MEQ tablet TAKE 1 TABLET BY MOUTH TWICE A  tablet 1   • losartan (COZAAR) 50 mg tablet TAKE 1 TABLET BY MOUTH EVERY DAY 90 tablet 0   • magnesium (MAGTAB) 84 MG (7MEQ) TBCR Take 84 mg by mouth daily     • Multiple Vitamin (multivitamin) tablet Take 1 tablet by mouth daily     • Omega-3 Fatty Acids (Fish Oil) 300 MG CAPS Take 2 g by mouth daily     • ezetimibe (ZETIA) 10 mg tablet Take 10 mg by mouth every other day (Patient not taking: Reported on 7/12/2024)     • Nutritional Supplements (THERALITH XR) TABS Take by mouth 2 (two) times a day 2 tablets BID (Patient not taking: Reported on 7/12/2024)     • omeprazole (PriLOSEC) 20 mg delayed release capsule Take 20 mg by mouth daily (Patient not taking: Reported on 2/12/2024)       No current facility-administered medications on file prior to visit.     Allergies   Allergen Reactions   • Moxifloxacin Shortness Of Breath and Hives   • Alcohol - Food Allergy      vertigo   • Caffeine - Food Allergy Other (See Comments)     vertigo   • Cephalosporins      hyperventilation   • Doxycycline    • Penicillins Hives   • Pravastatin      Other reaction(s): Respiratory Distress   • Prednisone  "Tinnitus     Other reaction(s): Unknown   • Tamiflu [Oseltamivir] Lightheadedness     Hyperventilation   • Claritin [Loratadine] Anxiety      Objective     /75 (BP Location: Left arm, Patient Position: Sitting, Cuff Size: Adult)   Pulse 76   Ht 5' 7\" (1.702 m) Comment: stated  Wt 97.5 kg (215 lb)   BMI 33.67 kg/m²     Physical Exam  Vitals reviewed.   Constitutional:       General: He is not in acute distress.     Appearance: He is well-developed.   HENT:      Head: Normocephalic and atraumatic.      Nose: Nose normal.   Eyes:      Conjunctiva/sclera: Conjunctivae normal.   Cardiovascular:      Rate and Rhythm: Normal rate and regular rhythm.      Pulses: Normal pulses.   Pulmonary:      Effort: Pulmonary effort is normal.   Musculoskeletal:         General: Swelling and tenderness present.      Cervical back: Neck supple.      Comments: Right second toe mildly swollen since injury back in July.  Mild tenderness with palpation PIPJ but for the most part the toe is not very symptomatic.  Aside from the swelling it is functioning adequately.   Feet:      Right foot:      Protective Sensation: 10 sites tested.  10 sites sensed.      Left foot:      Protective Sensation: 10 sites tested.  10 sites sensed.   Skin:     General: Skin is warm and dry.      Capillary Refill: Capillary refill takes 2 to 3 seconds.   Neurological:      General: No focal deficit present.      Mental Status: He is alert and oriented to person, place, and time.   Psychiatric:         Mood and Affect: Mood normal.       Administrative Statements           "

## 2024-08-22 ENCOUNTER — OFFICE VISIT (OUTPATIENT)
Dept: CARDIOLOGY CLINIC | Facility: CLINIC | Age: 69
End: 2024-08-22
Payer: COMMERCIAL

## 2024-08-22 VITALS
OXYGEN SATURATION: 96 % | HEIGHT: 67 IN | HEART RATE: 78 BPM | SYSTOLIC BLOOD PRESSURE: 110 MMHG | BODY MASS INDEX: 33.9 KG/M2 | DIASTOLIC BLOOD PRESSURE: 64 MMHG | WEIGHT: 216 LBS

## 2024-08-22 DIAGNOSIS — I77.810 MILD ASCENDING AORTA DILATATION (HCC): ICD-10-CM

## 2024-08-22 DIAGNOSIS — E78.00 PURE HYPERCHOLESTEROLEMIA: ICD-10-CM

## 2024-08-22 DIAGNOSIS — R00.2 PALPITATIONS: ICD-10-CM

## 2024-08-22 DIAGNOSIS — I10 ESSENTIAL (PRIMARY) HYPERTENSION: Primary | ICD-10-CM

## 2024-08-22 PROCEDURE — 99214 OFFICE O/P EST MOD 30 MIN: CPT | Performed by: INTERNAL MEDICINE

## 2024-08-22 NOTE — PATIENT INSTRUCTIONS
I will continue the patient's current medical regimen.  The patient appears well compensated.  I have asked the patient to call if there is a problem in the interim otherwise I will see the patient in 12 months time. The patient is due for an echo prior to the next visit to assess LV wall thickness and systolic function and ascending aortic dimension.

## 2024-09-05 ENCOUNTER — APPOINTMENT (EMERGENCY)
Dept: RADIOLOGY | Facility: HOSPITAL | Age: 69
DRG: 176 | End: 2024-09-05
Payer: COMMERCIAL

## 2024-09-05 ENCOUNTER — APPOINTMENT (EMERGENCY)
Dept: CT IMAGING | Facility: HOSPITAL | Age: 69
DRG: 176 | End: 2024-09-05
Payer: COMMERCIAL

## 2024-09-05 ENCOUNTER — HOSPITAL ENCOUNTER (INPATIENT)
Facility: HOSPITAL | Age: 69
LOS: 2 days | Discharge: HOME | DRG: 176 | End: 2024-09-09
Attending: EMERGENCY MEDICINE | Admitting: INTERNAL MEDICINE
Payer: COMMERCIAL

## 2024-09-05 ENCOUNTER — OFFICE VISIT (OUTPATIENT)
Dept: URGENT CARE | Facility: CLINIC | Age: 69
End: 2024-09-05
Payer: COMMERCIAL

## 2024-09-05 VITALS
TEMPERATURE: 98.8 F | HEIGHT: 67 IN | SYSTOLIC BLOOD PRESSURE: 130 MMHG | BODY MASS INDEX: 33.9 KG/M2 | HEART RATE: 87 BPM | DIASTOLIC BLOOD PRESSURE: 78 MMHG | RESPIRATION RATE: 18 BRPM | OXYGEN SATURATION: 97 % | WEIGHT: 216 LBS

## 2024-09-05 DIAGNOSIS — I10 HYPERTENSION, UNSPECIFIED TYPE: ICD-10-CM

## 2024-09-05 DIAGNOSIS — I26.99 BILATERAL PULMONARY EMBOLISM (HCC): Primary | ICD-10-CM

## 2024-09-05 DIAGNOSIS — R07.81 RIB PAIN ON LEFT SIDE: Primary | ICD-10-CM

## 2024-09-05 DIAGNOSIS — I26.99 OTHER ACUTE PULMONARY EMBOLISM WITHOUT ACUTE COR PULMONALE (HCC): ICD-10-CM

## 2024-09-05 DIAGNOSIS — E87.6 HYPOKALEMIA: ICD-10-CM

## 2024-09-05 LAB
2HR DELTA HS TROPONIN: 0 NG/L
ALBUMIN SERPL BCG-MCNC: 3.9 G/DL (ref 3.5–5)
ALP SERPL-CCNC: 72 U/L (ref 34–104)
ALT SERPL W P-5'-P-CCNC: 16 U/L (ref 7–52)
ANION GAP SERPL CALCULATED.3IONS-SCNC: 7 MMOL/L (ref 4–13)
AST SERPL W P-5'-P-CCNC: 14 U/L (ref 13–39)
BASOPHILS # BLD AUTO: 0.05 THOUSANDS/ÂΜL (ref 0–0.1)
BASOPHILS NFR BLD AUTO: 1 % (ref 0–1)
BILIRUB SERPL-MCNC: 1.36 MG/DL (ref 0.2–1)
BILIRUB UR QL STRIP: NEGATIVE
BUN SERPL-MCNC: 17 MG/DL (ref 5–25)
CALCIUM SERPL-MCNC: 9.5 MG/DL (ref 8.4–10.2)
CARDIAC TROPONIN I PNL SERPL HS: 4 NG/L
CARDIAC TROPONIN I PNL SERPL HS: 4 NG/L
CHLORIDE SERPL-SCNC: 101 MMOL/L (ref 96–108)
CLARITY UR: CLEAR
CO2 SERPL-SCNC: 28 MMOL/L (ref 21–32)
COLOR UR: YELLOW
CREAT SERPL-MCNC: 0.95 MG/DL (ref 0.6–1.3)
D DIMER PPP FEU-MCNC: 1.74 UG/ML FEU
EOSINOPHIL # BLD AUTO: 0.11 THOUSAND/ÂΜL (ref 0–0.61)
EOSINOPHIL NFR BLD AUTO: 1 % (ref 0–6)
ERYTHROCYTE [DISTWIDTH] IN BLOOD BY AUTOMATED COUNT: 13.4 % (ref 11.6–15.1)
GFR SERPL CREATININE-BSD FRML MDRD: 81 ML/MIN/1.73SQ M
GLUCOSE SERPL-MCNC: 135 MG/DL (ref 65–140)
GLUCOSE UR STRIP-MCNC: NEGATIVE MG/DL
HCT VFR BLD AUTO: 42.7 % (ref 36.5–49.3)
HGB BLD-MCNC: 15.1 G/DL (ref 12–17)
HGB UR QL STRIP.AUTO: NEGATIVE
IMM GRANULOCYTES # BLD AUTO: 0.03 THOUSAND/UL (ref 0–0.2)
IMM GRANULOCYTES NFR BLD AUTO: 0 % (ref 0–2)
KETONES UR STRIP-MCNC: NEGATIVE MG/DL
LEUKOCYTE ESTERASE UR QL STRIP: NEGATIVE
LYMPHOCYTES # BLD AUTO: 1.48 THOUSANDS/ÂΜL (ref 0.6–4.47)
LYMPHOCYTES NFR BLD AUTO: 15 % (ref 14–44)
MCH RBC QN AUTO: 32.3 PG (ref 26.8–34.3)
MCHC RBC AUTO-ENTMCNC: 35.4 G/DL (ref 31.4–37.4)
MCV RBC AUTO: 91 FL (ref 82–98)
MONOCYTES # BLD AUTO: 0.85 THOUSAND/ÂΜL (ref 0.17–1.22)
MONOCYTES NFR BLD AUTO: 9 % (ref 4–12)
NEUTROPHILS # BLD AUTO: 7.1 THOUSANDS/ÂΜL (ref 1.85–7.62)
NEUTS SEG NFR BLD AUTO: 74 % (ref 43–75)
NITRITE UR QL STRIP: NEGATIVE
NRBC BLD AUTO-RTO: 0 /100 WBCS
PH UR STRIP.AUTO: 7 [PH]
PLATELET # BLD AUTO: 203 THOUSANDS/UL (ref 149–390)
PMV BLD AUTO: 9.8 FL (ref 8.9–12.7)
POTASSIUM SERPL-SCNC: 3.5 MMOL/L (ref 3.5–5.3)
PROT SERPL-MCNC: 7.3 G/DL (ref 6.4–8.4)
PROT UR STRIP-MCNC: NEGATIVE MG/DL
RBC # BLD AUTO: 4.67 MILLION/UL (ref 3.88–5.62)
SODIUM SERPL-SCNC: 136 MMOL/L (ref 135–147)
SP GR UR STRIP.AUTO: 1.02 (ref 1–1.03)
UROBILINOGEN UR STRIP-ACNC: <2 MG/DL
WBC # BLD AUTO: 9.62 THOUSAND/UL (ref 4.31–10.16)

## 2024-09-05 PROCEDURE — 99284 EMERGENCY DEPT VISIT MOD MDM: CPT

## 2024-09-05 PROCEDURE — 81003 URINALYSIS AUTO W/O SCOPE: CPT | Performed by: EMERGENCY MEDICINE

## 2024-09-05 PROCEDURE — 93005 ELECTROCARDIOGRAM TRACING: CPT

## 2024-09-05 PROCEDURE — 71260 CT THORAX DX C+: CPT

## 2024-09-05 PROCEDURE — 85379 FIBRIN DEGRADATION QUANT: CPT | Performed by: EMERGENCY MEDICINE

## 2024-09-05 PROCEDURE — 84484 ASSAY OF TROPONIN QUANT: CPT | Performed by: EMERGENCY MEDICINE

## 2024-09-05 PROCEDURE — 99223 1ST HOSP IP/OBS HIGH 75: CPT | Performed by: PHYSICIAN ASSISTANT

## 2024-09-05 PROCEDURE — 74177 CT ABD & PELVIS W/CONTRAST: CPT

## 2024-09-05 PROCEDURE — 71046 X-RAY EXAM CHEST 2 VIEWS: CPT

## 2024-09-05 PROCEDURE — 99285 EMERGENCY DEPT VISIT HI MDM: CPT | Performed by: EMERGENCY MEDICINE

## 2024-09-05 PROCEDURE — S9083 URGENT CARE CENTER GLOBAL: HCPCS

## 2024-09-05 PROCEDURE — 80053 COMPREHEN METABOLIC PANEL: CPT | Performed by: EMERGENCY MEDICINE

## 2024-09-05 PROCEDURE — 85025 COMPLETE CBC W/AUTO DIFF WBC: CPT | Performed by: EMERGENCY MEDICINE

## 2024-09-05 PROCEDURE — 36415 COLL VENOUS BLD VENIPUNCTURE: CPT

## 2024-09-05 PROCEDURE — G0382 LEV 3 HOSP TYPE B ED VISIT: HCPCS

## 2024-09-05 PROCEDURE — 71275 CT ANGIOGRAPHY CHEST: CPT

## 2024-09-05 RX ORDER — ACETAMINOPHEN 325 MG/1
650 TABLET ORAL EVERY 6 HOURS PRN
Status: DISCONTINUED | OUTPATIENT
Start: 2024-09-05 | End: 2024-09-09 | Stop reason: HOSPADM

## 2024-09-05 RX ORDER — OXYCODONE HYDROCHLORIDE 10 MG/1
10 TABLET ORAL EVERY 4 HOURS PRN
Status: DISCONTINUED | OUTPATIENT
Start: 2024-09-05 | End: 2024-09-09 | Stop reason: HOSPADM

## 2024-09-05 RX ORDER — CALCIUM CARBONATE 500 MG/1
500 TABLET, CHEWABLE ORAL 2 TIMES DAILY PRN
Status: DISCONTINUED | OUTPATIENT
Start: 2024-09-05 | End: 2024-09-09 | Stop reason: HOSPADM

## 2024-09-05 RX ORDER — CHLORTHALIDONE 25 MG/1
25 TABLET ORAL DAILY
Status: DISCONTINUED | OUTPATIENT
Start: 2024-09-06 | End: 2024-09-08

## 2024-09-05 RX ORDER — ONDANSETRON 2 MG/ML
4 INJECTION INTRAMUSCULAR; INTRAVENOUS EVERY 6 HOURS PRN
Status: DISCONTINUED | OUTPATIENT
Start: 2024-09-05 | End: 2024-09-09 | Stop reason: HOSPADM

## 2024-09-05 RX ORDER — ENOXAPARIN SODIUM 100 MG/ML
1 INJECTION SUBCUTANEOUS EVERY 12 HOURS SCHEDULED
Status: DISCONTINUED | OUTPATIENT
Start: 2024-09-05 | End: 2024-09-09 | Stop reason: HOSPADM

## 2024-09-05 RX ORDER — HYDROMORPHONE HCL/PF 1 MG/ML
0.5 SYRINGE (ML) INJECTION EVERY 4 HOURS PRN
Status: DISCONTINUED | OUTPATIENT
Start: 2024-09-05 | End: 2024-09-07

## 2024-09-05 RX ORDER — OXYCODONE HYDROCHLORIDE 5 MG/1
5 TABLET ORAL EVERY 4 HOURS PRN
Status: DISCONTINUED | OUTPATIENT
Start: 2024-09-05 | End: 2024-09-09 | Stop reason: HOSPADM

## 2024-09-05 RX ORDER — LANOLIN ALCOHOL/MO/W.PET/CERES
6 CREAM (GRAM) TOPICAL
Status: DISCONTINUED | OUTPATIENT
Start: 2024-09-05 | End: 2024-09-09 | Stop reason: HOSPADM

## 2024-09-05 RX ORDER — LOSARTAN POTASSIUM 50 MG/1
50 TABLET ORAL DAILY
Status: DISCONTINUED | OUTPATIENT
Start: 2024-09-06 | End: 2024-09-09 | Stop reason: HOSPADM

## 2024-09-05 RX ADMIN — IOHEXOL 85 ML: 350 INJECTION, SOLUTION INTRAVENOUS at 17:20

## 2024-09-05 RX ADMIN — ENOXAPARIN SODIUM 100 MG: 100 INJECTION SUBCUTANEOUS at 21:40

## 2024-09-05 RX ADMIN — IOHEXOL 100 ML: 350 INJECTION, SOLUTION INTRAVENOUS at 13:37

## 2024-09-05 NOTE — PROGRESS NOTES
Ambulatory Visit  Name: Danilo Padilla Jr.      : 1955      MRN: [id number]  Encounter Provider: MAGDALENA Payne  Encounter Date: 2024   Encounter department: [facility]    Assessment & Plan   1. Rib pain on left side  -     Transfer to other facility      History of Present Illness     Danilo Padilla Jr. is a 69 y.o. male with PMH of PE, thoracic aortic aneurysm, and kidney stones who presents with sudden onset left sided rib pain since Tuesday. Pain is intermittent and sharp upon inhalation. Pain does not radiate. Denies trauma to the area. Denies SOB, chest pain, cough, palpitations, nausea, or vomiting. Patient has not tried anything to help the pain.          Review of Systems   Constitutional:  Negative for chills, fatigue and fever.   Respiratory:  Negative for cough and shortness of breath.    Cardiovascular:  Positive for leg swelling (chronic). Negative for chest pain and palpitations.        Left sided rib pain   Musculoskeletal:  Negative for back pain.         Physical Exam  Vitals and nursing note reviewed.   Constitutional:       General: He is not in acute distress.     Appearance: Normal appearance. He is not ill-appearing.   HENT:      Head: Normocephalic and atraumatic.   Cardiovascular:      Rate and Rhythm: Normal rate and regular rhythm.      Heart sounds: Normal heart sounds. No murmur heard.     No gallop.   Pulmonary:      Effort: Pulmonary effort is normal.      Breath sounds: Normal breath sounds.   Chest:      Comments: Left side pain not reproducible   Musculoskeletal:      Right lower le+ Edema present.      Left lower le+ Edema present.   Skin:     General: Skin is warm and dry.   Neurological:      Mental Status: He is alert and oriented to person, place, and time.

## 2024-09-05 NOTE — H&P
[facility]  H&P  Name: Danilo Padilla Jr. 69 y.o. male I MRN: [id number]  Unit/Bed#: OVR 04 I Date of Admission: 9/5/2024   Date of Service: 9/5/2024 I Hospital Day: 0      Assessment & Plan   * Acute pulmonary embolism without acute cor pulmonale (HCC)  Assessment & Plan  Patient presents with right-sided rib pain.  Noted the last day or 2 he has had left-sided shoulder pain.  CTA chest reveals Segmental and subsegmental emboli in the right middle lobe and both lower lobes with mild clot burden.   No evidence of strain on imaging  He has IVC filter in place for the last 20 years.  Was previously on Coumadin 20 years ago.  Obtain upper extremity duplex ultrasound  Obtain echo and monitor on telemetry  Start therapeutic Lovenox every 12 tonight.  Will need to go on NOAC tomorrow.  Case management consult for price check  Admit for observation  Outpatient hematology consult    Hypertension  Assessment & Plan  Resume home chlorthalidone and losartan  Monitor blood pressure    GERD (gastroesophageal reflux disease)  Assessment & Plan  Continue PPI         VTE Pharmacologic Prophylaxis: VTE Score: 6 High Risk (Score >/= 5) - Pharmacological DVT Prophylaxis Ordered: enoxaparin (Lovenox). Sequential Compression Devices Ordered.  Code Status:full code     Anticipated Length of Stay: Patient will be admitted on an observation basis with an anticipated length of stay of less than 2 midnights secondary to pulmonary emboli, needing workup and Lovenox injection prior to starting oral anticoagulation.    Total Time for Visit, including Counseling / Coordination of Care: 75 Minutes. Greater than 50% of this total time spent on direct patient counseling and coordination of care.    Chief Complaint: Rib pain    History of Present Illness:  Danilo Padilla Jr. is a 69 y.o. male with a PMH of prior DVT 20 years ago with IVC filter in place, hypertension who presents with chief complaint of rib pain.  Tells me  that every time he takes deep breath he has right-sided rib pain.  Pain is pleuritic in nature.  He also has had left shoulder pain last few days but is unsure if this is related.  No arm swelling presently.  Has IVC filter in place and was previously on Coumadin approximately 20 years ago.  He is not maintained on any anticoagulants today after his IVC has been placed.  No shortness of breath without exertion.  Denying chest pain presently.  No leg swelling or leg pain, no recent trauma or long travel.    Review of Systems:  Review of Systems   Constitutional:  Negative for activity change, appetite change, chills, fatigue and fever.   HENT:  Negative for congestion, rhinorrhea, sinus pressure and sore throat.    Eyes:  Negative for photophobia, pain and visual disturbance.   Respiratory:  Negative for cough, shortness of breath and wheezing.    Cardiovascular:  Positive for chest pain (R Rib pain). Negative for palpitations and leg swelling.   Gastrointestinal:  Negative for abdominal distention, abdominal pain, constipation, diarrhea, nausea and vomiting.   Endocrine: Negative for cold intolerance, heat intolerance, polydipsia and polyuria.   Genitourinary:  Negative for difficulty urinating, dysuria, flank pain, frequency and hematuria.   Musculoskeletal:  Negative for arthralgias, back pain and joint swelling.   Skin:  Negative for color change, pallor and rash.   Allergic/Immunologic: Negative.    Neurological:  Negative for dizziness, syncope, weakness, light-headedness and headaches.   Hematological: Negative.    Psychiatric/Behavioral: Negative.         Past Medical and Surgical History:   Past Medical History:   Diagnosis Date    GERD (gastroesophageal reflux disease)     Hyperlipidemia     Hypertension     Kidney stone     Meniere disease     Pulmonary embolism (HCC)     Thoracic aortic aneurysm (HCC)        Past Surgical History:   Procedure Laterality Date    ABDOMINAL SURGERY      BACK SURGERY       COCHLEAR IMPLANT Right     COLONOSCOPY      CT NEEDLE BIOPSY MEDIASTINUM  5/17/2019    FL INJECTION LEFT SHOULDER (ARTHROGRAM)  01/19/2022    HERNIA REPAIR      IVC FILTER INSERTION         Meds/Allergies:  Prior to Admission medications    Medication Sig Start Date End Date Taking? Authorizing Provider   chlorthalidone 25 mg tablet TAKE 1 TABLET (25 MG TOTAL) BY MOUTH DAILY. 5/24/24   Rodolfo Valle MD   ezetimibe (ZETIA) 10 mg tablet Take 10 mg by mouth every other day  Patient not taking: Reported on 7/12/2024    Historical Provider, MD   Klor-Con M20 20 MEQ tablet TAKE 1 TABLET BY MOUTH TWICE A DAY 5/24/24   Rodolfo Valle MD   losartan (COZAAR) 50 mg tablet TAKE 1 TABLET BY MOUTH EVERY DAY 6/11/24   Rodolfo Valle MD   magnesium (MAGTAB) 84 MG (7MEQ) TBCR Take 84 mg by mouth daily    Historical Provider, MD   Multiple Vitamin (multivitamin) tablet Take 1 tablet by mouth daily    Historical Provider, MD   Nutritional Supplements (THERALITH XR) TABS Take by mouth 2 (two) times a day 2 tablets BID  Patient not taking: Reported on 7/12/2024    Historical Provider, MD   Omega-3 Fatty Acids (Fish Oil) 300 MG CAPS Take 2 g by mouth daily  Patient not taking: Reported on 9/5/2024    Historical Provider, MD   omeprazole (PriLOSEC) 20 mg delayed release capsule Take 20 mg by mouth daily  Patient not taking: Reported on 9/5/2024    Historical Provider, MD       All medications reviewed.    Allergies:   Allergies   Allergen Reactions    Moxifloxacin Shortness Of Breath and Hives    Alcohol - Food Allergy      vertigo    Caffeine - Food Allergy Other (See Comments)     vertigo    Cephalosporins      hyperventilation    Doxycycline     Penicillins Hives    Pravastatin      Other reaction(s): Respiratory Distress    Prednisone Tinnitus     Other reaction(s): Unknown    Tamiflu [Oseltamivir] Lightheadedness     Hyperventilation    Claritin [Loratadine] Anxiety       Social History:  Marital Status:       Substance Use History:   Social History     Substance and Sexual Activity   Alcohol Use No     Social History     Tobacco Use   Smoking Status Never    Passive exposure: Never   Smokeless Tobacco Never     Social History     Substance and Sexual Activity   Drug Use No       Family History:  Non-contributory    Physical Exam:     Vitals:   Blood Pressure: 134/84 (09/05/24 1915)  Pulse: 83 (09/05/24 1915)  Temperature: (!) 97.4 °F (36.3 °C) (09/05/24 1133)  Respirations: 18 (09/05/24 1915)  SpO2: 96 % (09/05/24 1915)    Physical Exam  Vitals and nursing note reviewed.   Constitutional:       General: He is not in acute distress.     Appearance: Normal appearance.   HENT:      Head: Normocephalic and atraumatic.      Mouth/Throat:      Mouth: Mucous membranes are moist.   Eyes:      General: No scleral icterus.     Extraocular Movements: Extraocular movements intact.      Pupils: Pupils are equal, round, and reactive to light.   Cardiovascular:      Rate and Rhythm: Normal rate and regular rhythm.      Heart sounds: Normal heart sounds. No murmur heard.     No friction rub.   Pulmonary:      Effort: Pulmonary effort is normal.      Breath sounds: Normal breath sounds. No wheezing or rhonchi.   Abdominal:      General: Bowel sounds are normal. There is no distension.      Palpations: Abdomen is soft.      Tenderness: There is no abdominal tenderness. There is no guarding.   Musculoskeletal:         General: No swelling.      Cervical back: Neck supple.      Right lower leg: No edema.      Left lower leg: No edema.   Skin:     General: Skin is warm and dry.      Capillary Refill: Capillary refill takes less than 2 seconds.      Coloration: Skin is not jaundiced or pale.   Neurological:      General: No focal deficit present.      Mental Status: He is alert and oriented to person, place, and time. Mental status is at baseline.          Additional Data:     Lab Results:  Results from last 7 days   Lab Units  09/05/24  1138   WBC Thousand/uL 9.62   HEMOGLOBIN g/dL 15.1   HEMATOCRIT % 42.7   PLATELETS Thousands/uL 203   SEGS PCT % 74   LYMPHO PCT % 15   MONO PCT % 9   EOS PCT % 1     Results from last 7 days   Lab Units 09/05/24  1138   SODIUM mmol/L 136   POTASSIUM mmol/L 3.5   CHLORIDE mmol/L 101   CO2 mmol/L 28   BUN mg/dL 17   CREATININE mg/dL 0.95   ANION GAP mmol/L 7   CALCIUM mg/dL 9.5   ALBUMIN g/dL 3.9   TOTAL BILIRUBIN mg/dL 1.36*   ALK PHOS U/L 72   ALT U/L 16   AST U/L 14   GLUCOSE RANDOM mg/dL 135                       Imaging: All pertinent imaging reviewed.  CTA ED chest PE study   Final Result by Tamiko Early MD (09/05 8047)      Segmental and subsegmental emboli in the right middle lobe and both lower lobes with mild clot burden.      RV/LV diameter ratio less than 1 with nothing to indicate right heart strain.      Small left pleural effusion.         I notified Dr. RITIKA KAY on 9/5/2024 6:36 PM by epic secure chat and he responded immediately.                  Workstation performed: [id number]         CT chest abdomen pelvis w contrast   Final Result by Leonardo Roberts MD (09/05 1656)   Addendum (preliminary) 1 of 1 by Leonardo Roberts MD (09/05 1656)   ADDENDUM:      Possible filling defect in a proximal segmental branch of the pulmonary    artery in the right middle lobe (series 2, image 67). Hypoattenuation    within a segmental branch of the pulmonary artery in the right lower lobe    (series 2, image 78). However,    technique is suboptimal for assessment for pulmonary emboli. These    findings were discussed with Dr. Kay at 16:50.      Final   1.  New trace left pleural effusion.   2.  No acute abdominopelvic pathology.   3.  Multiple bilateral renal calculi measuring up to 10 mm. No hydroureteronephrosis.                  Workstation performed: [id number]         XR chest 2 views   Final Result by Robles Alatorre MD (09/05 8158)   Small left basilar pleural effusion with lungs  otherwise clear.            Workstation performed: [id number]         VAS upper limb arterial duplex, complete bilateral, graft    (Results Pending)       EKG and Other Studies Reviewed on Admission:   Prior pertinent studies and records reviewed in [facility]/Care Everywhere.    ** Please Note: This note has been constructed using a voice recognition system. **

## 2024-09-05 NOTE — ED PROVIDER NOTES
History  Chief Complaint   Patient presents with    Rib Pain     Pt said he has left sided rib pain since Tuesday, pt states its hurt more when he moves or takes a deep breath, pt denies sob/n/v. Pt has increase fatigue.      Danilo Padilla Jr. is a 69 y.o. male with PMH of PE s/p IVC filter, on no anticoagulant. H/O thoracic aortic aneurysm, and kidney stones who presents with sudden onset left anterolateral chest/LUQ pain since Tuesday. Pain is intermittent and sharp upon inhalation. Pain does not radiate, although endorses some left shoulder pain recently. Denies trauma to the area. Denies SOB, anginal-type chest pain, cough, palpitations, nausea, or vomiting. Patient has not tried anything to help the pain.          Prior to Admission Medications   Prescriptions Last Dose Informant Patient Reported? Taking?   Klor-Con M20 20 MEQ tablet 9/5/2024 Self No Yes   Sig: TAKE 1 TABLET BY MOUTH TWICE A DAY   Multiple Vitamin (multivitamin) tablet 9/4/2024 Self Yes Yes   Sig: Take 1 tablet by mouth daily   Nutritional Supplements (THERALITH XR) TABS Not Taking Self Yes No   Sig: Take by mouth 2 (two) times a day 2 tablets BID   Patient not taking: Reported on 7/12/2024   Omega-3 Fatty Acids (Fish Oil) 300 MG CAPS Not Taking Self Yes No   Sig: Take 2 g by mouth daily   Patient not taking: Reported on 9/5/2024   chlorthalidone 25 mg tablet 9/5/2024 Self No Yes   Sig: TAKE 1 TABLET (25 MG TOTAL) BY MOUTH DAILY.   ezetimibe (ZETIA) 10 mg tablet Not Taking Self Yes No   Sig: Take 10 mg by mouth every other day   Patient not taking: Reported on 7/12/2024   losartan (COZAAR) 50 mg tablet 9/5/2024 Self No Yes   Sig: TAKE 1 TABLET BY MOUTH EVERY DAY   magnesium (MAGTAB) 84 MG (7MEQ) TBCR 9/4/2024 Self Yes Yes   Sig: Take 84 mg by mouth daily   omeprazole (PriLOSEC) 20 mg delayed release capsule Not Taking Self Yes No   Sig: Take 20 mg by mouth daily   Patient not taking: Reported on 9/5/2024      Facility-Administered Medications:  None       Past Medical History:   Diagnosis Date    GERD (gastroesophageal reflux disease)     Hyperlipidemia     Hypertension     Kidney stone     Meniere disease     Pulmonary embolism (HCC)     Thoracic aortic aneurysm (HCC)        Past Surgical History:   Procedure Laterality Date    ABDOMINAL SURGERY      BACK SURGERY      COCHLEAR IMPLANT Right     COLONOSCOPY      CT NEEDLE BIOPSY MEDIASTINUM  5/17/2019    FL INJECTION LEFT SHOULDER (ARTHROGRAM)  01/19/2022    HERNIA REPAIR      IVC FILTER INSERTION         Family History   Problem Relation Age of Onset    Hypertension Mother     Hyperlipidemia Mother     Hypertension Father     Hyperlipidemia Father      I have reviewed and agree with the history as documented.    E-Cigarette/Vaping    E-Cigarette Use Never User      E-Cigarette/Vaping Substances    Nicotine No     THC No     CBD No     Flavoring No     Other No     Unknown No      Social History     Tobacco Use    Smoking status: Never     Passive exposure: Never    Smokeless tobacco: Never   Vaping Use    Vaping status: Never Used   Substance Use Topics    Alcohol use: Never    Drug use: No       Review of Systems   Constitutional:  Negative for fever.   Respiratory:  Negative for cough and shortness of breath.    Cardiovascular:  Positive for chest pain (left anterolateral, inferior). Negative for palpitations.   Gastrointestinal:  Negative for abdominal pain.   Neurological:  Negative for syncope and light-headedness.       Physical Exam  Physical Exam  Vitals and nursing note reviewed.   Constitutional:       General: He is not in acute distress.     Appearance: He is well-developed and normal weight. He is not ill-appearing or diaphoretic.   HENT:      Head: Normocephalic and atraumatic.      Right Ear: External ear normal.      Left Ear: External ear normal.   Eyes:      General: No scleral icterus.     Conjunctiva/sclera: Conjunctivae normal.   Neck:      Vascular: No JVD.   Cardiovascular:       Rate and Rhythm: Normal rate and regular rhythm.      Pulses: Normal pulses.      Heart sounds: Normal heart sounds.   Pulmonary:      Effort: Pulmonary effort is normal. No respiratory distress.      Breath sounds: Normal breath sounds.   Abdominal:      General: Bowel sounds are normal.      Palpations: Abdomen is soft. There is no mass.      Tenderness: There is no abdominal tenderness.   Musculoskeletal:         General: No tenderness. Normal range of motion.      Cervical back: Neck supple.      Right lower leg: No edema.      Left lower leg: No edema.   Skin:     General: Skin is warm and dry.      Capillary Refill: Capillary refill takes less than 2 seconds.      Findings: No rash.   Neurological:      General: No focal deficit present.      Mental Status: He is alert and oriented to person, place, and time. Mental status is at baseline.      Cranial Nerves: No cranial nerve deficit.      Coordination: Coordination normal.      Deep Tendon Reflexes: Reflexes are normal and symmetric.   Psychiatric:         Mood and Affect: Mood normal.         Behavior: Behavior normal.         Vital Signs  ED Triage Vitals   Temperature Pulse Respirations Blood Pressure SpO2   09/05/24 1133 09/05/24 1133 09/05/24 1133 09/05/24 1133 09/05/24 1133   (!) 97.4 °F (36.3 °C) (!) 54 18 133/86 97 %      Temp Source Heart Rate Source Patient Position - Orthostatic VS BP Location FiO2 (%)   09/05/24 2027 09/05/24 1744 09/05/24 1744 09/05/24 1744 --   Oral Monitor Sitting Right arm       Pain Score       09/05/24 1133       4           Vitals:    09/07/24 2215 09/08/24 0751 09/08/24 1434 09/09/24 0705   BP: 121/72 130/72 105/66 132/74   Pulse: 78 66  81   Patient Position - Orthostatic VS:   Sitting          Visual Acuity      ED Medications  Medications   enoxaparin (LOVENOX) subcutaneous injection 100 mg (100 mg Subcutaneous Given 9/8/24 2147)   losartan (COZAAR) tablet 50 mg (50 mg Oral Given 9/8/24 0817)   ondansetron (ZOFRAN)  injection 4 mg (has no administration in time range)   acetaminophen (TYLENOL) tablet 650 mg (has no administration in time range)   oxyCODONE (ROXICODONE) immediate release tablet 10 mg (has no administration in time range)   oxyCODONE (ROXICODONE) IR tablet 5 mg (has no administration in time range)   melatonin tablet 6 mg (6 mg Oral Not Given 9/8/24 2147)   calcium carbonate (TUMS) chewable tablet 500 mg (has no administration in time range)   enoxaparin (LOVENOX) injection 96 mg (has no administration in time range)   chlorthalidone tablet 12.5 mg (has no administration in time range)   potassium chloride (Klor-Con M20) CR tablet 40 mEq (has no administration in time range)   iohexol (OMNIPAQUE) 350 MG/ML injection (MULTI-DOSE) 100 mL (100 mL Intravenous Given 9/5/24 1337)   iohexol (OMNIPAQUE) 350 MG/ML injection (MULTI-DOSE) 100 mL (85 mL Intravenous Given 9/5/24 1720)   warfarin (COUMADIN) tablet 8 mg (8 mg Oral Given 9/6/24 1710)   potassium chloride (Klor-Con M20) CR tablet 40 mEq (40 mEq Oral Given 9/7/24 0846)   warfarin (COUMADIN) tablet 10 mg (10 mg Oral Given 9/7/24 1743)   potassium chloride (Klor-Con M20) CR tablet 40 mEq (40 mEq Oral Given 9/8/24 1708)   potassium chloride 20 mEq IVPB (premix) (20 mEq Intravenous New Bag 9/8/24 0926)   warfarin (COUMADIN) tablet 10 mg (10 mg Oral Given 9/8/24 1709)       Diagnostic Studies  Results Reviewed       Procedure Component Value Units Date/Time    Basic metabolic panel [[id number]]  (Abnormal) Collected: 09/06/24 0430    Lab Status: Final result Specimen: Blood from Arm, Left Updated: 09/06/24 0535     Sodium 135 mmol/L      Potassium 3.1 mmol/L      Chloride 100 mmol/L      CO2 26 mmol/L      ANION GAP 9 mmol/L      BUN 20 mg/dL      Creatinine 1.12 mg/dL      Glucose 170 mg/dL      Calcium 9.4 mg/dL      eGFR 66 ml/min/1.73sq m     Narrative:      National Kidney Disease Foundation guidelines for Chronic Kidney Disease (CKD):     Stage 1 with normal or  high GFR (GFR > 90 mL/min/1.73 square meters)    Stage 2 Mild CKD (GFR = 60-89 mL/min/1.73 square meters)    Stage 3A Moderate CKD (GFR = 45-59 mL/min/1.73 square meters)    Stage 3B Moderate CKD (GFR = 30-44 mL/min/1.73 square meters)    Stage 4 Severe CKD (GFR = 15-29 mL/min/1.73 square meters)    Stage 5 End Stage CKD (GFR <15 mL/min/1.73 square meters)  Note: GFR calculation is accurate only with a steady state creatinine    CBC and differential [[id number]] Collected: 09/06/24 0430    Lab Status: Final result Specimen: Blood from Arm, Left Updated: 09/06/24 0512     WBC 8.94 Thousand/uL      RBC 4.66 Million/uL      Hemoglobin 14.8 g/dL      Hematocrit 42.8 %      MCV 92 fL      MCH 31.8 pg      MCHC 34.6 g/dL      RDW 13.5 %      MPV 10.2 fL      Platelets 212 Thousands/uL      nRBC 0 /100 WBCs      Segmented % 73 %      Immature Grans % 0 %      Lymphocytes % 15 %      Monocytes % 10 %      Eosinophils Relative 1 %      Basophils Relative 1 %      Absolute Neutrophils 6.59 Thousands/µL      Absolute Immature Grans 0.03 Thousand/uL      Absolute Lymphocytes 1.35 Thousands/µL      Absolute Monocytes 0.86 Thousand/µL      Eosinophils Absolute 0.06 Thousand/µL      Basophils Absolute 0.05 Thousands/µL     D-dimer, quantitative [[id number]]  (Abnormal) Collected: 09/05/24 1138    Lab Status: Final result Specimen: Blood from Arm, Right Updated: 09/05/24 1509     D-Dimer, Quant 1.74 ug/ml FEU     Narrative:      In the evaluation for possible pulmonary embolism, in the appropriate (Well's Score of 4 or less) patient, the age adjusted d-dimer cutoff for this patient can be calculated as:    Age x 0.01 (in ug/mL) for Age-adjusted D-dimer exclusion threshold for a patient over 50 years.    HS Troponin I 2hr [[id number]]  (Normal) Collected: 09/05/24 1311    Lab Status: Final result Specimen: Blood from Arm, Right Updated: 09/05/24 1340     hs TnI 2hr 4 ng/L      Delta 2hr hsTnI 0 ng/L     UA (URINE) with reflex to Scope  [[id number]] Collected: 09/05/24 1310    Lab Status: Final result Specimen: Urine, Clean Catch Updated: 09/05/24 1324     Color, UA Yellow     Clarity, UA Clear     Specific Gravity, UA 1.020     pH, UA 7.0     Leukocytes, UA Negative     Nitrite, UA Negative     Protein, UA Negative mg/dl      Glucose, UA Negative mg/dl      Ketones, UA Negative mg/dl      Urobilinogen, UA <2.0 mg/dl      Bilirubin, UA Negative     Occult Blood, UA Negative    HS Troponin 0hr (reflex protocol) [[id number]]  (Normal) Collected: 09/05/24 1138    Lab Status: Final result Specimen: Blood from Arm, Right Updated: 09/05/24 1205     hs TnI 0hr 4 ng/L     Comprehensive metabolic panel [[id number]]  (Abnormal) Collected: 09/05/24 1138    Lab Status: Final result Specimen: Blood from Arm, Right Updated: 09/05/24 1156     Sodium 136 mmol/L      Potassium 3.5 mmol/L      Chloride 101 mmol/L      CO2 28 mmol/L      ANION GAP 7 mmol/L      BUN 17 mg/dL      Creatinine 0.95 mg/dL      Glucose 135 mg/dL      Calcium 9.5 mg/dL      AST 14 U/L      ALT 16 U/L      Alkaline Phosphatase 72 U/L      Total Protein 7.3 g/dL      Albumin 3.9 g/dL      Total Bilirubin 1.36 mg/dL      eGFR 81 ml/min/1.73sq m     Narrative:      National Kidney Disease Foundation guidelines for Chronic Kidney Disease (CKD):     Stage 1 with normal or high GFR (GFR > 90 mL/min/1.73 square meters)    Stage 2 Mild CKD (GFR = 60-89 mL/min/1.73 square meters)    Stage 3A Moderate CKD (GFR = 45-59 mL/min/1.73 square meters)    Stage 3B Moderate CKD (GFR = 30-44 mL/min/1.73 square meters)    Stage 4 Severe CKD (GFR = 15-29 mL/min/1.73 square meters)    Stage 5 End Stage CKD (GFR <15 mL/min/1.73 square meters)  Note: GFR calculation is accurate only with a steady state creatinine    CBC and differential [[id number]] Collected: 09/05/24 1138    Lab Status: Final result Specimen: Blood from Arm, Right Updated: 09/05/24 1142     WBC 9.62 Thousand/uL      RBC 4.67 Million/uL      Hemoglobin  15.1 g/dL      Hematocrit 42.7 %      MCV 91 fL      MCH 32.3 pg      MCHC 35.4 g/dL      RDW 13.4 %      MPV 9.8 fL      Platelets 203 Thousands/uL      nRBC 0 /100 WBCs      Segmented % 74 %      Immature Grans % 0 %      Lymphocytes % 15 %      Monocytes % 9 %      Eosinophils Relative 1 %      Basophils Relative 1 %      Absolute Neutrophils 7.10 Thousands/µL      Absolute Immature Grans 0.03 Thousand/uL      Absolute Lymphocytes 1.48 Thousands/µL      Absolute Monocytes 0.85 Thousand/µL      Eosinophils Absolute 0.11 Thousand/µL      Basophils Absolute 0.05 Thousands/µL                    VAS upper limb venous duplex scan, unilateral/limited   Final Result by Carlo Aldana MD (09/06 2328)      CTA ED chest PE study   Final Result by Tamiko Early MD (09/05 3337)      Segmental and subsegmental emboli in the right middle lobe and both lower lobes with mild clot burden.      RV/LV diameter ratio less than 1 with nothing to indicate right heart strain.      Small left pleural effusion.         I notified Dr. RITIKA KAY on 9/5/2024 6:36 PM by epic secure chat and he responded immediately.                  Workstation performed: [id number]         CT chest abdomen pelvis w contrast   Final Result by Leonardo Roberts MD (09/05 1656)   Addendum (preliminary) 1 of 1 by Leonardo Roberts MD (09/05 1656)   ADDENDUM:      Possible filling defect in a proximal segmental branch of the pulmonary    artery in the right middle lobe (series 2, image 67). Hypoattenuation    within a segmental branch of the pulmonary artery in the right lower lobe    (series 2, image 78). However,    technique is suboptimal for assessment for pulmonary emboli. These    findings were discussed with Dr. Kay at 16:50.      Final   1.  New trace left pleural effusion.   2.  No acute abdominopelvic pathology.   3.  Multiple bilateral renal calculi measuring up to 10 mm. No hydroureteronephrosis.                  Workstation performed:  [id number]         XR chest 2 views   Final Result by Robles Alatorre MD (09/05 1358)   Small left basilar pleural effusion with lungs otherwise clear.            Workstation performed: [id number]                    Procedures  Procedures         ED Course                                     Wells' Criteria for PE      Flowsheet Row Most Recent Value   Wells' Criteria for PE    Clinical signs and symptoms of DVT 0 Filed at: 09/05/2024 1436   PE is primary diagnosis or equally likely 3 Filed at: 09/05/2024 1436   HR >100 0 Filed at: 09/05/2024 1436   Immobilization at least 3 days or Surgery in the previous 4 weeks 0 Filed at: 09/05/2024 1436   Previous, objectively diagnosed PE or DVT 1.5 Filed at: 09/05/2024 1436   Hemoptysis 0 Filed at: 09/05/2024 1436   Malignancy with treatment within 6 months or palliative 0 Filed at: 09/05/2024 1436   Wells' Criteria Total 4.5 Filed at: 09/05/2024 1436                  Medical Decision Making  69-year-old male with new onset of left lower anterolateral chest/left upper quadrant discomfort with a several days.  Also having some left shoulder discomfort but unsure whether that is related.  Concern for angina, pleurisy, pneumothorax, pulm embolism, aortic aneurysm dissection intra-abdominal infection or infarct, pyelonephritis, hydronephrosis malignancy.  Unlikely zoster or ACS.  Will check labs and imaging.  Patient is stable.    CT reveals multiple bilateral pulmonary emboli.  No evidence of right heart strain on CT.  Will admit anticoagulate.    Amount and/or Complexity of Data Reviewed  External Data Reviewed: notes.  Labs: ordered.  Radiology: ordered.  Discussion of management or test interpretation with external provider(s): D/w radiologist    Risk  Prescription drug management.  Decision regarding hospitalization.                 Disposition  Final diagnoses:   Bilateral pulmonary embolism (HCC)     Time reflects when diagnosis was documented in both MDM as applicable and the  Disposition within this note       Time User Action Codes Description Comment    9/5/2024  7:51 PM John Curran Add [I26.99] Bilateral pulmonary embolism (HCC)           ED Disposition       ED Disposition   Admit    Condition   Stable    Date/Time   u Sep 5, 2024 1950    Comment   Case was discussed with KARLI LOZANO and the patient's admission status was agreed to be Admission Status: observation status to the service of Dr. Zamudio .               Follow-up Information    None         Current Discharge Medication List        CONTINUE these medications which have NOT CHANGED    Details   chlorthalidone 25 mg tablet TAKE 1 TABLET (25 MG TOTAL) BY MOUTH DAILY.  Qty: 90 tablet, Refills: 1    Associated Diagnoses: Hypertension, unspecified type      Klor-Con M20 20 MEQ tablet TAKE 1 TABLET BY MOUTH TWICE A DAY  Qty: 180 tablet, Refills: 1    Associated Diagnoses: Hypokalemia      losartan (COZAAR) 50 mg tablet TAKE 1 TABLET BY MOUTH EVERY DAY  Qty: 90 tablet, Refills: 0    Associated Diagnoses: Hypertension, unspecified type; Essential (primary) hypertension      magnesium (MAGTAB) 84 MG (7MEQ) TBCR Take 84 mg by mouth daily      Multiple Vitamin (multivitamin) tablet Take 1 tablet by mouth daily      ezetimibe (ZETIA) 10 mg tablet Take 10 mg by mouth every other day      Nutritional Supplements (THERALITH XR) TABS Take by mouth 2 (two) times a day 2 tablets BID      Omega-3 Fatty Acids (Fish Oil) 300 MG CAPS Take 2 g by mouth daily      omeprazole (PriLOSEC) 20 mg delayed release capsule Take 20 mg by mouth daily             No discharge procedures on file.    PDMP Review       None            ED Provider  Electronically Signed by             John Curran DO  09/09/24 0851

## 2024-09-05 NOTE — ASSESSMENT & PLAN NOTE
Patient presents with right-sided rib pain.  Noted the last day or 2 he has had left-sided shoulder pain.  CTA chest reveals Segmental and subsegmental emboli in the right middle lobe and both lower lobes with mild clot burden.   No evidence of strain on imaging  He has IVC filter in place for the last 20 years.  Was previously on Coumadin 20 years ago.  Obtain upper extremity duplex ultrasound  Obtain echo and monitor on telemetry  Start therapeutic Lovenox every 12 tonight.  Will need to go on NOAC tomorrow.  Case management consult for price check  Admit for observation  Outpatient hematology consult

## 2024-09-06 ENCOUNTER — APPOINTMENT (OUTPATIENT)
Dept: NON INVASIVE DIAGNOSTICS | Facility: HOSPITAL | Age: 69
DRG: 176 | End: 2024-09-06
Payer: COMMERCIAL

## 2024-09-06 LAB
ANION GAP SERPL CALCULATED.3IONS-SCNC: 9 MMOL/L (ref 4–13)
AORTIC ROOT: 3.7 CM
APICAL FOUR CHAMBER EJECTION FRACTION: 54 %
ASCENDING AORTA: 3.5 CM
ATRIAL RATE: 76 BPM
ATRIAL RATE: 78 BPM
ATRIAL RATE: 86 BPM
BASOPHILS # BLD AUTO: 0.05 THOUSANDS/ÂΜL (ref 0–0.1)
BASOPHILS NFR BLD AUTO: 1 % (ref 0–1)
BSA FOR ECHO PROCEDURE: 2.07 M2
BUN SERPL-MCNC: 20 MG/DL (ref 5–25)
CALCIUM SERPL-MCNC: 9.4 MG/DL (ref 8.4–10.2)
CHLORIDE SERPL-SCNC: 100 MMOL/L (ref 96–108)
CO2 SERPL-SCNC: 26 MMOL/L (ref 21–32)
CREAT SERPL-MCNC: 1.12 MG/DL (ref 0.6–1.3)
DOP CALC LVOT AREA: 4.15 CM2
DOP CALC LVOT DIAMETER: 2.3 CM
E WAVE DECELERATION TIME: 198 MS
E/A RATIO: 0.8
EOSINOPHIL # BLD AUTO: 0.06 THOUSAND/ÂΜL (ref 0–0.61)
EOSINOPHIL NFR BLD AUTO: 1 % (ref 0–6)
ERYTHROCYTE [DISTWIDTH] IN BLOOD BY AUTOMATED COUNT: 13.5 % (ref 11.6–15.1)
FRACTIONAL SHORTENING: 36 (ref 28–44)
GFR SERPL CREATININE-BSD FRML MDRD: 66 ML/MIN/1.73SQ M
GLUCOSE SERPL-MCNC: 170 MG/DL (ref 65–140)
HCT VFR BLD AUTO: 42.8 % (ref 36.5–49.3)
HGB BLD-MCNC: 14.8 G/DL (ref 12–17)
IMM GRANULOCYTES # BLD AUTO: 0.03 THOUSAND/UL (ref 0–0.2)
IMM GRANULOCYTES NFR BLD AUTO: 0 % (ref 0–2)
INR PPP: 1.11 (ref 0.85–1.19)
INTERVENTRICULAR SEPTUM IN DIASTOLE (PARASTERNAL SHORT AXIS VIEW): 0.9 CM
INTERVENTRICULAR SEPTUM: 0.9 CM (ref 0.6–1.1)
LAAS-AP2: 16.8 CM2
LAAS-AP4: 19.6 CM2
LEFT ATRIUM SIZE: 1.9 CM
LEFT ATRIUM VOLUME (MOD BIPLANE): 53 ML
LEFT ATRIUM VOLUME INDEX (MOD BIPLANE): 25.6 ML/M2
LEFT INTERNAL DIMENSION IN SYSTOLE: 2.7 CM (ref 2.1–4)
LEFT VENTRICLE DIASTOLIC VOLUME (MOD BIPLANE): 93 ML
LEFT VENTRICLE DIASTOLIC VOLUME INDEX (MOD BIPLANE): 44.9 ML/M2
LEFT VENTRICLE SYSTOLIC VOLUME (MOD BIPLANE): 41 ML
LEFT VENTRICLE SYSTOLIC VOLUME INDEX (MOD BIPLANE): 19.8 ML/M2
LEFT VENTRICULAR INTERNAL DIMENSION IN DIASTOLE: 4.2 CM (ref 3.5–6)
LEFT VENTRICULAR POSTERIOR WALL IN END DIASTOLE: 1.1 CM
LEFT VENTRICULAR STROKE VOLUME: 51 ML
LV EF: 56 %
LVSV (TEICH): 51 ML
LYMPHOCYTES # BLD AUTO: 1.35 THOUSANDS/ÂΜL (ref 0.6–4.47)
LYMPHOCYTES NFR BLD AUTO: 15 % (ref 14–44)
MCH RBC QN AUTO: 31.8 PG (ref 26.8–34.3)
MCHC RBC AUTO-ENTMCNC: 34.6 G/DL (ref 31.4–37.4)
MCV RBC AUTO: 92 FL (ref 82–98)
MONOCYTES # BLD AUTO: 0.86 THOUSAND/ÂΜL (ref 0.17–1.22)
MONOCYTES NFR BLD AUTO: 10 % (ref 4–12)
MV E'TISSUE VEL-LAT: 5 CM/S
MV E'TISSUE VEL-SEP: 7 CM/S
MV PEAK A VEL: 0.65 M/S
MV PEAK E VEL: 52 CM/S
MV STENOSIS PRESSURE HALF TIME: 57 MS
MV VALVE AREA P 1/2 METHOD: 3.9
NEUTROPHILS # BLD AUTO: 6.59 THOUSANDS/ÂΜL (ref 1.85–7.62)
NEUTS SEG NFR BLD AUTO: 73 % (ref 43–75)
NRBC BLD AUTO-RTO: 0 /100 WBCS
P AXIS: -8 DEGREES
P AXIS: 35 DEGREES
P AXIS: 40 DEGREES
PLATELET # BLD AUTO: 212 THOUSANDS/UL (ref 149–390)
PMV BLD AUTO: 10.2 FL (ref 8.9–12.7)
POTASSIUM SERPL-SCNC: 3.1 MMOL/L (ref 3.5–5.3)
PR INTERVAL: 150 MS
PR INTERVAL: 154 MS
PR INTERVAL: 168 MS
PROTHROMBIN TIME: 14.8 SECONDS (ref 12.3–15)
QRS AXIS: 3 DEGREES
QRS AXIS: 44 DEGREES
QRS AXIS: 59 DEGREES
QRSD INTERVAL: 88 MS
QRSD INTERVAL: 90 MS
QRSD INTERVAL: 94 MS
QT INTERVAL: 370 MS
QT INTERVAL: 386 MS
QT INTERVAL: 390 MS
QTC INTERVAL: 434 MS
QTC INTERVAL: 442 MS
QTC INTERVAL: 444 MS
RA PRESSURE ESTIMATED: 3 MMHG
RBC # BLD AUTO: 4.66 MILLION/UL (ref 3.88–5.62)
RIGHT ATRIUM AREA SYSTOLE A4C: 14.9 CM2
RIGHT VENTRICLE ID DIMENSION: 2.7 CM
SL CV LEFT ATRIUM LENGTH A2C: 5 CM
SL CV LV EF: 56
SL CV PED ECHO LEFT VENTRICLE DIASTOLIC VOLUME (MOD BIPLANE) 2D: 79 ML
SL CV PED ECHO LEFT VENTRICLE SYSTOLIC VOLUME (MOD BIPLANE) 2D: 28 ML
SODIUM SERPL-SCNC: 135 MMOL/L (ref 135–147)
T WAVE AXIS: 15 DEGREES
T WAVE AXIS: 8 DEGREES
T WAVE AXIS: 90 DEGREES
TRICUSPID ANNULAR PLANE SYSTOLIC EXCURSION: 2.1 CM
VENTRICULAR RATE: 76 BPM
VENTRICULAR RATE: 78 BPM
VENTRICULAR RATE: 86 BPM
WBC # BLD AUTO: 8.94 THOUSAND/UL (ref 4.31–10.16)

## 2024-09-06 PROCEDURE — 93971 EXTREMITY STUDY: CPT | Performed by: INTERNAL MEDICINE

## 2024-09-06 PROCEDURE — 80048 BASIC METABOLIC PNL TOTAL CA: CPT | Performed by: PHYSICIAN ASSISTANT

## 2024-09-06 PROCEDURE — 93010 ELECTROCARDIOGRAM REPORT: CPT | Performed by: INTERNAL MEDICINE

## 2024-09-06 PROCEDURE — 99232 SBSQ HOSP IP/OBS MODERATE 35: CPT | Performed by: INTERNAL MEDICINE

## 2024-09-06 PROCEDURE — 85025 COMPLETE CBC W/AUTO DIFF WBC: CPT | Performed by: PHYSICIAN ASSISTANT

## 2024-09-06 PROCEDURE — 93010 ELECTROCARDIOGRAM REPORT: CPT

## 2024-09-06 PROCEDURE — 93971 EXTREMITY STUDY: CPT

## 2024-09-06 PROCEDURE — 93306 TTE W/DOPPLER COMPLETE: CPT | Performed by: INTERNAL MEDICINE

## 2024-09-06 PROCEDURE — 85610 PROTHROMBIN TIME: CPT | Performed by: INTERNAL MEDICINE

## 2024-09-06 PROCEDURE — 93306 TTE W/DOPPLER COMPLETE: CPT

## 2024-09-06 RX ORDER — ENOXAPARIN SODIUM 300 MG/3ML
1 INJECTION INTRAVENOUS; SUBCUTANEOUS EVERY 12 HOURS
Status: DISCONTINUED | OUTPATIENT
Start: 2024-09-06 | End: 2024-09-09

## 2024-09-06 RX ADMIN — ENOXAPARIN SODIUM 100 MG: 100 INJECTION SUBCUTANEOUS at 21:20

## 2024-09-06 RX ADMIN — WARFARIN SODIUM 8 MG: 5 TABLET ORAL at 17:10

## 2024-09-06 RX ADMIN — ENOXAPARIN SODIUM 100 MG: 100 INJECTION SUBCUTANEOUS at 09:53

## 2024-09-06 RX ADMIN — CHLORTHALIDONE 25 MG: 25 TABLET ORAL at 09:53

## 2024-09-06 RX ADMIN — LOSARTAN POTASSIUM 50 MG: 50 TABLET, FILM COATED ORAL at 09:53

## 2024-09-06 NOTE — PLAN OF CARE
Problem: PAIN - ADULT  Goal: Verbalizes/displays adequate comfort level or baseline comfort level  Description: Interventions:  - Encourage patient to monitor pain and request assistance  - Assess pain using appropriate pain scale  - Administer analgesics based on type and severity of pain and evaluate response  - Implement non-pharmacological measures as appropriate and evaluate response  - Consider cultural and social influences on pain and pain management  - Notify physician/advanced practitioner if interventions unsuccessful or patient reports new pain  Outcome: Progressing     Problem: INFECTION - ADULT  Goal: Absence or prevention of progression during hospitalization  Description: INTERVENTIONS:  - Assess and monitor for signs and symptoms of infection  - Monitor lab/diagnostic results  - Monitor all insertion sites, i.e. indwelling lines, tubes, and drains  - Monitor endotracheal if appropriate and nasal secretions for changes in amount and color  - [place] appropriate cooling/warming therapies per order  - Administer medications as ordered  - Instruct and encourage patient and family to use good hand hygiene technique  - Identify and instruct in appropriate isolation precautions for identified infection/condition  Outcome: Progressing  Goal: Absence of fever/infection during neutropenic period  Description: INTERVENTIONS:  - Monitor WBC    Outcome: Progressing     Problem: SAFETY ADULT  Goal: Patient will remain free of falls  Description: INTERVENTIONS:  - Educate patient/family on patient safety including physical limitations  - Instruct patient to call for assistance with activity   - Consult OT/PT to assist with strengthening/mobility   - Keep Call bell within reach  - Keep bed low and locked with side rails adjusted as appropriate  - Keep care items and personal belongings within reach  - Initiate and maintain comfort rounds  - Make Fall Risk Sign visible to staff  - Offer Toileting every  Hours,  in advance of need  - Initiate/Maintain alarm  - Obtain necessary fall risk management equipment  - Apply yellow socks and bracelet for high fall risk patients  - Consider moving patient to room near nurses station  Outcome: Progressing  Goal: Maintain or return to baseline ADL function  Description: INTERVENTIONS:  -  Assess patient's ability to carry out ADLs; assess patient's baseline for ADL function and identify physical deficits which impact ability to perform ADLs (bathing, care of mouth/teeth, toileting, grooming, dressing, etc.)  - Assess/evaluate cause of self-care deficits   - Assess range of motion  - Assess patient's mobility; develop plan if impaired  - Assess patient's need for assistive devices and provide as appropriate  - Encourage maximum independence but intervene and supervise when necessary  - Involve family in performance of ADLs  - Assess for home care needs following discharge   - Consider OT consult to assist with ADL evaluation and planning for discharge  - Provide patient education as appropriate  Outcome: Progressing  Goal: Maintains/Returns to pre admission functional level  Description: INTERVENTIONS:  - Perform AM-PAC 6 Click Basic Mobility/ Daily Activity assessment daily.  - Set and communicate daily mobility goal to care team and patient/family/caregiver.   - Collaborate with rehabilitation services on mobility goals if consulted  - Perform Range of Motion 3 times a day.  - Reposition patient every 3 hours.  - Dangle patient 3 times a day  - Stand patient 3 times a day  - Ambulate patient 3 times a day  - Out of bed to chair 3 times a day   - Out of bed for meals 3 times a day  - Out of bed for toileting  - Record patient progress and toleration of activity level   Outcome: Progressing     Problem: DISCHARGE PLANNING  Goal: Discharge to home or other facility with appropriate resources  Description: INTERVENTIONS:  - Identify barriers to discharge w/patient and caregiver  - Arrange  for needed discharge resources and transportation as appropriate  - Identify discharge learning needs (meds, wound care, etc.)  - Arrange for interpretive services to assist at discharge as needed  - Refer to Case Management Department for coordinating discharge planning if the patient needs post-hospital services based on physician/advanced practitioner order or complex needs related to functional status, cognitive ability, or social support system  Outcome: Progressing     Problem: Knowledge Deficit  Goal: Patient/family/caregiver demonstrates understanding of disease process, treatment plan, medications, and discharge instructions  Description: Complete learning assessment and assess knowledge base.  Interventions:  - Provide teaching at level of understanding  - Provide teaching via preferred learning methods  Outcome: Progressing

## 2024-09-06 NOTE — PLAN OF CARE
Problem: PAIN - ADULT  Goal: Verbalizes/displays adequate comfort level or baseline comfort level  Description: Interventions:  - Encourage patient to monitor pain and request assistance  - Assess pain using appropriate pain scale  - Administer analgesics based on type and severity of pain and evaluate response  - Implement non-pharmacological measures as appropriate and evaluate response  - Consider cultural and social influences on pain and pain management  - Notify physician/advanced practitioner if interventions unsuccessful or patient reports new pain  Outcome: Progressing     Problem: INFECTION - ADULT  Goal: Absence or prevention of progression during hospitalization  Description: INTERVENTIONS:  - Assess and monitor for signs and symptoms of infection  - Monitor lab/diagnostic results  - Monitor all insertion sites, i.e. indwelling lines, tubes, and drains  - Monitor endotracheal if appropriate and nasal secretions for changes in amount and color  - [place] appropriate cooling/warming therapies per order  - Administer medications as ordered  - Instruct and encourage patient and family to use good hand hygiene technique  - Identify and instruct in appropriate isolation precautions for identified infection/condition  Outcome: Progressing  Goal: Absence of fever/infection during neutropenic period  Description: INTERVENTIONS:  - Monitor WBC    Outcome: Progressing

## 2024-09-06 NOTE — ASSESSMENT & PLAN NOTE
"Resume home chlorthalidone and losartan  Monitor blood pressure  /84   Pulse 81   Temp 98.8 °F (37.1 °C)   Resp 18   Ht 5' 7\" (1.702 m)   Wt 96.4 kg (212 lb 9.6 oz)   SpO2 94%   BMI 33.30 kg/m²   Vitals as per routine   "

## 2024-09-06 NOTE — QUICK NOTE
"Called to bedside as patient reported numbness in digits 3-4 in his right arm upon awakening early this morning.  Patient reports he was sleeping on his left side and had his right arm bent at the elbow on top of him.  Upon my evaluation symptoms had fully resolved.  Patient also reported feeling some \"brain fogginess \" upon awakening, which has also resolved.  On examination, there is no focal neurological deficits.  Motor is 5/5 strength in bilateral upper and lower extremities.  Sensation is equal intact in bilateral upper and lower extremities.  Normal finger-nose and heel-to-shin bilaterally.  EOMI.  No peripheral visual field deficits.  No aphasia or dysarthria.  Fully oriented x 4.      Suspect patient experienced ulnar nerve entrapment while sleeping with his right elbow bent.  Will hold on imaging at this time.  If patient has recurrent or worse symptoms, will obtain CTh.  "

## 2024-09-06 NOTE — ASSESSMENT & PLAN NOTE
Patient presents with right-sided rib pain.  Noted the last day or 2 he has had left-sided shoulder pain.  CTA chest reveals Segmental and subsegmental emboli in the right middle lobe and both lower lobes with mild clot burden.   No evidence of strain on imaging  He has IVC filter in place for the last 20 years.  Was previously on Coumadin 20 years ago.  Venous duplex upper extremity- negative for DVT   Echo pending   Tele   Copay for eliquis and xarelto- too high, will bridge lovenox- coumdin  Monitor INR     Outpatient hematology consult

## 2024-09-06 NOTE — PROGRESS NOTES
"[facility]  Progress Note  Name: Danilo Ta I  MRN: [id number]  Unit/Bed#: [facility]-01 I Date of Admission: 9/5/2024   Date of Service: 9/6/2024 I Hospital Day: 0    Assessment & Plan   * Acute pulmonary embolism without acute cor pulmonale (HCC)  Assessment & Plan  Patient presents with right-sided rib pain.  Noted the last day or 2 he has had left-sided shoulder pain.  CTA chest reveals Segmental and subsegmental emboli in the right middle lobe and both lower lobes with mild clot burden.   No evidence of strain on imaging  He has IVC filter in place for the last 20 years.  Was previously on Coumadin 20 years ago.  Venous duplex upper extremity- negative for DVT   Echo pending   Tele   Copay for eliquis and xarelto- too high, will bridge lovenox- coumdin  Monitor INR     Outpatient hematology consult    Hypertension  Assessment & Plan  Resume home chlorthalidone and losartan  Monitor blood pressure  /84   Pulse 81   Temp 98.8 °F (37.1 °C)   Resp 18   Ht 5' 7\" (1.702 m)   Wt 96.4 kg (212 lb 9.6 oz)   SpO2 94%   BMI 33.30 kg/m²   Vitals as per routine     GERD (gastroesophageal reflux disease)  Assessment & Plan  Continue PPI               VTE Pharmacologic Prophylaxis: VTE Score: 6 Moderate Risk (Score 3-4) - Pharmacological DVT Prophylaxis Ordered: enoxaparin (Lovenox).    Mobility:   Basic Mobility Inpatient Raw Score: 24  JH-HLM Goal: 8: Walk 250 feet or more  JH-HLM Achieved: 6: Walk 10 steps or more  JH-HLM Goal achieved. Continue to encourage appropriate mobility.    Patient Centered Rounds: I performed bedside rounds with nursing staff today.   Discussions with Specialists or Other Care Team Provider: YES    Education and Discussions with Family / Patient:  YES.     Total Time Spent on Date of Encounter in care of patient: 39 mins. This time was spent on one or more of the following: performing physical exam; counseling and coordination of care; obtaining or " reviewing history; documenting in the medical record; reviewing/ordering tests, medications or procedures; communicating with other healthcare professionals and discussing with patient's family/caregivers.    Current Length of Stay: 0 day(s)  Current Patient Status: Observation   Certification Statement: The patient will continue to require additional inpatient hospital stay due to PENDING STABILIZATION  Discharge Plan: Anticipate discharge in 24-48 hrs to home.    Code Status: Level 1 - Full Code    Subjective:   Seen and examined at bedside  Awake and alert   C/o left shoulder pain      Objective:     Vitals:   Temp (24hrs), Av.1 °F (37.3 °C), Min:98.8 °F (37.1 °C), Max:99.5 °F (37.5 °C)    Temp:  [98.8 °F (37.1 °C)-99.5 °F (37.5 °C)] 98.8 °F (37.1 °C)  HR:  [81-96] 81  Resp:  [18] 18  BP: (123-139)/(61-89) 139/84  SpO2:  [94 %-96 %] 94 %  Body mass index is 33.3 kg/m².     Input and Output Summary (last 24 hours):     Intake/Output Summary (Last 24 hours) at 2024 1346  Last data filed at 2024 1344  Gross per 24 hour   Intake 1260 ml   Output --   Net 1260 ml       Physical Exam:   Physical Exam  Vitals reviewed.   Constitutional:       Appearance: Normal appearance.   HENT:      Head: Atraumatic.      Mouth/Throat:      Mouth: Mucous membranes are dry.   Cardiovascular:      Rate and Rhythm: Normal rate.      Heart sounds: Normal heart sounds.   Pulmonary:      Effort: No respiratory distress.   Abdominal:      General: Bowel sounds are normal.   Musculoskeletal:      Right lower leg: No edema.      Left lower leg: No edema.   Skin:     General: Skin is dry.      Capillary Refill: Capillary refill takes less than 2 seconds.   Neurological:      Mental Status: He is alert. Mental status is at baseline.          Additional Data:     Labs:  Results from last 7 days   Lab Units 24  0430   WBC Thousand/uL 8.94   HEMOGLOBIN g/dL 14.8   HEMATOCRIT % 42.8   PLATELETS Thousands/uL 212   SEGS PCT % 73    LYMPHO PCT % 15   MONO PCT % 10   EOS PCT % 1     Results from last 7 days   Lab Units 09/06/24  0430 09/05/24  1138   SODIUM mmol/L 135 136   POTASSIUM mmol/L 3.1* 3.5   CHLORIDE mmol/L 100 101   CO2 mmol/L 26 28   BUN mg/dL 20 17   CREATININE mg/dL 1.12 0.95   ANION GAP mmol/L 9 7   CALCIUM mg/dL 9.4 9.5   ALBUMIN g/dL  --  3.9   TOTAL BILIRUBIN mg/dL  --  1.36*   ALK PHOS U/L  --  72   ALT U/L  --  16   AST U/L  --  14   GLUCOSE RANDOM mg/dL 170* 135     Results from last 7 days   Lab Units 09/06/24  0944   INR  1.11                   Lines/Drains:  Invasive Devices       Peripheral Intravenous Line  Duration             Peripheral IV 09/05/24 Distal;Right;Upper;Ventral (anterior) Arm 1 day                      Telemetry:  Telemetry Orders (From admission, onward)               24 Hour Telemetry Monitoring  Continuous x 24 Hours (Telem)        Question:  Reason for 24 Hour Telemetry  Answer:  Pulmonary Embolism                     Telemetry Reviewed: Normal Sinus Rhythm  Indication for Continued Telemetry Use: No indication for continued use. Will discontinue.              Imaging: Reviewed radiology reports from this admission including: chest CT scan    Recent Cultures (last 7 days):         Last 24 Hours Medication List:   Current Facility-Administered Medications   Medication Dose Route Frequency Provider Last Rate    acetaminophen  650 mg Oral Q6H PRN Leonardo Lomas PA-C      calcium carbonate  500 mg Oral BID PRN Leonardo Lomas PA-C      chlorthalidone  25 mg Oral Daily Leonardo Lomas PA-C      enoxaparin  1 mg/kg Subcutaneous Q12H [facility] Leonardo Lomas PA-C      HYDROmorphone  0.5 mg Intravenous Q4H PRN Leonardo Lomas PA-C      losartan  50 mg Oral Daily Leonardo Lomas PA-C      melatonin  6 mg Oral HS Leonardo Lomas PA-C      ondansetron  4 mg Intravenous Q6H PRN Leonardo Lomas PA-C      oxyCODONE  10 mg Oral Q4H PRN Leonardo Lomas PA-C      oxyCODONE  5 mg Oral Q4H PRN Leonardo Lomas,  PA-DAVE      warfarin  8 mg Oral Once (warfarin) Skylar Mayer MD          Today, Patient Was Seen By: Skylar Mayer MD    **Please Note: This note may have been constructed using a voice recognition system.**

## 2024-09-06 NOTE — UTILIZATION REVIEW
Initial Clinical Review    Admission: Date/Time/Statement:   Admission Orders (From admission, onward)       Ordered        09/05/24 1951  Place in Observation  Once                          Orders Placed This Encounter   Procedures    Place in Observation     Standing Status:   Standing     Number of Occurrences:   1     Order Specific Question:   Level of Care     Answer:   Med Surg [16]     ED Arrival Information       Expected   9/5/2024 11:14    Arrival   9/5/2024 11:26    Acuity   Urgent              Means of arrival   Walk-In    Escorted by   Self    Service   Hospitalist    Admission type   Emergency              Arrival complaint   rib pain left side             Chief Complaint   Patient presents with    Rib Pain     Pt said he has left sided rib pain since Tuesday, pt states its hurt more when he moves or takes a deep breath, pt denies sob/n/v. Pt has increase fatigue.        Initial Presentation: 69 y.o. male to ED via walk-in from home  Present to ED with chief complaint of rib pain.  Tells me that every time he takes deep breath he has right-sided rib pain.  Pain is pleuritic in nature.  He also has had left shoulder pain last few days but is unsure if this is related. Has IVC filter in place and was previously on Coumadin approximately 20 years ago. He is not maintained on any anticoagulants   PMHX: DVT 20 years ago with IVC filter in place, hypertension   Admitted to OBS with DX: Acute pulmonary embolism without acute cor pulmonale   CTA chest reveals Segmental and subsegmental emboli in the right middle lobe and both lower lobes with mild clot burden.   PLAN: tele monitoring; monitor labs; f/u UE duplex U/S; f/u echo; start lovenox ; consult CM      Anticipated Length of Stay/Certification Statement: Patient will be admitted on an observation basis with an anticipated length of stay of less than 2 midnights secondary to pulmonary emboli, needing workup and Lovenox injection prior to starting oral  anticoagulation.         ED Triage Vitals [09/05/24 1133]   Temperature Pulse Respirations Blood Pressure SpO2 Pain Score   (!) 97.4 °F (36.3 °C) (!) 54 18 133/86 97 % 4     Weight (last 2 days)       Date/Time Weight    09/05/24 2021 96.4 (212.6)            Vital Signs (last 3 days)       Date/Time Temp Pulse Resp BP MAP (mmHg) SpO2 O2 Device Patient Position - Orthostatic VS Pain    09/06/24 07:34:26 98.8 °F (37.1 °C) 81 18 139/84 102 94 % -- -- --    09/06/24 0729 -- -- -- -- -- -- None (Room air) -- 3    09/06/24 01:40:47 99.5 °F (37.5 °C) 91 18 123/79 94 96 % -- -- --    09/05/24 2140 -- -- -- -- -- -- None (Room air) -- 5    09/05/24 20:27:24 99.1 °F (37.3 °C) 96 18 131/89 103 96 % None (Room air) Lying --    09/05/24 2027 -- -- -- -- -- -- -- -- 4    09/05/24 1915 -- 83 18 134/84 105 96 % None (Room air) Sitting --    09/05/24 1744 -- 82 18 139/61 -- 96 % None (Room air) Sitting --    09/05/24 1133 97.4 °F (36.3 °C) 54 18 133/86 -- 97 % -- -- 4              Pertinent Labs/Diagnostic Test Results:   Radiology:  CTA ED chest PE study   Final Interpretation by Tamiko Early MD (09/05 7717)      Segmental and subsegmental emboli in the right middle lobe and both lower lobes with mild clot burden.      RV/LV diameter ratio less than 1 with nothing to indicate right heart strain.      Small left pleural effusion.         I notified Dr. RITIKA KAY on 9/5/2024 6:36 PM by epic secure chat and he responded immediately.                  Workstation performed: [id number]         CT chest abdomen pelvis w contrast   Final Interpretation by Leonardo Roberts MD (09/05 1656)   Addendum (preliminary) 1 of 1 by Leonardo Roberts MD (09/05 1656)   ADDENDUM:      Possible filling defect in a proximal segmental branch of the pulmonary    artery in the right middle lobe (series 2, image 67). Hypoattenuation    within a segmental branch of the pulmonary artery in the right lower lobe    (series 2, image 78). However,     technique is suboptimal for assessment for pulmonary emboli. These    findings were discussed with Dr. Curran at 16:50.      Final   1.  New trace left pleural effusion.   2.  No acute abdominopelvic pathology.   3.  Multiple bilateral renal calculi measuring up to 10 mm. No hydroureteronephrosis.                  Workstation performed: [id number]         XR chest 2 views   Final Interpretation by Robles Alatorre MD (09/05 1358)   Small left basilar pleural effusion with lungs otherwise clear.            Workstation performed: [id number]         VAS upper limb venous duplex scan, unilateral/limited    (Results Pending)       Results from last 7 days   Lab Units 09/06/24  0430 09/05/24  1138   WBC Thousand/uL 8.94 9.62   HEMOGLOBIN g/dL 14.8 15.1   HEMATOCRIT % 42.8 42.7   PLATELETS Thousands/uL 212 203   TOTAL NEUT ABS Thousands/µL 6.59 7.10        Results from last 7 days   Lab Units 09/06/24  0430 09/05/24  1138   SODIUM mmol/L 135 136   POTASSIUM mmol/L 3.1* 3.5   CHLORIDE mmol/L 100 101   CO2 mmol/L 26 28   ANION GAP mmol/L 9 7   BUN mg/dL 20 17   CREATININE mg/dL 1.12 0.95   EGFR ml/min/1.73sq m 66 81   CALCIUM mg/dL 9.4 9.5     Results from last 7 days   Lab Units 09/05/24  1138   AST U/L 14   ALT U/L 16   ALK PHOS U/L 72   TOTAL PROTEIN g/dL 7.3   ALBUMIN g/dL 3.9   TOTAL BILIRUBIN mg/dL 1.36*        Results from last 7 days   Lab Units 09/06/24  0430 09/05/24  1138   GLUCOSE RANDOM mg/dL 170* 135      Results from last 7 days   Lab Units 09/05/24  1311 09/05/24  1138   HS TNI 0HR ng/L  --  4   HS TNI 2HR ng/L 4  --    HSTNI D2 ng/L 0  --      Results from last 7 days   Lab Units 09/05/24  1138   D-DIMER QUANTITATIVE ug/ml FEU 1.74*     Results from last 7 days   Lab Units 09/06/24  0944   PROTIME seconds 14.8   INR  1.11        Results from last 7 days   Lab Units 09/05/24  1310   CLARITY UA  Clear   COLOR UA  Yellow   SPEC GRAV UA  1.020   PH UA  7.0   GLUCOSE UA mg/dl Negative   KETONES UA mg/dl Negative    BLOOD UA  Negative   PROTEIN UA mg/dl Negative   NITRITE UA  Negative   BILIRUBIN UA  Negative   UROBILINOGEN UA (BE) mg/dl <2.0   LEUKOCYTES UA  Negative          ED Treatment-Medication Administration from 09/05/2024 1113 to 09/05/2024 2020         Date/Time Order Dose Route Action     09/05/2024 1337 iohexol (OMNIPAQUE) 350 MG/ML injection (MULTI-DOSE) 100 mL 100 mL Intravenous Given     09/05/2024 1720 iohexol (OMNIPAQUE) 350 MG/ML injection (MULTI-DOSE) 100 mL 85 mL Intravenous Given            Past Medical History:   Diagnosis Date    GERD (gastroesophageal reflux disease)     Hyperlipidemia     Hypertension     Kidney stone     Meniere disease     Pulmonary embolism (HCC)     Thoracic aortic aneurysm (HCC)      Present on Admission:   Hypertension   GERD (gastroesophageal reflux disease)      Admitting Diagnosis: Rib pain [R07.81]  Bilateral pulmonary embolism (HCC) [I26.99]    Age/Sex: 69 y.o. male    Admission Orders: SCDs; I/O; tele monitoring; regular diet    Scheduled Medications:  chlorthalidone, 25 mg, Oral, Daily  enoxaparin, 1 mg/kg, Subcutaneous, Q12H GRACE  losartan, 50 mg, Oral, Daily  melatonin, 6 mg, Oral, HS      Continuous IV Infusions: None       PRN Meds:  acetaminophen, 650 mg, Oral, Q6H PRN  calcium carbonate, 500 mg, Oral, BID PRN  HYDROmorphone, 0.5 mg, Intravenous, Q4H PRN  ondansetron, 4 mg, Intravenous, Q6H PRN  oxyCODONE, 10 mg, Oral, Q4H PRN  oxyCODONE, 5 mg, Oral, Q4H PRN        Network Utilization Review Department  ATTENTION: Please call with any questions or concerns to [phone] and carefully listen to the prompts so that you are directed to the right person. All voicemails are confidential.   For Discharge needs, contact Care Management DC Support Team at [phone] opt. [phone]  Send all requests for admission clinical reviews, approved or denied determinations and any other requests to dedicated fax number below belonging to the campus where the patient is receiving  treatment. List of dedicated fax numbers for the Facilities:  FACILITY NAME UR FAX NUMBER   ADMISSION DENIALS (Administrative/Medical Necessity) [phone]   DISCHARGE SUPPORT TEAM (NETWORK) [phone]   PARENT CHILD HEALTH (Maternity/NICU/Pediatrics) [phone]   [facility] [phone]   [facility] [phone]   [facility] [phone]   [facility] [phone]   [facility] [phone]   [facility] [phone]   [facility] [phone]   [facility] [phone]   [facility] [phone]   [facility] [phone]   [facility] [phone]   [facility] [phone]

## 2024-09-06 NOTE — CASE MANAGEMENT
Case Management Assessment & Discharge Planning Note    Patient name Danilo Padilla Jr.  Location /-01 MRN [id number]  : 1955 Date 2024       Current Admission Date: 2024  Current Admission Diagnosis:Acute pulmonary embolism without acute cor pulmonale (HCC)   Patient Active Problem List    Diagnosis Date Noted Date Diagnosed    Acute pulmonary embolism without acute cor pulmonale (HCC) 2024     Kidney stones 2024     Contusion of rib on right side 02/10/2024     Viral infection 2023     GERD (gastroesophageal reflux disease)      Hyperlipidemia      Hypertension      Nontraumatic complete tear of left rotator cuff 12/15/2021     Shoulder impingement syndrome, right 2020     Bursitis of left knee 2019     Pes anserinus bursitis of left knee 2019       LOS (days): 0  Geometric Mean LOS (GMLOS) (days):   Days to GMLOS:     OBJECTIVE:              Current admission status: Observation       Preferred Pharmacy:   [id number] [place] - [address]  [address] [address]  Phone: [phone] Fax: [phone]    Primary Care Provider: Fredis Geiger MD    Primary Insurance: [employer]  Secondary Insurance:     ASSESSMENT:  Active Health Care Proxies       Williammalou Clay Health Care Representative - [facility] Phone: [phone] (Home)                 Advance Directives  Does patient have a Health Care POA?: Yes  Was patient offered paperwork?: Yes  Does patient currently have a Health Care decision maker?: Yes, please see Health Care Proxy section  Does patient have Advance Directives?: Yes  Advance Directives: Power of  for health care  Primary Contact: Brother Mejia.    Obs Notice Signed: 24    Readmission Root Cause  30 Day Readmission: No    Patient Information  Admitted from:: Home  Mental Status: Alert  During Assessment patient was accompanied by: Not  accompanied during assessment  Assessment information provided by:: Patient  Primary Caregiver: Self  Support Systems: Family members (Brother)  County of Residence: [place]  What city do you live in?: [place]  Home entry access options. Select all that apply.: No steps to enter home  Type of Current Residence: 2 story home  Upon entering residence, is there a bedroom on the main floor (no further steps)?: No  A bedroom is located on the following floor levels of residence (select all that apply):: 2nd Floor  Upon entering residence, is there a bathroom on the main floor (no further steps)?: Yes  Number of steps to 2nd floor from main floor: One Flight  Living Arrangements: Lives Alone  Is patient a ?: No    Activities of Daily Living Prior to Admission  Functional Status: Independent  Completes ADLs independently?: Yes  Ambulates independently?: Yes  Does patient use assisted devices?: No  Does patient currently own DME?: No  Does patient have a history of Outpatient Therapy (PT/OT)?: Yes  Does the patient have a history of Short-Term Rehab?: No  Does patient have a history of HHC?: No  Does patient currently have HHC?: No         Patient Information Continued  Income Source: Employed  Does patient have prescription coverage?: Yes  Does patient receive dialysis treatments?: No  Does patient have a history of substance abuse?: No  Does patient have a history of Mental Health Diagnosis?: No         Means of Transportation  Means of Transport to Appts:: Drives Self      Social Determinants of Health (SDOH)      Flowsheet Row Most Recent Value   Housing Stability    In the last 12 months, was there a time when you were not able to pay the mortgage or rent on time? N   In the past 12 months, how many times have you moved where you were living? 0   At any time in the past 12 months, were you homeless or living in a shelter (including now)? N   Transportation Needs    In the past 12 months, has lack of  transportation kept you from medical appointments or from getting medications? no   In the past 12 months, has lack of transportation kept you from meetings, work, or from getting things needed for daily living? No   Food Insecurity    Within the past 12 months, you worried that your food would run out before you got the money to buy more. Never true   Within the past 12 months, the food you bought just didn't last and you didn't have money to get more. Never true   Utilities    In the past 12 months has the electric, gas, oil, or water company threatened to shut off services in your home? No            DISCHARGE DETAILS:    Discharge planning discussed with:: Pt  Freedom of Choice: Yes     CM contacted family/caregiver?: No- see comments (Pt is in contact with family.)  Were Treatment Team discharge recommendations reviewed with patient/caregiver?: Yes  Did patient/caregiver verbalize understanding of patient care needs?: Yes  Were patient/caregiver advised of the risks associated with not following Treatment Team discharge recommendations?: Yes         Requested Home Health Care         Is the patient interested in HHC at discharge?: No    DME Referral Provided  Referral made for DME?: No    Other Referral/Resources/Interventions Provided:  Interventions: None Indicated         Treatment Team Recommendation: Home  Discharge Destination Plan:: Home            CM conducted price check for script of Lovenox. Copay is $0.00. CM informed AP Dr. Mayer.                              Additional Comments: Cm met with pt at bedside to discern discharge needs. Pt lives alone in a 2sh, 0STE, bedroom in the upstairs. Pt reports being independent with walking and ADLs PTA. Uses and Owns no DME. Employed and drives. Hx of OPPT for shoulder. No hx of STR or HHC. No SDOH, mental health, or D&A concerns. Pt reports that brother Clay is medical POA. Pt drove himself to the hospital and intends on driving home himself.

## 2024-09-07 LAB
ANION GAP SERPL CALCULATED.3IONS-SCNC: 8 MMOL/L (ref 4–13)
BASOPHILS # BLD AUTO: 0.03 THOUSANDS/ÂΜL (ref 0–0.1)
BASOPHILS NFR BLD AUTO: 0 % (ref 0–1)
BUN SERPL-MCNC: 24 MG/DL (ref 5–25)
CALCIUM SERPL-MCNC: 8.8 MG/DL (ref 8.4–10.2)
CHLORIDE SERPL-SCNC: 98 MMOL/L (ref 96–108)
CO2 SERPL-SCNC: 30 MMOL/L (ref 21–32)
CREAT SERPL-MCNC: 1.2 MG/DL (ref 0.6–1.3)
EOSINOPHIL # BLD AUTO: 0.1 THOUSAND/ÂΜL (ref 0–0.61)
EOSINOPHIL NFR BLD AUTO: 1 % (ref 0–6)
ERYTHROCYTE [DISTWIDTH] IN BLOOD BY AUTOMATED COUNT: 13.2 % (ref 11.6–15.1)
GFR SERPL CREATININE-BSD FRML MDRD: 61 ML/MIN/1.73SQ M
GLUCOSE SERPL-MCNC: 122 MG/DL (ref 65–140)
HCT VFR BLD AUTO: 40.7 % (ref 36.5–49.3)
HGB BLD-MCNC: 13.9 G/DL (ref 12–17)
IMM GRANULOCYTES # BLD AUTO: 0.06 THOUSAND/UL (ref 0–0.2)
IMM GRANULOCYTES NFR BLD AUTO: 1 % (ref 0–2)
INR PPP: 1.11 (ref 0.85–1.19)
LYMPHOCYTES # BLD AUTO: 1.69 THOUSANDS/ÂΜL (ref 0.6–4.47)
LYMPHOCYTES NFR BLD AUTO: 20 % (ref 14–44)
MCH RBC QN AUTO: 32 PG (ref 26.8–34.3)
MCHC RBC AUTO-ENTMCNC: 34.2 G/DL (ref 31.4–37.4)
MCV RBC AUTO: 94 FL (ref 82–98)
MONOCYTES # BLD AUTO: 0.9 THOUSAND/ÂΜL (ref 0.17–1.22)
MONOCYTES NFR BLD AUTO: 11 % (ref 4–12)
NEUTROPHILS # BLD AUTO: 5.78 THOUSANDS/ÂΜL (ref 1.85–7.62)
NEUTS SEG NFR BLD AUTO: 67 % (ref 43–75)
NRBC BLD AUTO-RTO: 0 /100 WBCS
PLATELET # BLD AUTO: 205 THOUSANDS/UL (ref 149–390)
PMV BLD AUTO: 10.4 FL (ref 8.9–12.7)
POTASSIUM SERPL-SCNC: 3.1 MMOL/L (ref 3.5–5.3)
PROTHROMBIN TIME: 14.8 SECONDS (ref 12.3–15)
RBC # BLD AUTO: 4.34 MILLION/UL (ref 3.88–5.62)
SODIUM SERPL-SCNC: 136 MMOL/L (ref 135–147)
WBC # BLD AUTO: 8.56 THOUSAND/UL (ref 4.31–10.16)

## 2024-09-07 PROCEDURE — 85610 PROTHROMBIN TIME: CPT | Performed by: INTERNAL MEDICINE

## 2024-09-07 PROCEDURE — 85025 COMPLETE CBC W/AUTO DIFF WBC: CPT | Performed by: INTERNAL MEDICINE

## 2024-09-07 PROCEDURE — 80048 BASIC METABOLIC PNL TOTAL CA: CPT | Performed by: INTERNAL MEDICINE

## 2024-09-07 PROCEDURE — 99232 SBSQ HOSP IP/OBS MODERATE 35: CPT | Performed by: INTERNAL MEDICINE

## 2024-09-07 RX ORDER — POTASSIUM CHLORIDE 1500 MG/1
40 TABLET, EXTENDED RELEASE ORAL ONCE
Status: COMPLETED | OUTPATIENT
Start: 2024-09-07 | End: 2024-09-07

## 2024-09-07 RX ORDER — WARFARIN SODIUM 10 MG/1
10 TABLET ORAL
Status: COMPLETED | OUTPATIENT
Start: 2024-09-07 | End: 2024-09-07

## 2024-09-07 RX ADMIN — CHLORTHALIDONE 25 MG: 25 TABLET ORAL at 08:46

## 2024-09-07 RX ADMIN — ENOXAPARIN SODIUM 100 MG: 100 INJECTION SUBCUTANEOUS at 20:49

## 2024-09-07 RX ADMIN — LOSARTAN POTASSIUM 50 MG: 50 TABLET, FILM COATED ORAL at 08:46

## 2024-09-07 RX ADMIN — POTASSIUM CHLORIDE 40 MEQ: 1500 TABLET, EXTENDED RELEASE ORAL at 08:46

## 2024-09-07 RX ADMIN — WARFARIN SODIUM 10 MG: 10 TABLET ORAL at 17:43

## 2024-09-07 RX ADMIN — ENOXAPARIN SODIUM 100 MG: 100 INJECTION SUBCUTANEOUS at 08:47

## 2024-09-07 NOTE — PLAN OF CARE
Problem: PAIN - ADULT  Goal: Verbalizes/displays adequate comfort level or baseline comfort level  Description: Interventions:  - Encourage patient to monitor pain and request assistance  - Assess pain using appropriate pain scale  - Administer analgesics based on type and severity of pain and evaluate response  - Implement non-pharmacological measures as appropriate and evaluate response  - Consider cultural and social influences on pain and pain management  - Notify physician/advanced practitioner if interventions unsuccessful or patient reports new pain  Outcome: Progressing     Problem: INFECTION - ADULT  Goal: Absence or prevention of progression during hospitalization  Description: INTERVENTIONS:  - Assess and monitor for signs and symptoms of infection  - Monitor lab/diagnostic results  - Monitor all insertion sites, i.e. indwelling lines, tubes, and drains  - Monitor endotracheal if appropriate and nasal secretions for changes in amount and color  - [place] appropriate cooling/warming therapies per order  - Administer medications as ordered  - Instruct and encourage patient and family to use good hand hygiene technique  - Identify and instruct in appropriate isolation precautions for identified infection/condition  Outcome: Progressing     Problem: SAFETY ADULT  Goal: Patient will remain free of falls  Description: INTERVENTIONS:  - Educate patient/family on patient safety including physical limitations  - Instruct patient to call for assistance with activity   - Consult OT/PT to assist with strengthening/mobility   - Keep Call bell within reach  - Keep bed low and locked with side rails adjusted as appropriate  - Keep care items and personal belongings within reach  - Initiate and maintain comfort rounds  - Make Fall Risk Sign visible to staff  - Offer Toileting every 2 Hours, in advance of need  - Apply yellow socks and bracelet for high fall risk patients  - Consider moving patient to room near nurses  station  Outcome: Progressing  Goal: Maintain or return to baseline ADL function  Description: INTERVENTIONS:  -  Assess patient's ability to carry out ADLs; assess patient's baseline for ADL function and identify physical deficits which impact ability to perform ADLs (bathing, care of mouth/teeth, toileting, grooming, dressing, etc.)  - Assess/evaluate cause of self-care deficits   - Assess range of motion  - Assess patient's mobility; develop plan if impaired  - Assess patient's need for assistive devices and provide as appropriate  - Encourage maximum independence but intervene and supervise when necessary  - Involve family in performance of ADLs  - Assess for home care needs following discharge   - Consider OT consult to assist with ADL evaluation and planning for discharge  - Provide patient education as appropriate  Outcome: Progressing  Goal: Maintains/Returns to pre admission functional level  Description: INTERVENTIONS:  - Perform AM-PAC 6 Click Basic Mobility/ Daily Activity assessment daily.  - Set and communicate daily mobility goal to care team and patient/family/caregiver.   - Collaborate with rehabilitation services on mobility goals if consulted  - Perform Range of Motion 3 times a day.  - Reposition patient every 2 hours.  - Dangle patient 3 times a day  - Stand patient 3 times a day  - Ambulate patient 3 times a day  - Out of bed to chair 3 times a day   - Out of bed for meals 3 times a day  - Out of bed for toileting  - Record patient progress and toleration of activity level   Outcome: Progressing     Problem: DISCHARGE PLANNING  Goal: Discharge to home or other facility with appropriate resources  Description: INTERVENTIONS:  - Identify barriers to discharge w/patient and caregiver  - Arrange for needed discharge resources and transportation as appropriate  - Identify discharge learning needs (meds, wound care, etc.)  - Arrange for interpretive services to assist at discharge as needed  - Refer  to Case Management Department for coordinating discharge planning if the patient needs post-hospital services based on physician/advanced practitioner order or complex needs related to functional status, cognitive ability, or social support system  Outcome: Progressing     Problem: Knowledge Deficit  Goal: Patient/family/caregiver demonstrates understanding of disease process, treatment plan, medications, and discharge instructions  Description: Complete learning assessment and assess knowledge base.  Interventions:  - Provide teaching at level of understanding  - Provide teaching via preferred learning methods  Outcome: Progressing

## 2024-09-07 NOTE — PROGRESS NOTES
"[facility]  Progress Note  Name: Danilo Ta I  MRN: [id number]  Unit/Bed#: -01 I Date of Admission: 9/5/2024   Date of Service: 9/7/2024 I Hospital Day: 0    Assessment & Plan   * Acute pulmonary embolism without acute cor pulmonale (HCC)  Assessment & Plan  Patient presents with right-sided rib pain.  Noted the last day or 2 he has had left-sided shoulder pain.  CTA chest reveals Segmental and subsegmental emboli in the right middle lobe and both lower lobes with mild clot burden.   No evidence of strain on imaging  He has IVC filter in place for the last 20 years.  Was previously on Coumadin 20 years ago.  Venous duplex upper extremity- negative for DVT   Echo -EF 56%, grade 1 diastolic dysfunction  Recent Labs     09/06/24  0944 09/07/24  0811   INR 1.11 1.11      Copay for eliquis and xarelto- too high, will bridge lovenox-Coumadin  Co-pay for Lovenox is $0 however patient not comfortable to give himself Lovenox at home  Monitor INR daily, goal 2-3    Outpatient hematology consult    Hypertension  Assessment & Plan  Resume home chlorthalidone and losartan  Monitor blood pressure  /56   Pulse 71   Temp 98.9 °F (37.2 °C)   Resp 19   Ht 5' 7\" (1.702 m)   Wt 96.2 kg (212 lb 1.3 oz)   SpO2 93%   BMI 33.22 kg/m²   Vitals as per routine     GERD (gastroesophageal reflux disease)  Assessment & Plan  Continue PPI               VTE Pharmacologic Prophylaxis: VTE Score: 6 Moderate Risk (Score 3-4) - Pharmacological DVT Prophylaxis Ordered: enoxaparin (Lovenox).    Mobility:   Basic Mobility Inpatient Raw Score: 24  JH-HLM Goal: 8: Walk 250 feet or more  JH-HLM Achieved: 7: Walk 25 feet or more  JH-HLM Goal achieved. Continue to encourage appropriate mobility.    Patient Centered Rounds: I performed bedside rounds with nursing staff today.   Discussions with Specialists or Other Care Team Provider: YES    Education and Discussions with Family / Patient: Declined to call " person    Total Time Spent on Date of Encounter in care of patient: 39 mins. This time was spent on one or more of the following: performing physical exam; counseling and coordination of care; obtaining or reviewing history; documenting in the medical record; reviewing/ordering tests, medications or procedures; communicating with other healthcare professionals and discussing with patient's family/caregivers.    Current Length of Stay: 0 day(s)  Current Patient Status: Observation   Certification Statement: The patient will continue to require additional inpatient hospital stay due to PENDING STABILIZATION  Discharge Plan: Anticipate discharge in 24-48 hrs to home.    Code Status: Level 1 - Full Code    Subjective:   Seen and examined at bedside  Awake and alert   Stable  No new complaints      Objective:     Vitals:   Temp (24hrs), Av.1 °F (37.3 °C), Min:98.9 °F (37.2 °C), Max:99.2 °F (37.3 °C)    Temp:  [98.9 °F (37.2 °C)-99.2 °F (37.3 °C)] 98.9 °F (37.2 °C)  HR:  [71-92] 71  Resp:  [18-19] 19  BP: (117-139)/(56-84) 127/56  SpO2:  [92 %-94 %] 93 %  Body mass index is 33.22 kg/m².     Input and Output Summary (last 24 hours):     Intake/Output Summary (Last 24 hours) at 2024 1052  Last data filed at 2024 0900  Gross per 24 hour   Intake 500 ml   Output 400 ml   Net 100 ml       Physical Exam:   Physical Exam  Vitals reviewed.   Constitutional:       Appearance: Normal appearance.   HENT:      Head: Atraumatic.      Mouth/Throat:      Mouth: Mucous membranes are dry.   Cardiovascular:      Rate and Rhythm: Normal rate.      Heart sounds: Normal heart sounds.   Pulmonary:      Effort: No respiratory distress.   Abdominal:      General: Bowel sounds are normal.   Musculoskeletal:      Right lower leg: No edema.      Left lower leg: No edema.   Skin:     General: Skin is dry.      Capillary Refill: Capillary refill takes less than 2 seconds.   Neurological:      Mental Status: He is alert. Mental status is  at baseline.          Additional Data:     Labs:  Results from last 7 days   Lab Units 09/07/24  0443   WBC Thousand/uL 8.56   HEMOGLOBIN g/dL 13.9   HEMATOCRIT % 40.7   PLATELETS Thousands/uL 205   SEGS PCT % 67   LYMPHO PCT % 20   MONO PCT % 11   EOS PCT % 1     Results from last 7 days   Lab Units 09/07/24  0443 09/06/24  0430 09/05/24  1138   SODIUM mmol/L 136   < > 136   POTASSIUM mmol/L 3.1*   < > 3.5   CHLORIDE mmol/L 98   < > 101   CO2 mmol/L 30   < > 28   BUN mg/dL 24   < > 17   CREATININE mg/dL 1.20   < > 0.95   ANION GAP mmol/L 8   < > 7   CALCIUM mg/dL 8.8   < > 9.5   ALBUMIN g/dL  --   --  3.9   TOTAL BILIRUBIN mg/dL  --   --  1.36*   ALK PHOS U/L  --   --  72   ALT U/L  --   --  16   AST U/L  --   --  14   GLUCOSE RANDOM mg/dL 122   < > 135    < > = values in this interval not displayed.     Results from last 7 days   Lab Units 09/07/24  0811   INR  1.11                   Lines/Drains:  Invasive Devices       Peripheral Intravenous Line  Duration             Peripheral IV 09/05/24 Distal;Right;Upper;Ventral (anterior) Arm 1 day                      Telemetry:  Telemetry Orders (From admission, onward)               24 Hour Telemetry Monitoring  Continuous x 24 Hours (Telem)        Question:  Reason for 24 Hour Telemetry  Answer:  Pulmonary Embolism                     Telemetry Reviewed: Normal Sinus Rhythm  Indication for Continued Telemetry Use: No indication for continued use. Will discontinue.              Imaging: Echo    Recent Cultures (last 7 days):         Last 24 Hours Medication List:   Current Facility-Administered Medications   Medication Dose Route Frequency Provider Last Rate    acetaminophen  650 mg Oral Q6H PRN Leonardo Lomas PA-C      calcium carbonate  500 mg Oral BID PRN Leonardo Lomas PA-C      chlorthalidone  25 mg Oral Daily Leonardo Lomas PA-C      enoxaparin  1 mg/kg Subcutaneous Q12H GRACE Leonardo Lomas PA-C      losartan  50 mg Oral Daily Leonardo Lomas PA-C       melatonin  6 mg Oral HS Leonardo Lomas PA-C      ondansetron  4 mg Intravenous Q6H PRN Lenoardo Lomas PA-C      oxyCODONE  10 mg Oral Q4H PRN Leonardo Lomas PA-C      oxyCODONE  5 mg Oral Q4H PRN Leonardo Lomas PA-C      warfarin  10 mg Oral Once (warfarin) Skylar Mayer MD          Today, Patient Was Seen By: Skylar Mayer MD    **Please Note: This note may have been constructed using a voice recognition system.**

## 2024-09-07 NOTE — ASSESSMENT & PLAN NOTE
"Resume home chlorthalidone and losartan (chlorthalidone dose decreased )  /72   Pulse 66   Temp 98.6 °F (37 °C)   Resp 17   Ht 5' 7\" (1.702 m)   Wt 96.2 kg (212 lb 1.3 oz)   SpO2 95%   BMI 33.22 kg/m²   Vitals as per routine   "

## 2024-09-07 NOTE — ASSESSMENT & PLAN NOTE
Patient presents with right-sided rib pain.  Noted the last day or 2 he has had left-sided shoulder pain.  CTA chest reveals Segmental and subsegmental emboli in the right middle lobe and both lower lobes with mild clot burden.   No evidence of strain on imaging  He has IVC filter in place for the last 20 years.  Was previously on Coumadin 20 years ago.  Venous duplex upper extremity- negative for DVT   Echo -EF 56%, grade 1 diastolic dysfunction  Recent Labs     09/06/24  0944 09/07/24  0811 09/08/24  0553   INR 1.11 1.11 1.25*      Copay for eliquis and xarelto- too high, will bridge lovenox-Coumadin  Co-pay for Lovenox is $0 however patient not comfortable to give himself Lovenox at home  Monitor INR daily, goal 2-3    Outpatient hematology consult

## 2024-09-07 NOTE — PLAN OF CARE
Problem: PAIN - ADULT  Goal: Verbalizes/displays adequate comfort level or baseline comfort level  Description: Interventions:  - Encourage patient to monitor pain and request assistance  - Assess pain using appropriate pain scale  - Administer analgesics based on type and severity of pain and evaluate response  - Implement non-pharmacological measures as appropriate and evaluate response  - Consider cultural and social influences on pain and pain management  - Notify physician/advanced practitioner if interventions unsuccessful or patient reports new pain  Outcome: Progressing     Problem: INFECTION - ADULT  Goal: Absence or prevention of progression during hospitalization  Description: INTERVENTIONS:  - Assess and monitor for signs and symptoms of infection  - Monitor lab/diagnostic results  - Monitor all insertion sites, i.e. indwelling lines, tubes, and drains  - Monitor endotracheal if appropriate and nasal secretions for changes in amount and color  - [place] appropriate cooling/warming therapies per order  - Administer medications as ordered  - Instruct and encourage patient and family to use good hand hygiene technique  - Identify and instruct in appropriate isolation precautions for identified infection/condition  Outcome: Progressing     Problem: SAFETY ADULT  Goal: Patient will remain free of falls  Description: INTERVENTIONS:  - Educate patient/family on patient safety including physical limitations  - Instruct patient to call for assistance with activity   - Consult OT/PT to assist with strengthening/mobility   - Keep Call bell within reach  - Keep bed low and locked with side rails adjusted as appropriate  - Keep care items and personal belongings within reach  - Initiate and maintain comfort rounds  - Apply yellow socks and bracelet for high fall risk patients  - Consider moving patient to room near nurses station  Outcome: Progressing

## 2024-09-08 PROBLEM — E87.6 HYPOKALEMIA: Status: ACTIVE | Noted: 2024-09-08

## 2024-09-08 LAB
ANION GAP SERPL CALCULATED.3IONS-SCNC: 7 MMOL/L (ref 4–13)
BASOPHILS # BLD AUTO: 0.04 THOUSANDS/ÂΜL (ref 0–0.1)
BASOPHILS NFR BLD AUTO: 1 % (ref 0–1)
BUN SERPL-MCNC: 23 MG/DL (ref 5–25)
CALCIUM SERPL-MCNC: 8.6 MG/DL (ref 8.4–10.2)
CHLORIDE SERPL-SCNC: 100 MMOL/L (ref 96–108)
CO2 SERPL-SCNC: 30 MMOL/L (ref 21–32)
CREAT SERPL-MCNC: 0.96 MG/DL (ref 0.6–1.3)
EOSINOPHIL # BLD AUTO: 0.17 THOUSAND/ÂΜL (ref 0–0.61)
EOSINOPHIL NFR BLD AUTO: 2 % (ref 0–6)
ERYTHROCYTE [DISTWIDTH] IN BLOOD BY AUTOMATED COUNT: 13.2 % (ref 11.6–15.1)
GFR SERPL CREATININE-BSD FRML MDRD: 80 ML/MIN/1.73SQ M
GLUCOSE SERPL-MCNC: 126 MG/DL (ref 65–140)
HCT VFR BLD AUTO: 38.5 % (ref 36.5–49.3)
HGB BLD-MCNC: 13.1 G/DL (ref 12–17)
IMM GRANULOCYTES # BLD AUTO: 0.03 THOUSAND/UL (ref 0–0.2)
IMM GRANULOCYTES NFR BLD AUTO: 0 % (ref 0–2)
INR PPP: 1.25 (ref 0.85–1.19)
LYMPHOCYTES # BLD AUTO: 1.51 THOUSANDS/ÂΜL (ref 0.6–4.47)
LYMPHOCYTES NFR BLD AUTO: 21 % (ref 14–44)
MCH RBC QN AUTO: 31.5 PG (ref 26.8–34.3)
MCHC RBC AUTO-ENTMCNC: 34 G/DL (ref 31.4–37.4)
MCV RBC AUTO: 93 FL (ref 82–98)
MONOCYTES # BLD AUTO: 0.75 THOUSAND/ÂΜL (ref 0.17–1.22)
MONOCYTES NFR BLD AUTO: 10 % (ref 4–12)
NEUTROPHILS # BLD AUTO: 4.72 THOUSANDS/ÂΜL (ref 1.85–7.62)
NEUTS SEG NFR BLD AUTO: 66 % (ref 43–75)
NRBC BLD AUTO-RTO: 0 /100 WBCS
PLATELET # BLD AUTO: 205 THOUSANDS/UL (ref 149–390)
PMV BLD AUTO: 10.6 FL (ref 8.9–12.7)
POTASSIUM SERPL-SCNC: 2.9 MMOL/L (ref 3.5–5.3)
PROTHROMBIN TIME: 16.2 SECONDS (ref 12.3–15)
RBC # BLD AUTO: 4.16 MILLION/UL (ref 3.88–5.62)
SODIUM SERPL-SCNC: 137 MMOL/L (ref 135–147)
WBC # BLD AUTO: 7.22 THOUSAND/UL (ref 4.31–10.16)

## 2024-09-08 PROCEDURE — 99232 SBSQ HOSP IP/OBS MODERATE 35: CPT | Performed by: INTERNAL MEDICINE

## 2024-09-08 PROCEDURE — 85025 COMPLETE CBC W/AUTO DIFF WBC: CPT | Performed by: INTERNAL MEDICINE

## 2024-09-08 PROCEDURE — 85610 PROTHROMBIN TIME: CPT | Performed by: INTERNAL MEDICINE

## 2024-09-08 PROCEDURE — 80048 BASIC METABOLIC PNL TOTAL CA: CPT | Performed by: INTERNAL MEDICINE

## 2024-09-08 RX ORDER — WARFARIN SODIUM 10 MG/1
10 TABLET ORAL
Status: COMPLETED | OUTPATIENT
Start: 2024-09-08 | End: 2024-09-08

## 2024-09-08 RX ORDER — POTASSIUM CHLORIDE 1500 MG/1
40 TABLET, EXTENDED RELEASE ORAL 2 TIMES DAILY
Status: COMPLETED | OUTPATIENT
Start: 2024-09-08 | End: 2024-09-08

## 2024-09-08 RX ORDER — CHLORTHALIDONE 25 MG/1
12.5 TABLET ORAL DAILY
Status: DISCONTINUED | OUTPATIENT
Start: 2024-09-09 | End: 2024-09-09 | Stop reason: HOSPADM

## 2024-09-08 RX ORDER — POTASSIUM CHLORIDE 1500 MG/1
20 TABLET, EXTENDED RELEASE ORAL DAILY
Status: DISCONTINUED | OUTPATIENT
Start: 2024-09-09 | End: 2024-09-09

## 2024-09-08 RX ORDER — POTASSIUM CHLORIDE 14.9 MG/ML
20 INJECTION INTRAVENOUS ONCE
Status: COMPLETED | OUTPATIENT
Start: 2024-09-08 | End: 2024-09-08

## 2024-09-08 RX ADMIN — POTASSIUM CHLORIDE 20 MEQ: 14.9 INJECTION, SOLUTION INTRAVENOUS at 09:26

## 2024-09-08 RX ADMIN — ENOXAPARIN SODIUM 100 MG: 100 INJECTION SUBCUTANEOUS at 08:17

## 2024-09-08 RX ADMIN — POTASSIUM CHLORIDE 40 MEQ: 1500 TABLET, EXTENDED RELEASE ORAL at 17:08

## 2024-09-08 RX ADMIN — ENOXAPARIN SODIUM 100 MG: 100 INJECTION SUBCUTANEOUS at 21:47

## 2024-09-08 RX ADMIN — POTASSIUM CHLORIDE 40 MEQ: 1500 TABLET, EXTENDED RELEASE ORAL at 09:26

## 2024-09-08 RX ADMIN — WARFARIN SODIUM 10 MG: 10 TABLET ORAL at 17:09

## 2024-09-08 RX ADMIN — LOSARTAN POTASSIUM 50 MG: 50 TABLET, FILM COATED ORAL at 08:17

## 2024-09-08 RX ADMIN — CHLORTHALIDONE 25 MG: 25 TABLET ORAL at 08:17

## 2024-09-08 NOTE — PLAN OF CARE
Problem: PAIN - ADULT  Goal: Verbalizes/displays adequate comfort level or baseline comfort level  Description: Interventions:  - Encourage patient to monitor pain and request assistance  - Assess pain using appropriate pain scale  - Administer analgesics based on type and severity of pain and evaluate response  - Implement non-pharmacological measures as appropriate and evaluate response  - Consider cultural and social influences on pain and pain management  - Notify physician/advanced practitioner if interventions unsuccessful or patient reports new pain  Outcome: Progressing     Problem: SAFETY ADULT  Goal: Patient will remain free of falls  Description: INTERVENTIONS:  - Educate patient/family on patient safety including physical limitations  - Instruct patient to call for assistance with activity   - Consult OT/PT to assist with strengthening/mobility   - Keep Call bell within reach  - Keep bed low and locked with side rails adjusted as appropriate  - Keep care items and personal belongings within reach  - Initiate and maintain comfort rounds  - Make Fall Risk Sign visible to staff  - Offer Toileting every 2 Hours, in advance of need  - Apply yellow socks and bracelet for high fall risk patients  - Consider moving patient to room near nurses station  Outcome: Progressing     Problem: DISCHARGE PLANNING  Goal: Discharge to home or other facility with appropriate resources  Description: INTERVENTIONS:  - Identify barriers to discharge w/patient and caregiver  - Arrange for needed discharge resources and transportation as appropriate  - Identify discharge learning needs (meds, wound care, etc.)  - Arrange for interpretive services to assist at discharge as needed  - Refer to Case Management Department for coordinating discharge planning if the patient needs post-hospital services based on physician/advanced practitioner order or complex needs related to functional status, cognitive ability, or social support  system  Outcome: Progressing

## 2024-09-08 NOTE — PLAN OF CARE
Problem: PAIN - ADULT  Goal: Verbalizes/displays adequate comfort level or baseline comfort level  Description: Interventions:  - Encourage patient to monitor pain and request assistance  - Assess pain using appropriate pain scale  - Administer analgesics based on type and severity of pain and evaluate response  - Implement non-pharmacological measures as appropriate and evaluate response  - Consider cultural and social influences on pain and pain management  - Notify physician/advanced practitioner if interventions unsuccessful or patient reports new pain  Outcome: Progressing     Problem: INFECTION - ADULT  Goal: Absence or prevention of progression during hospitalization  Description: INTERVENTIONS:  - Assess and monitor for signs and symptoms of infection  - Monitor lab/diagnostic results  - Monitor all insertion sites, i.e. indwelling lines, tubes, and drains  - Monitor endotracheal if appropriate and nasal secretions for changes in amount and color  - [place] appropriate cooling/warming therapies per order  - Administer medications as ordered  - Instruct and encourage patient and family to use good hand hygiene technique  - Identify and instruct in appropriate isolation precautions for identified infection/condition  Outcome: Progressing     Problem: SAFETY ADULT  Goal: Patient will remain free of falls  Description: INTERVENTIONS:  - Educate patient/family on patient safety including physical limitations  - Instruct patient to call for assistance with activity   - Consult OT/PT to assist with strengthening/mobility   - Keep Call bell within reach  - Keep bed low and locked with side rails adjusted as appropriate  - Keep care items and personal belongings within reach  - Initiate and maintain comfort rounds  - Make Fall Risk Sign visible to staff  - Offer Toileting every 2 Hours, in advance of need  - Apply yellow socks and bracelet for high fall risk patients  - Consider moving patient to room near nurses  station  Outcome: Progressing  Goal: Maintain or return to baseline ADL function  Description: INTERVENTIONS:  -  Assess patient's ability to carry out ADLs; assess patient's baseline for ADL function and identify physical deficits which impact ability to perform ADLs (bathing, care of mouth/teeth, toileting, grooming, dressing, etc.)  - Assess/evaluate cause of self-care deficits   - Assess range of motion  - Assess patient's mobility; develop plan if impaired  - Assess patient's need for assistive devices and provide as appropriate  - Encourage maximum independence but intervene and supervise when necessary  - Involve family in performance of ADLs  - Assess for home care needs following discharge   - Consider OT consult to assist with ADL evaluation and planning for discharge  - Provide patient education as appropriate  Outcome: Progressing  Goal: Maintains/Returns to pre admission functional level  Description: INTERVENTIONS:  - Perform AM-PAC 6 Click Basic Mobility/ Daily Activity assessment daily.  - Set and communicate daily mobility goal to care team and patient/family/caregiver.   - Collaborate with rehabilitation services on mobility goals if consulted  - Perform Range of Motion 3 times a day.  - Reposition patient every 2 hours.  - Dangle patient 3 times a day  - Stand patient 3 times a day  - Ambulate patient 3 times a day  - Out of bed to chair 3 times a day   - Out of bed for meals 3 times a day  - Out of bed for toileting  - Record patient progress and toleration of activity level   Outcome: Progressing     Problem: Knowledge Deficit  Goal: Patient/family/caregiver demonstrates understanding of disease process, treatment plan, medications, and discharge instructions  Description: Complete learning assessment and assess knowledge base.  Interventions:  - Provide teaching at level of understanding  - Provide teaching via preferred learning methods  Outcome: Progressing     Problem: DISCHARGE  PLANNING  Goal: Discharge to home or other facility with appropriate resources  Description: INTERVENTIONS:  - Identify barriers to discharge w/patient and caregiver  - Arrange for needed discharge resources and transportation as appropriate  - Identify discharge learning needs (meds, wound care, etc.)  - Arrange for interpretive services to assist at discharge as needed  - Refer to Case Management Department for coordinating discharge planning if the patient needs post-hospital services based on physician/advanced practitioner order or complex needs related to functional status, cognitive ability, or social support system  Outcome: Progressing

## 2024-09-08 NOTE — ASSESSMENT & PLAN NOTE
Potassium 2.9  Secondary to chlorthalidone  Chlorthalidone dose decreased  Continue with potassium supplements  Telemetry for 24 hours  Monitor in a.m.

## 2024-09-08 NOTE — UTILIZATION REVIEW
OBSERVATION 9/5/24@1952 CONVERTED TO INPATIENT 9/7/24@1543 DUE TO ACUTE PE   From admission, onward)  Start   Ordered   09/07/24 1543  INPATIENT ADMISSION  Once        Transfer Service: Hospitalist     Question Answer Comment   Level of Care Med Surg    Estimated length of stay More than 2 Midnights    Certification I certify that inpatient services are medically necessary for this patient for a duration of greater than two midnights. See H&P and MD Progress Notes for additional information about the patient's course of treatment.                 Continued Stay Review    Date: 9/7                          Current Patient Class: Inpatient  Current Level of Care: MEd/surg    HPI:69 y.o. male initially admitted on 9/7     Assessment/Plan: CTA chest reveals Segmental and subsegmental emboli in the right middle lobe and both lower lobes with mild clot burden.  No evidence.  Has IVC filter in.  Monitor iNR closely.  COpay for eliquis, xarelto too high.  Will bridge lovenox to coumadin.  Is not comfortable giving himself lovenox at home.    9/8 UPDate:  Goal for INR 2-3, today it is 1.25. Continue lovenox/coumadin bridge.   Potassium today is 2.9.  Monitor tele. Give supplement. Recheck. BMp.       Vital Signs (last 3 days)       Date/Time Temp Pulse Resp BP MAP (mmHg) SpO2 O2 Device Patient Position - Orthostatic VS Pain    09/08/24 07:51:43 98.6 °F (37 °C) 66 17 130/72 91 95 % -- -- --    09/07/24 22:15:49 98.8 °F (37.1 °C) 78 -- 121/72 88 95 % -- -- --    09/07/24 2049 -- -- -- -- -- 94 % None (Room air) -- No Pain    09/07/24 15:09:02 99.4 °F (37.4 °C) 89 18 118/60 79 93 % -- -- --    09/07/24 0900 -- -- -- -- -- -- None (Room air) -- No Pain    09/07/24 07:20:01 98.9 °F (37.2 °C) 71 19 127/56 80 93 % -- -- --    09/06/24 23:53:14 99.2 °F (37.3 °C) 84 18 117/67 84 92 % -- -- --    09/06/24 2120 -- -- -- -- -- 93 % None (Room air) -- No Pain    09/06/24 1724 -- 92 -- 139/84 -- 94 % -- -- --    09/06/24 07:34:26 98.8 °F  (37.1 °C) 81 18 139/84 102 94 % -- -- --    09/06/24 0729 -- -- -- -- -- -- None (Room air) -- 3    09/06/24 01:40:47 99.5 °F (37.5 °C) 91 18 123/79 94 96 % -- -- --    09/05/24 2140 -- -- -- -- -- -- None (Room air) -- 5    09/05/24 20:27:24 99.1 °F (37.3 °C) 96 18 131/89 103 96 % None (Room air) Lying --    09/05/24 2027 -- -- -- -- -- -- -- -- 4    09/05/24 1915 -- 83 18 134/84 105 96 % None (Room air) Sitting --    09/05/24 1744 -- 82 18 139/61 -- 96 % None (Room air) Sitting --    09/05/24 1133 97.4 °F (36.3 °C) 54 18 133/86 -- 97 % -- -- 4          Weight (last 2 days)       Date/Time Weight    09/06/24 1724 96.2 (212.08)    09/06/24 07:34:26 96.2 (212.08)              Pertinent Labs/Diagnostic Results:   Radiology:  VAS upper limb venous duplex scan, unilateral/limited   Final Interpretation by Carlo Aldana MD (09/06 7818)      CTA ED chest PE study   Final Interpretation by Tamiko Early MD (09/05 4758)      Segmental and subsegmental emboli in the right middle lobe and both lower lobes with mild clot burden.      RV/LV diameter ratio less than 1 with nothing to indicate right heart strain.      Small left pleural effusion.         I notified Dr. RITIKA KAY on 9/5/2024 6:36 PM by epic secure chat and he responded immediately.                  Workstation performed: [id number]         CT chest abdomen pelvis w contrast   Final Interpretation by Leonardo Roberts MD (09/05 1656)   Addendum (preliminary) 1 of 1 by Leonardo Roberts MD (09/05 1656)   ADDENDUM:      Possible filling defect in a proximal segmental branch of the pulmonary    artery in the right middle lobe (series 2, image 67). Hypoattenuation    within a segmental branch of the pulmonary artery in the right lower lobe    (series 2, image 78). However,    technique is suboptimal for assessment for pulmonary emboli. These    findings were discussed with Dr. Kay at 16:50.      Final   1.  New trace left pleural effusion.   2.  No  acute abdominopelvic pathology.   3.  Multiple bilateral renal calculi measuring up to 10 mm. No hydroureteronephrosis.                  Workstation performed: [id number]         XR chest 2 views   Final Interpretation by Robles Alatorre MD (09/05 2768)   Small left basilar pleural effusion with lungs otherwise clear.            Workstation performed: [id number]           Cardiology:  Echo complete w/ contrast if indicated   Final Result by Naomi Stoddard MD (09/06 1327)        Left Ventricle: Left ventricular cavity size is normal. Wall thickness    is normal. The left ventricular ejection fraction is 56% by biplane    measurement. Systolic function is normal. Wall motion is normal. Diastolic    function is mildly abnormal, consistent with grade I (abnormal)    relaxation.     Right Ventricle: Right ventricular cavity size is normal. Systolic    function is normal.     Left Atrium: The atrium is normal in size.     Right Atrium: The atrium is normal in size.         ECG 12 lead   Final Result by Naomi Stoddard MD (09/06 2251)   Normal sinus rhythm   Possible Left atrial enlargement   Possible Inferior infarct , age undetermined   Abnormal ECG   When compared with ECG of 05-SEP-2024 11:37, (unconfirmed)   Borderline criteria for Inferior infarct are now Present   T wave inversion now evident in Inferior leads   Nonspecific T wave abnormality no longer evident in Lateral leads   Confirmed by Naomi Stoddard ([id number]) on 9/6/2024 10:51:54 PM      ECG 12 lead   Final Result by Naomi Stoddard MD (09/06 2251)   Normal sinus rhythm   Nonspecific ST abnormality   Abnormal ECG   When compared with ECG of 05-SEP-2024 10:54, (unconfirmed)   Questionable change in QRS axis   Nonspecific T wave abnormality no longer evident in Inferior leads   Nonspecific T wave abnormality now evident in Lateral leads   Confirmed by Naomi Stoddard ([id number]) on 9/6/2024 10:51:38 PM            Results from last 7 days   Lab Units  09/08/24  0553 09/07/24  0443 09/06/24  0430 09/05/24  1138   WBC Thousand/uL 7.22 8.56 8.94 9.62   HEMOGLOBIN g/dL 13.1 13.9 14.8 15.1   HEMATOCRIT % 38.5 40.7 42.8 42.7   PLATELETS Thousands/uL 205 205 212 203   TOTAL NEUT ABS Thousands/µL 4.72 5.78 6.59 7.10         Results from last 7 days   Lab Units 09/08/24  0553 09/07/24  0443 09/06/24  0430 09/05/24  1138   SODIUM mmol/L 137 136 135 136   POTASSIUM mmol/L 2.9* 3.1* 3.1* 3.5   CHLORIDE mmol/L 100 98 100 101   CO2 mmol/L 30 30 26 28   ANION GAP mmol/L 7 8 9 7   BUN mg/dL 23 24 20 17   CREATININE mg/dL 0.96 1.20 1.12 0.95   EGFR ml/min/1.73sq m 80 61 66 81   CALCIUM mg/dL 8.6 8.8 9.4 9.5     Results from last 7 days   Lab Units 09/05/24  1138   AST U/L 14   ALT U/L 16   ALK PHOS U/L 72   TOTAL PROTEIN g/dL 7.3   ALBUMIN g/dL 3.9   TOTAL BILIRUBIN mg/dL 1.36*         Results from last 7 days   Lab Units 09/08/24  0553 09/07/24 0443 09/06/24  0430 09/05/24  1138   GLUCOSE RANDOM mg/dL 126 122 170* 135         Results from last 7 days   Lab Units 09/05/24  1311 09/05/24  1138   HS TNI 0HR ng/L  --  4   HS TNI 2HR ng/L 4  --    HSTNI D2 ng/L 0  --      Results from last 7 days   Lab Units 09/05/24  1138   D-DIMER QUANTITATIVE ug/ml FEU 1.74*     Results from last 7 days   Lab Units 09/08/24  0553 09/07/24  0811 09/06/24  0944   PROTIME seconds 16.2* 14.8 14.8   INR  1.25* 1.11 1.11     Results from last 7 days   Lab Units 09/05/24  1310   CLARITY UA  Clear   COLOR UA  Yellow   SPEC GRAV UA  1.020   PH UA  7.0   GLUCOSE UA mg/dl Negative   KETONES UA mg/dl Negative   BLOOD UA  Negative   PROTEIN UA mg/dl Negative   NITRITE UA  Negative   BILIRUBIN UA  Negative   UROBILINOGEN UA (BE) mg/dl <2.0   LEUKOCYTES UA  Negative     Medications:   Scheduled Medications:  chlorthalidone, 25 mg, Oral, Daily  enoxaparin, 1 mg/kg, Subcutaneous, Q12H GRACE  losartan, 50 mg, Oral, Daily  melatonin, 6 mg, Oral, HS      Continuous IV Infusions:     PRN Meds:  acetaminophen, 650 mg,  Oral, Q6H PRN  calcium carbonate, 500 mg, Oral, BID PRN  ondansetron, 4 mg, Intravenous, Q6H PRN  oxyCODONE, 10 mg, Oral, Q4H PRN  oxyCODONE, 5 mg, Oral, Q4H PRN        Discharge Plan: D    Network Utilization Review Department  ATTENTION: Please call with any questions or concerns to [phone] and carefully listen to the prompts so that you are directed to the right person. All voicemails are confidential.   For Discharge needs, contact Care Management DC Support Team at [phone] opt. 2  Send all requests for admission clinical reviews, approved or denied determinations and any other requests to dedicated fax number below belonging to the campus where the patient is receiving treatment. List of dedicated fax numbers for the Facilities:  FACILITY NAME UR FAX NUMBER   ADMISSION DENIALS (Administrative/Medical Necessity) [phone]   DISCHARGE SUPPORT TEAM (NETWORK) [phone]   PARENT CHILD HEALTH (Maternity/NICU/Pediatrics) [phone]   [facility] [phone]   [facility] [phone]   [facility] [phone]   [facility] [phone]   [facility] [phone]   [facility] [phone]   [facility] [phone]   [facility] [phone]   [facility] [phone]   [facility] [phone]   [facility] [phone]   [facility] [phone]

## 2024-09-08 NOTE — PROGRESS NOTES
"[facility]  Progress Note  Name: Danilo Ta I  MRN: [id number]  Unit/Bed#: -01 I Date of Admission: 9/5/2024   Date of Service: 9/8/2024 I Hospital Day: 1    Assessment & Plan   * Acute pulmonary embolism without acute cor pulmonale (HCC)  Assessment & Plan  Patient presents with right-sided rib pain.  Noted the last day or 2 he has had left-sided shoulder pain.  CTA chest reveals Segmental and subsegmental emboli in the right middle lobe and both lower lobes with mild clot burden.   No evidence of strain on imaging  He has IVC filter in place for the last 20 years.  Was previously on Coumadin 20 years ago.  Venous duplex upper extremity- negative for DVT   Echo -EF 56%, grade 1 diastolic dysfunction  Recent Labs     09/06/24  0944 09/07/24  0811 09/08/24  0553   INR 1.11 1.11 1.25*      Copay for eliquis and xarelto- too high, will bridge lovenox-Coumadin  Co-pay for Lovenox is $0 however patient not comfortable to give himself Lovenox at home  Monitor INR daily, goal 2-3    Outpatient hematology consult    Hypokalemia  Assessment & Plan  Potassium 2.9  Secondary to chlorthalidone  Chlorthalidone dose decreased  Continue with potassium supplements  Telemetry for 24 hours  Monitor in a.m.    Hypertension  Assessment & Plan  Resume home chlorthalidone and losartan (chlorthalidone dose decreased )  /72   Pulse 66   Temp 98.6 °F (37 °C)   Resp 17   Ht 5' 7\" (1.702 m)   Wt 96.2 kg (212 lb 1.3 oz)   SpO2 95%   BMI 33.22 kg/m²   Vitals as per routine     GERD (gastroesophageal reflux disease)  Assessment & Plan  Continue PPI               VTE Pharmacologic Prophylaxis: VTE Score: 6 Moderate Risk (Score 3-4) - Pharmacological DVT Prophylaxis Ordered: enoxaparin (Lovenox).    Mobility:   Basic Mobility Inpatient Raw Score: 24  JH-HLM Goal: 8: Walk 250 feet or more  JH-HLM Achieved: 7: Walk 25 feet or more  JH-HLM Goal achieved. Continue to encourage appropriate " mobility.    Patient Centered Rounds: I performed bedside rounds with nursing staff today.   Discussions with Specialists or Other Care Team Provider: yes    Education and Discussions with Family / Patient: Patient declined call to .  Will contact his brother himself    Total Time Spent on Date of Encounter in care of patient: 39 mins. This time was spent on one or more of the following: performing physical exam; counseling and coordination of care; obtaining or reviewing history; documenting in the medical record; reviewing/ordering tests, medications or procedures; communicating with other healthcare professionals and discussing with patient's family/caregivers.    Current Length of Stay: 1 day(s)  Current Patient Status: Inpatient   Certification Statement: The patient will continue to require additional inpatient hospital stay due to pending INR optimization  Discharge Plan: Anticipate discharge in 24-48 hrs to home.    Code Status: Level 1 - Full Code    Subjective:   Seen and examined at bedside  Awake and alert  Denies any chest pain or shortness of breath  Comfortable    Objective:     Vitals:   Temp (24hrs), Av.9 °F (37.2 °C), Min:98.6 °F (37 °C), Max:99.4 °F (37.4 °C)    Temp:  [98.6 °F (37 °C)-99.4 °F (37.4 °C)] 98.6 °F (37 °C)  HR:  [66-89] 66  Resp:  [17-18] 17  BP: (118-130)/(60-72) 130/72  SpO2:  [93 %-95 %] 95 %  Body mass index is 33.22 kg/m².     Input and Output Summary (last 24 hours):     Intake/Output Summary (Last 24 hours) at 2024 1127  Last data filed at 2024 0928  Gross per 24 hour   Intake 960 ml   Output 1000 ml   Net -40 ml       Physical Exam:   Physical Exam  Vitals reviewed.   Constitutional:       Appearance: Normal appearance.   HENT:      Head: Atraumatic.      Mouth/Throat:      Mouth: Mucous membranes are dry.   Cardiovascular:      Rate and Rhythm: Normal rate.      Heart sounds: Normal heart sounds.   Pulmonary:      Effort: No respiratory distress.    Abdominal:      General: Bowel sounds are normal.   Musculoskeletal:      Right lower leg: No edema.      Left lower leg: No edema.   Skin:     General: Skin is dry.      Capillary Refill: Capillary refill takes less than 2 seconds.   Neurological:      Mental Status: He is alert. Mental status is at baseline.   Psychiatric:         Mood and Affect: Mood normal.          Additional Data:     Labs:  Results from last 7 days   Lab Units 09/08/24  0553   WBC Thousand/uL 7.22   HEMOGLOBIN g/dL 13.1   HEMATOCRIT % 38.5   PLATELETS Thousands/uL 205   SEGS PCT % 66   LYMPHO PCT % 21   MONO PCT % 10   EOS PCT % 2     Results from last 7 days   Lab Units 09/08/24  0553 09/06/24  0430 09/05/24  1138   SODIUM mmol/L 137   < > 136   POTASSIUM mmol/L 2.9*   < > 3.5   CHLORIDE mmol/L 100   < > 101   CO2 mmol/L 30   < > 28   BUN mg/dL 23   < > 17   CREATININE mg/dL 0.96   < > 0.95   ANION GAP mmol/L 7   < > 7   CALCIUM mg/dL 8.6   < > 9.5   ALBUMIN g/dL  --   --  3.9   TOTAL BILIRUBIN mg/dL  --   --  1.36*   ALK PHOS U/L  --   --  72   ALT U/L  --   --  16   AST U/L  --   --  14   GLUCOSE RANDOM mg/dL 126   < > 135    < > = values in this interval not displayed.     Results from last 7 days   Lab Units 09/08/24  0553   INR  1.25*                   Lines/Drains:  Invasive Devices       Peripheral Intravenous Line  Duration             Peripheral IV 09/05/24 Distal;Right;Upper;Ventral (anterior) Arm 2 days                      Telemetry:  Telemetry Orders (From admission, onward)               24 Hour Telemetry Monitoring  Continuous x 24 Hours (Telem)        Question:  Reason for 24 Hour Telemetry  Answer:  Metabolic/electrolyte disturbance with high probability of dysrhythmia. K level <3 or >6 OR KCL infusion >10mEq/hr                     Telemetry Reviewed: Normal Sinus Rhythm  Indication for Continued Telemetry Use: Metabolic/electrolyte disturbance with high probability of dysrhythmia             Imaging: No pertinent imaging  reviewed.    Recent Cultures (last 7 days):         Last 24 Hours Medication List:   Current Facility-Administered Medications   Medication Dose Route Frequency Provider Last Rate    acetaminophen  650 mg Oral Q6H PRN Leonardo Lomas PA-C      calcium carbonate  500 mg Oral BID PRN Leonardo Lomas PA-C      [START ON 9/9/2024] chlorthalidone  12.5 mg Oral Daily Skylar Mayer MD      enoxaparin  1 mg/kg Subcutaneous Q12H [facility] Leonardo Lomas PA-C      losartan  50 mg Oral Daily Leonardo Lomas PA-C      melatonin  6 mg Oral HS Leonardo Lomas PA-C      ondansetron  4 mg Intravenous Q6H PRN Leonardo Lomas PA-C      oxyCODONE  10 mg Oral Q4H PRN Leonardo Lomas PA-C      oxyCODONE  5 mg Oral Q4H PRN Leonardo Lomas PA-C      [START ON 9/9/2024] potassium chloride  20 mEq Oral Daily Skylar Mayer MD      potassium chloride  40 mEq Oral BID Skylar Mayer MD      warfarin  10 mg Oral Once (warfarin) Skylar Mayer MD          Today, Patient Was Seen By: Skylar Mayer MD    **Please Note: This note may have been constructed using a voice recognition system.**

## 2024-09-09 VITALS
HEIGHT: 67 IN | SYSTOLIC BLOOD PRESSURE: 132 MMHG | OXYGEN SATURATION: 92 % | BODY MASS INDEX: 33.29 KG/M2 | DIASTOLIC BLOOD PRESSURE: 74 MMHG | RESPIRATION RATE: 21 BRPM | TEMPERATURE: 97.9 F | WEIGHT: 212.08 LBS | HEART RATE: 81 BPM

## 2024-09-09 LAB
ANION GAP SERPL CALCULATED.3IONS-SCNC: 7 MMOL/L (ref 4–13)
BASOPHILS # BLD AUTO: 0.04 THOUSANDS/ÂΜL (ref 0–0.1)
BASOPHILS NFR BLD AUTO: 1 % (ref 0–1)
BUN SERPL-MCNC: 20 MG/DL (ref 5–25)
CALCIUM SERPL-MCNC: 8.8 MG/DL (ref 8.4–10.2)
CHLORIDE SERPL-SCNC: 102 MMOL/L (ref 96–108)
CO2 SERPL-SCNC: 27 MMOL/L (ref 21–32)
CREAT SERPL-MCNC: 0.94 MG/DL (ref 0.6–1.3)
EOSINOPHIL # BLD AUTO: 0.22 THOUSAND/ÂΜL (ref 0–0.61)
EOSINOPHIL NFR BLD AUTO: 4 % (ref 0–6)
ERYTHROCYTE [DISTWIDTH] IN BLOOD BY AUTOMATED COUNT: 13.2 % (ref 11.6–15.1)
GFR SERPL CREATININE-BSD FRML MDRD: 82 ML/MIN/1.73SQ M
GLUCOSE SERPL-MCNC: 126 MG/DL (ref 65–140)
HCT VFR BLD AUTO: 40.9 % (ref 36.5–49.3)
HGB BLD-MCNC: 13.9 G/DL (ref 12–17)
IMM GRANULOCYTES # BLD AUTO: 0.02 THOUSAND/UL (ref 0–0.2)
IMM GRANULOCYTES NFR BLD AUTO: 0 % (ref 0–2)
INR PPP: 1.53 (ref 0.85–1.19)
LYMPHOCYTES # BLD AUTO: 1.59 THOUSANDS/ÂΜL (ref 0.6–4.47)
LYMPHOCYTES NFR BLD AUTO: 25 % (ref 14–44)
MCH RBC QN AUTO: 31.3 PG (ref 26.8–34.3)
MCHC RBC AUTO-ENTMCNC: 34 G/DL (ref 31.4–37.4)
MCV RBC AUTO: 92 FL (ref 82–98)
MONOCYTES # BLD AUTO: 0.61 THOUSAND/ÂΜL (ref 0.17–1.22)
MONOCYTES NFR BLD AUTO: 10 % (ref 4–12)
NEUTROPHILS # BLD AUTO: 3.88 THOUSANDS/ÂΜL (ref 1.85–7.62)
NEUTS SEG NFR BLD AUTO: 60 % (ref 43–75)
NRBC BLD AUTO-RTO: 0 /100 WBCS
PLATELET # BLD AUTO: 223 THOUSANDS/UL (ref 149–390)
PMV BLD AUTO: 10.2 FL (ref 8.9–12.7)
POTASSIUM SERPL-SCNC: 3.4 MMOL/L (ref 3.5–5.3)
PROTHROMBIN TIME: 18.8 SECONDS (ref 12.3–15)
RBC # BLD AUTO: 4.44 MILLION/UL (ref 3.88–5.62)
SODIUM SERPL-SCNC: 136 MMOL/L (ref 135–147)
WBC # BLD AUTO: 6.36 THOUSAND/UL (ref 4.31–10.16)

## 2024-09-09 PROCEDURE — 85025 COMPLETE CBC W/AUTO DIFF WBC: CPT | Performed by: INTERNAL MEDICINE

## 2024-09-09 PROCEDURE — 85610 PROTHROMBIN TIME: CPT | Performed by: INTERNAL MEDICINE

## 2024-09-09 PROCEDURE — 99239 HOSP IP/OBS DSCHRG MGMT >30: CPT | Performed by: INTERNAL MEDICINE

## 2024-09-09 PROCEDURE — 80048 BASIC METABOLIC PNL TOTAL CA: CPT | Performed by: INTERNAL MEDICINE

## 2024-09-09 RX ORDER — ENOXAPARIN SODIUM 100 MG/ML
1 INJECTION SUBCUTANEOUS EVERY 12 HOURS
Qty: 10 ML | Refills: 0 | Status: SHIPPED | OUTPATIENT
Start: 2024-09-09 | End: 2024-09-14

## 2024-09-09 RX ORDER — POTASSIUM CHLORIDE 1500 MG/1
40 TABLET, EXTENDED RELEASE ORAL DAILY
Status: DISCONTINUED | OUTPATIENT
Start: 2024-09-09 | End: 2024-09-09 | Stop reason: HOSPADM

## 2024-09-09 RX ORDER — ENOXAPARIN SODIUM 300 MG/3ML
1 INJECTION INTRAVENOUS; SUBCUTANEOUS EVERY 12 HOURS
Status: DISCONTINUED | OUTPATIENT
Start: 2024-09-09 | End: 2024-09-09

## 2024-09-09 RX ORDER — CHLORTHALIDONE 25 MG/1
12.5 TABLET ORAL DAILY
Qty: 30 TABLET | Refills: 0 | Status: SHIPPED | OUTPATIENT
Start: 2024-09-09

## 2024-09-09 RX ORDER — POTASSIUM CHLORIDE 1500 MG/1
20 TABLET, EXTENDED RELEASE ORAL DAILY
Qty: 180 TABLET | Refills: 0 | Status: SHIPPED | OUTPATIENT
Start: 2024-09-09

## 2024-09-09 RX ORDER — WARFARIN SODIUM 5 MG/1
5 TABLET ORAL
Qty: 30 TABLET | Refills: 0 | Status: SHIPPED | OUTPATIENT
Start: 2024-09-09 | End: 2024-09-11

## 2024-09-09 RX ADMIN — ENOXAPARIN SODIUM 100 MG: 100 INJECTION SUBCUTANEOUS at 09:22

## 2024-09-09 RX ADMIN — CHLORTHALIDONE 12.5 MG: 25 TABLET ORAL at 09:20

## 2024-09-09 RX ADMIN — LOSARTAN POTASSIUM 50 MG: 50 TABLET, FILM COATED ORAL at 09:21

## 2024-09-09 RX ADMIN — POTASSIUM CHLORIDE 40 MEQ: 1500 TABLET, EXTENDED RELEASE ORAL at 09:22

## 2024-09-09 NOTE — PLAN OF CARE
Problem: PAIN - ADULT  Goal: Verbalizes/displays adequate comfort level or baseline comfort level  Description: Interventions:  - Encourage patient to monitor pain and request assistance  - Assess pain using appropriate pain scale  - Administer analgesics based on type and severity of pain and evaluate response  - Implement non-pharmacological measures as appropriate and evaluate response  - Consider cultural and social influences on pain and pain management  - Notify physician/advanced practitioner if interventions unsuccessful or patient reports new pain  Outcome: Progressing     Problem: INFECTION - ADULT  Goal: Absence or prevention of progression during hospitalization  Description: INTERVENTIONS:  - Assess and monitor for signs and symptoms of infection  - Monitor lab/diagnostic results  - Monitor all insertion sites, i.e. indwelling lines, tubes, and drains  - Monitor endotracheal if appropriate and nasal secretions for changes in amount and color  - [place] appropriate cooling/warming therapies per order  - Administer medications as ordered  - Instruct and encourage patient and family to use good hand hygiene technique  - Identify and instruct in appropriate isolation precautions for identified infection/condition  Outcome: Progressing

## 2024-09-09 NOTE — NURSING NOTE
Patient has IV that expires 9/9 at 1300. Patient was told today he could be discharged and if he can give himself Lovenox himself. Patient doesn't plan on being here by lunch time and told RN he only wanted blood work and if the doctors stated he had to stay he would consider changing it then. RN is passing off to day shift RN.

## 2024-09-09 NOTE — UTILIZATION REVIEW
NOTIFICATION OF INPATIENT ADMISSION   AUTHORIZATION REQUEST   SERVICING FACILITY:   [place] [address]  Tax ID: [id number]  NPI: [id number] ATTENDING PROVIDER:  Attending Name and NPI#: Gayle Gordon Md [[id number]]  Address: [address] [place] [address]  Phone: [phone]   ADMISSION INFORMATION:  Place of Service: Inpatient [facility]  Place of Service Code: 21  Inpatient Admission Date/Time: 9/7/24  3:42 PM  Discharge Date/Time: 9/9/2024  1:22 PM  Admitting Diagnosis Code/Description:  Rib pain [R07.81]  Bilateral pulmonary embolism (HCC) [I26.99]     UTILIZATION REVIEW CONTACT:  Kinsey Marcos Utilization   Network Utilization Review Department  Phone: [phone]  Fax: [phone]  Email: [email or web address]  Contact for approvals/pending authorizations, clinical reviews, and discharge.     PHYSICIAN ADVISORY SERVICES:  Medical Necessity Denial & Ldew-wd-Arug Review  Phone: [phone]  Fax: [phone]  Email: [email or web address]     DISCHARGE SUPPORT TEAM:  For Patients Discharge Needs & Updates  Phone: [phone] opt. 2 Fax: [phone]  Email: [email or web address]

## 2024-09-09 NOTE — DISCHARGE INSTR - AVS FIRST PAGE
Check your INR in 2- 3 days. If INR > 2.0 stop taking the Lovenox shots. Continue with oral medication and follow up with your family doctor

## 2024-09-09 NOTE — PLAN OF CARE
Problem: PAIN - ADULT  Goal: Verbalizes/displays adequate comfort level or baseline comfort level  Description: Interventions:  - Encourage patient to monitor pain and request assistance  - Assess pain using appropriate pain scale  - Administer analgesics based on type and severity of pain and evaluate response  - Implement non-pharmacological measures as appropriate and evaluate response  - Consider cultural and social influences on pain and pain management  - Notify physician/advanced practitioner if interventions unsuccessful or patient reports new pain  Outcome: Progressing     Problem: INFECTION - ADULT  Goal: Absence or prevention of progression during hospitalization  Description: INTERVENTIONS:  - Assess and monitor for signs and symptoms of infection  - Monitor lab/diagnostic results  - Monitor all insertion sites, i.e. indwelling lines, tubes, and drains  - Monitor endotracheal if appropriate and nasal secretions for changes in amount and color  - [place] appropriate cooling/warming therapies per order  - Administer medications as ordered  - Instruct and encourage patient and family to use good hand hygiene technique  - Identify and instruct in appropriate isolation precautions for identified infection/condition  Outcome: Progressing

## 2024-09-10 NOTE — ASSESSMENT & PLAN NOTE
Patient presents with right-sided rib pain.  Noted the last day or 2 he has had left-sided shoulder pain.  CTA chest reveals Segmental and subsegmental emboli in the right middle lobe and both lower lobes with mild clot burden.   No evidence of strain on imaging  He has IVC filter in place for the last 20 years.  Was previously on Coumadin 20 years ago.  Venous duplex upper extremity- negative for DVT   Echo -EF 56%, grade 1 diastolic dysfunction  Recent Labs     09/07/24  0811 09/08/24  0553 09/09/24  0508   INR 1.11 1.25* 1.53*        Copay for eliquis and xarelto- too high, so patient was bridged with Lovenox Coumadin  Patient agreeable to be discharged on Lovenox injections.  Advised the patient to continue Lovenox injections twice a day along with warfarin and have a repeat INR in 2 to 3 days.  If INR is greater than 2 he should stop Lovenox shots.  Follow-up with family doctor for further management of INR and dosage of Coumadin   Outpatient hematology referral

## 2024-09-10 NOTE — ASSESSMENT & PLAN NOTE
Recent Labs     09/07/24  0443 09/08/24  0553 09/09/24  0508   K 3.1* 2.9* 3.4*      Potassium 2.9  Secondary to chlorthalidone  Chlorthalidone dose decreased  Continue with potassium supplements

## 2024-09-10 NOTE — DISCHARGE SUMMARY
Critical access hospital  Discharge- Danilo Padilla Jr. 1955, 69 y.o. male MRN: [id number]  Unit/Bed#: [facility]-01 Encounter: [id number]  Primary Care Provider: Fredis Geiger MD   Date and time admitted to hospital: 9/5/2024 11:41 AM    * Acute pulmonary embolism without acute cor pulmonale (HCC)  Assessment & Plan  Patient presents with right-sided rib pain.  Noted the last day or 2 he has had left-sided shoulder pain.  CTA chest reveals Segmental and subsegmental emboli in the right middle lobe and both lower lobes with mild clot burden.   No evidence of strain on imaging  He has IVC filter in place for the last 20 years.  Was previously on Coumadin 20 years ago.  Venous duplex upper extremity- negative for DVT   Echo -EF 56%, grade 1 diastolic dysfunction  Recent Labs     09/07/24  0811 09/08/24  0553 09/09/24  0508   INR 1.11 1.25* 1.53*        Copay for eliquis and xarelto- too high, so patient was bridged with Lovenox Coumadin  Patient agreeable to be discharged on Lovenox injections.  Advised the patient to continue Lovenox injections twice a day along with warfarin and have a repeat INR in 2 to 3 days.  If INR is greater than 2 he should stop Lovenox shots.  Follow-up with family doctor for further management of INR and dosage of Coumadin   Outpatient hematology referral    Hypokalemia  Assessment & Plan  Recent Labs     09/07/24  0443 09/08/24  0553 09/09/24  0508   K 3.1* 2.9* 3.4*      Potassium 2.9  Secondary to chlorthalidone  Chlorthalidone dose decreased  Continue with potassium supplements      Hypertension  Assessment & Plan  Continue losartan and chlorthalidone 12.5 mg daily    GERD (gastroesophageal reflux disease)  Assessment & Plan  Continue PPI        Medical Problems       Resolved Problems  Date Reviewed: 9/8/2024   None       Discharging Physician / Practitioner: Gayle Gordon MD  PCP: Fredis Geiger MD  Admission Date:   Admission Orders (From admission, onward)        Ordered        09/07/24 1542  INPATIENT ADMISSION  Once            09/05/24 1951  Place in Observation  Once                          Discharge Date: 09/09/24    Consultations During Hospital Stay:  None    Procedures Performed:   None    Significant Findings / Test Results:   CTA ED chest PE study  Result Date: 9/5/2024  Impression: Segmental and subsegmental emboli in the right middle lobe and both lower lobes with mild clot burden. RV/LV diameter ratio less than 1 with nothing to indicate right heart strain. Small left pleural effusion.    CT chest abdomen pelvis w contrast  Addendum Date: 9/5/2024    ADDENDUM: Possible filling defect in a proximal segmental branch of the pulmonary artery in the right middle lobe (series 2, image 67). Hypoattenuation within a segmental branch of the pulmonary artery in the right lower lobe (series 2, image 78). However, technique is suboptimal for assessment for pulmonary emboli.     Result Date: 9/5/2024  Impression: 1.  New trace left pleural effusion. 2.  No acute abdominopelvic pathology. 3.  Multiple bilateral renal calculi measuring up to 10 mm. No hydroureteronephrosis.     XR chest 2 views  Result Date: 9/5/2024  Impression: Small left basilar pleural effusion with lungs otherwise clear     Incidental Findings:   none     Test Results Pending at Discharge (will require follow up):   none     Outpatient Tests Requested:  inr    Complications:  one    Reason for Admission: Right sided rib vein    Hospital Course:   Danilo Padilla Jr. is a 69 y.o. male patient with past medical history of prior DVT 20 years ago with IVC filter in place, hypertension who originally presented to the hospital on 9/5/2024 due to right-sided rib pain.  CT chest showed segmental and subsegmental emboli in the right middle lobe and both lower lobes with mild clot burden.  No evidence of strain on imaging.  Doppler ultrasound was negative for DVT.  Echo showed LVEF 56% with diastolic dysfunction 1  "normal size and function of the right ventricle.  Patient was started on warfarin bridged with Lovenox.  Uneventful hospitalization, INR slowly is increasing.  Patient discharged home on Lovenox injections twice a day along with warfarin, continue until INR is greater than 2 afterwards continue with warfarin only.  Follow-up with PCP.      Please see above list of diagnoses and related plan for additional information.     Condition at Discharge: good    Discharge Day Visit / Exam:   Subjective: Has no symptoms currently  Vitals: Blood Pressure: 132/74 (09/09/24 0705)  Pulse: 81 (09/09/24 0705)  Temperature: 97.9 °F (36.6 °C) (09/09/24 0705)  Temp Source: Oral (09/08/24 1434)  Respirations: 21 (09/09/24 0705)  Height: 5' 7\" (170.2 cm) (09/06/24 1724)  Weight - Scale: 96.2 kg (212 lb 1.3 oz) (09/06/24 1724)  SpO2: 92 % (09/09/24 0705)  Exam:   Physical Exam  Vitals and nursing note reviewed.   Constitutional:       General: He is not in acute distress.     Appearance: He is not diaphoretic.   HENT:      Head: Normocephalic.   Eyes:      General: No scleral icterus.        Right eye: No discharge.         Left eye: No discharge.   Cardiovascular:      Rate and Rhythm: Normal rate and regular rhythm.      Heart sounds: No murmur heard.  Pulmonary:      Effort: Pulmonary effort is normal. No respiratory distress.      Breath sounds: Normal breath sounds. No wheezing, rhonchi or rales.   Abdominal:      General: There is no distension.      Palpations: Abdomen is soft.      Tenderness: There is no abdominal tenderness. There is no guarding or rebound.   Musculoskeletal:      Cervical back: Normal range of motion.      Right lower leg: No edema.      Left lower leg: No edema.   Skin:     General: Skin is warm.   Neurological:      Mental Status: He is alert and oriented to person, place, and time.   Psychiatric:         Mood and Affect: Mood normal.         Behavior: Behavior normal.         Thought Content: Thought " content normal.         Judgment: Judgment normal.          Discussion with Family: Patient declined call to .     Discharge instructions/Information to patient and family:   See after visit summary for information provided to patient and family.      Provisions for Follow-Up Care:  See after visit summary for information related to follow-up care and any pertinent home health orders.      Mobility at time of Discharge:   Basic Mobility Inpatient Raw Score: 24  JH-HLM Goal: 8: Walk 250 feet or more  JH-HLM Achieved: 8: Walk 250 feet ot more  HLM Goal achieved. Continue to encourage appropriate mobility.     Disposition:   Home    Planned Readmission: no     Discharge Statement:  I spent 40 minutes discharging the patient. This time was spent on the day of discharge. I had direct contact with the patient on the day of discharge. Greater than 50% of the total time was spent examining patient, answering all patient questions, arranging and discussing plan of care with patient as well as directly providing post-discharge instructions.  Additional time then spent on discharge activities.    Discharge Medications:  See after visit summary for reconciled discharge medications provided to patient and/or family.      **Please Note: This note may have been constructed using a voice recognition system**

## 2024-09-10 NOTE — UTILIZATION REVIEW
NOTIFICATION OF ADMISSION DISCHARGE   This is a Notification of Discharge from [facility]. Please be advised that this patient has been discharge from our facility. Below you will find the admission and discharge date and time including the patient’s disposition.   UTILIZATION REVIEW CONTACT:  Kinsey Marcos  Utilization   Network Utilization Review Department  Phone: [phone] x carefully listen to the prompts. All voicemails are confidential.  Email: [email or web address]     ADMISSION INFORMATION  PRESENTATION DATE: 9/5/2024 11:41 AM  OBERVATION ADMISSION DATE: 09/05/2024 1951  INPATIENT ADMISSION DATE: 9/7/24  3:42 PM   DISCHARGE DATE: 9/9/2024  1:22 PM   DISPOSITION:Home/Self Care    Network Utilization Review Department  ATTENTION: Please call with any questions or concerns to [phone] and carefully listen to the prompts so that you are directed to the right person. All voicemails are confidential.   For Discharge needs, contact Care Management DC Support Team at [phone] opt. 2  Send all requests for admission clinical reviews, approved or denied determinations and any other requests to dedicated fax number below belonging to the campus where the patient is receiving treatment. List of dedicated fax numbers for the Facilities:  FACILITY NAME UR FAX NUMBER   ADMISSION DENIALS (Administrative/Medical Necessity) [phone]   DISCHARGE SUPPORT TEAM ([facility]) [phone]   PARENT CHILD HEALTH (Maternity/NICU/Pediatrics) [phone]   [facility] [phone]   [facility] [phone]   [facility] [phone]   [facility] [phone]   [facility] [phone]   [facility] [phone]   [facility] [phone]   [facility]  [phone]   [facility] [phone]   [facility] [phone]   [facility] [phone]   [facility] [phone]

## 2024-09-10 NOTE — UTILIZATION REVIEW
NOTIFICATION OF ADMISSION DISCHARGE   This is a Notification of Discharge from [facility]. Please be advised that this patient has been discharge from our facility. Below you will find the admission and discharge date and time including the patient’s disposition.   UTILIZATION REVIEW CONTACT:  Kinsey Marcos  Utilization   Network Utilization Review Department  Phone: [phone] x carefully listen to the prompts. All voicemails are confidential.  Email: [email or web address]     ADMISSION INFORMATION  PRESENTATION DATE: 9/5/2024 11:41 AM  OBERVATION ADMISSION DATE: 09/05/2024 1951  INPATIENT ADMISSION DATE: 9/7/24  3:42 PM   DISCHARGE DATE: 9/9/2024  1:22 PM   DISPOSITION:Home/Self Care    Network Utilization Review Department  ATTENTION: Please call with any questions or concerns to [phone] and carefully listen to the prompts so that you are directed to the right person. All voicemails are confidential.   For Discharge needs, contact Care Management DC Support Team at [phone] opt. 2  Send all requests for admission clinical reviews, approved or denied determinations and any other requests to dedicated fax number below belonging to the campus where the patient is receiving treatment. List of dedicated fax numbers for the Facilities:  FACILITY NAME UR FAX NUMBER   ADMISSION DENIALS (Administrative/Medical Necessity) [phone]   DISCHARGE SUPPORT TEAM ([facility]) [phone]   PARENT CHILD HEALTH (Maternity/NICU/Pediatrics) [phone]   [facility] [phone]   [facility] [phone]   [facility] [phone]   [facility] [phone]   [facility] [phone]   [facility] [phone]   [facility] [phone]   [facility]  [phone]   [facility] [phone]   [facility] [phone]   [facility] [phone]   [facility] [phone]

## 2024-09-11 ENCOUNTER — APPOINTMENT (OUTPATIENT)
Dept: LAB | Facility: HOSPITAL | Age: 69
End: 2024-09-11
Payer: COMMERCIAL

## 2024-09-11 DIAGNOSIS — I26.99 BILATERAL PULMONARY EMBOLISM (HCC): ICD-10-CM

## 2024-09-11 PROCEDURE — 36415 COLL VENOUS BLD VENIPUNCTURE: CPT

## 2024-09-11 RX ORDER — WARFARIN SODIUM 6 MG/1
6 TABLET ORAL
Qty: 30 TABLET | Refills: 0 | Status: SHIPPED | OUTPATIENT
Start: 2024-09-11 | End: 2024-10-11

## 2024-09-11 NOTE — QUICK NOTE
Patient INR 1.8.  Called the patient and advised him to continue Lovenox injections also will increase warfarin to 6 mg daily.  Have another INR check in 3 days.

## 2024-09-12 DIAGNOSIS — I10 HYPERTENSION, UNSPECIFIED TYPE: ICD-10-CM

## 2024-09-12 DIAGNOSIS — I10 ESSENTIAL (PRIMARY) HYPERTENSION: ICD-10-CM

## 2024-09-12 RX ORDER — LOSARTAN POTASSIUM 50 MG/1
TABLET ORAL
Qty: 90 TABLET | Refills: 1 | Status: SHIPPED | OUTPATIENT
Start: 2024-09-12

## 2024-09-14 ENCOUNTER — APPOINTMENT (OUTPATIENT)
Dept: LAB | Facility: HOSPITAL | Age: 69
End: 2024-09-14
Payer: COMMERCIAL

## 2024-09-14 DIAGNOSIS — I26.99 BILATERAL PULMONARY EMBOLISM (HCC): ICD-10-CM

## 2024-09-14 LAB
INR PPP: 1.99 (ref 0.85–1.19)
PROTHROMBIN TIME: 23 SECONDS (ref 12.3–15)

## 2024-09-14 PROCEDURE — 85610 PROTHROMBIN TIME: CPT

## 2024-09-14 PROCEDURE — 36415 COLL VENOUS BLD VENIPUNCTURE: CPT

## 2024-10-08 ENCOUNTER — TELEPHONE (OUTPATIENT)
Dept: HEMATOLOGY ONCOLOGY | Facility: CLINIC | Age: 69
End: 2024-10-08

## 2024-10-08 NOTE — TELEPHONE ENCOUNTER
Patient returned calls from our office. He stated his PCP is currently managing his INR and Coumadin. He is not sure why we received the request and thinks it may have been automated.He wanted to know the time of his 11/1 appointment and I confirmed that with him, as well as the address of the [place] Office. He expressed no other needs at this time, and was appreciative.

## 2024-10-08 NOTE — TELEPHONE ENCOUNTER
"Received refill request for patient coumadin. Pt has not been seen by anyone within hematology oncology office. Pt must follow up with PCP for additonal refills and management of coumadin until seen by our office.     Per discharge summary: \"Follow-up with family doctor for further management of INR and dosage of Coumadin\"     Pt's PCP will need to refill until established with our office. Please notify patient.   "

## 2024-10-08 NOTE — TELEPHONE ENCOUNTER
Attempted to call patient to review recommendations, but was unable to speak with him. Left a voice message for him to call the [facility].

## 2024-10-08 NOTE — TELEPHONE ENCOUNTER
Second attempt to reach the patient re: his coumadin refill; L/M on voicemail for him to call the [facility] along with the phone number.

## 2024-11-01 ENCOUNTER — CONSULT (OUTPATIENT)
Dept: HEMATOLOGY ONCOLOGY | Facility: CLINIC | Age: 69
End: 2024-11-01
Payer: COMMERCIAL

## 2024-11-01 ENCOUNTER — HOSPITAL ENCOUNTER (OUTPATIENT)
Dept: CT IMAGING | Facility: HOSPITAL | Age: 69
Discharge: HOME | End: 2024-11-01
Payer: COMMERCIAL

## 2024-11-01 VITALS
WEIGHT: 222.5 LBS | DIASTOLIC BLOOD PRESSURE: 72 MMHG | HEIGHT: 67 IN | SYSTOLIC BLOOD PRESSURE: 120 MMHG | OXYGEN SATURATION: 96 % | BODY MASS INDEX: 34.92 KG/M2 | HEART RATE: 74 BPM

## 2024-11-01 DIAGNOSIS — I26.99 BILATERAL PULMONARY EMBOLISM (HCC): ICD-10-CM

## 2024-11-01 DIAGNOSIS — I26.99 OTHER ACUTE PULMONARY EMBOLISM WITHOUT ACUTE COR PULMONALE (HCC): Primary | ICD-10-CM

## 2024-11-01 PROCEDURE — 71275 CT ANGIOGRAPHY CHEST: CPT

## 2024-11-01 PROCEDURE — 99203 OFFICE O/P NEW LOW 30 MIN: CPT | Performed by: PHYSICIAN ASSISTANT

## 2024-11-01 RX ADMIN — IOHEXOL 85 ML: 350 INJECTION, SOLUTION INTRAVENOUS at 14:14

## 2024-11-01 NOTE — PROGRESS NOTES
Oncology Outpatient Consult Note  Danilo Padilla Jr. 69 y.o. 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  Lower extremity Doppler was not performed.  Patient did not have any lower extremity symptoms.  Upper extremity Doppler negative for clot.  He had a provoked clot 20 years ago after back surgery.  He was treated with Coumadin and IVC filter was placed.  This is still in place.    No evidence of malignancy on CT chest/abdomen/pelvis imaging.  No change in his health status.    He does give history of occasional heart fluttering with sensation up into his neck.  He has not been diagnosed with any official cardiac arrhythmia.    He does not take testosterone or any herbal medications.    He is on Coumadin, currently managed by his PCP.    Reviewed assessing for underlying acquired thrombophilia.  We discussed that regardless of results, given that this was an unprovoked event, indefinite anticoagulation could be considered.  Given his low use of equipment such as chainsaws, he wishes to off of anticoagulation.  He will have acquired thrombophilia testing and will reconvene for discussion.    Due to proximity to his house, he wishes to f/u in [facility].    Patient is scheduled for cataract surgery in December.  He does not need to hold his anticoagulation.  He was told, however, that the clots need to at least be stable.  Therefore, CTA scan of his chest is requested for re-evaluation.        HPI:  Danilo Padilla Jr. ' Samy' is a 69 y.o. seen for initial consultation 11/1/2024 at the referral of Gayle Gordon MD regarding  PE.    PMH hypertension, GERD, bursitis, hyperlipidemia, thoracic aortic aneurysm, IVC filter insertion.  He does have cochlear implant due to decreased hearing related to Ménière's disease.  He is a never smoker.  Does not drink alcohol.  Does not take caffeine secondary to vertigo.  He was not on  aspirin with any regularity prior to his PE.    20 years ago he had a back injury with disc herniation.  His mobility was seriously compromised preoperatively.  Postoperatively he does not believe he was given blood thinners.  Within a few weeks of discharge he experienced chest pain.  He was found to have a PE.  He was treated with Coumadin and IVC filter was placed.    (On 11/8/21 CT Abd at [facility]- “An IVC filter with fractured component, and other metallic material associated with IVC, unchanged in appearance and configuration when compared to prior examinations.)”    No family history of clot.  He does have a brother who in his 50s was diagnosed with a PFO.  His presentation was that of dizzy spells and arm pain.    In the weeks prior to his clot he was very active cleaning out his parents house.  He did have an episode of left neck pain.  1 month prior he tripped onto his right side but did not have any noticeable injury.    He maintains a very active lifestyle.  He continues to be employed.  Splits wood at his home regularly.    No definitive diagnosis of any cardiac arrhythmia.  Patient states that occasionally (a few times per year) he will feel a fluttering in his heart and in his neck.  This is self-limited.  His cardiologist did not feel that the symptoms warranted Holter monitoring due to infrequency.  Patient denies being aware of any episodes in the few weeks prior to his PE diagnosis.    9/5/24 presented to ED with 2 day history of right-sided rib pain and left-sided shoulder pain.  Denied any URI or known Covid.    CTA chest reveals Segmental and subsegmental emboli in the right middle lobe and both lower lobes with mild clot burden.  No evidence of strain on imaging.  No evidence of malignancy on CT C/A/P.    Venous duplex upper extremity- negative for DVT.  Patient reports he did not have any upper extremity pain, swelling or injury.  He did not have any lower extremity pain or injury.  When  questioned why he had the upper extremity Doppler, he said he states that he was told that because he had the IVC filter in place, they wanted to assess that he had no upper extremity clot.    9/6/24  Echo -EF 56%, grade 1 diastolic dysfunction.  No evidence of PFO.    He has not experienced any dizziness.    Copay for eliquis and xarelto- too high, so patient was bridged with Lovenox/ Coumadin        Review of Systems   Constitutional:  Negative for appetite change, chills, diaphoresis, fatigue, fever and unexpected weight change.   HENT:   Negative for mouth sores, nosebleeds, sore throat, tinnitus and voice change.    Eyes:  Negative for eye problems.   Respiratory:  Negative for chest tightness, cough, shortness of breath and wheezing.    Cardiovascular:  Negative for chest pain, leg swelling and palpitations.   Gastrointestinal:  Negative for abdominal distention, abdominal pain, blood in stool, constipation, diarrhea, nausea, rectal pain and vomiting.   Endocrine: Negative for hot flashes.   Genitourinary: Negative.     Musculoskeletal:  Positive for arthralgias (chronic B shoulder pain). Negative for gait problem and myalgias.   Skin:  Negative for itching and rash.   Neurological:  Negative for dizziness, gait problem, headaches, light-headedness and numbness.   Hematological:  Negative for adenopathy.   Psychiatric/Behavioral:  Negative for confusion and sleep disturbance. The patient is not nervous/anxious.         Test Results:      Labs:   Lab Results   Component Value Date    HGB 13.9 09/09/2024    HCT 40.9 09/09/2024    MCV 92 09/09/2024     09/09/2024    WBC 6.36 09/09/2024    NRBC 0 09/09/2024     Lab Results   Component Value Date     10/07/2015    K 3.4 (L) 09/09/2024     09/09/2024    CO2 27 09/09/2024    ANIONGAP 9 10/07/2015    BUN 20 09/09/2024    CREATININE 0.94 09/09/2024    GLUCOSE 115 07/11/2018    CALCIUM 8.8 09/09/2024    AST 14 09/05/2024    ALT 16 09/05/2024     ALKPHOS 72 09/05/2024    PROT 7.3 10/07/2015    BILITOT 0.67 10/07/2015    EGFR 82 09/09/2024           Imaging: .No results found.          Active Problems:   Patient Active Problem List   Diagnosis    Pes anserinus bursitis of left knee    Bursitis of left knee    Shoulder impingement syndrome, right    Nontraumatic complete tear of left rotator cuff    GERD (gastroesophageal reflux disease)    Hyperlipidemia    Hypertension    Viral infection    Contusion of rib on right side    Kidney stones    Acute pulmonary embolism without acute cor pulmonale (HCC)    Hypokalemia       Past Medical History:   Past Medical History:   Diagnosis Date    GERD (gastroesophageal reflux disease)     Hyperlipidemia     Hypertension     Kidney stone     Meniere disease     Pulmonary embolism (HCC)     Thoracic aortic aneurysm (HCC)        Surgical History:   Past Surgical History:   Procedure Laterality Date    ABDOMINAL SURGERY      BACK SURGERY      COCHLEAR IMPLANT Right     COLONOSCOPY      CT NEEDLE BIOPSY MEDIASTINUM  5/17/2019    FL INJECTION LEFT SHOULDER (ARTHROGRAM)  01/19/2022    HERNIA REPAIR      IVC FILTER INSERTION         Family History:    Family History   Problem Relation Age of Onset    Hypertension Mother     Hyperlipidemia Mother     Hypertension Father     Hyperlipidemia Father        Cancer-related family history is not on file.    Social History:   Social History     Socioeconomic History    Marital status:      Spouse name: Not on file    Number of children: Not on file    Years of education: Not on file    Highest education level: Not on file   Occupational History    Not on file   Tobacco Use    Smoking status: Never     Passive exposure: Never    Smokeless tobacco: Never   Vaping Use    Vaping status: Never Used   Substance and Sexual Activity    Alcohol use: Never    Drug use: No    Sexual activity: Not on file   Other Topics Concern    Not on file   Social History Narrative    Not on file      Social Determinants of Health     Financial Resource Strain: Not At Risk (1/24/2024)    [facility]    Financial Resource Strain     In the last 12 months did you skip medications to save money?: No     In the last 12 months, was there a time when you needed to see a doctor but could not because of cost?: No   Food Insecurity: No Food Insecurity (9/6/2024)    Nursing - Inadequate Food Risk Classification     Worried About Running Out of Food in the Last Year: Never true     Ran Out of Food in the Last Year: Never true     Ran Out of Food in the Last Year: Not on file   Transportation Needs: No Transportation Needs (9/6/2024)    PRAPARE - Transportation     Lack of Transportation (Medical): No     Lack of Transportation (Non-Medical): No   Physical Activity: Inactive (2/25/2020)    [facility], [facility]    Exercise Vital Sign     Days of Exercise per Week: 0 days     Minutes of Exercise per Session: 0 min   Stress: No Stress Concern Present (2/25/2020)    Received from [facility], [facility]    [employer] - Occupational Stress Questionnaire     Feeling of Stress : Not at all   Social Connections: Not At Risk (1/24/2024)    [facility] from [facility], [facility]    Social Connections     Do you often feel lonely?: No   Intimate Partner Violence: Not on file   Housing Stability: Low Risk  (9/6/2024)    Housing Stability Vital Sign     Unable to Pay for Housing in the Last Year: No     Number of Times Moved in the Last Year: 0     Homeless in the Last Year: No       Current Medications:   Current Outpatient Medications   Medication Sig Dispense Refill    chlorthalidone 25 mg tablet Take 0.5 tablets (12.5 mg total) by mouth  "daily 30 tablet 0    enoxaparin (LOVENOX) 100 mg/mL Inject 1 mL (100 mg total) under the skin every 12 (twelve) hours for 5 days 10 mL 0    ezetimibe (ZETIA) 10 mg tablet Take 10 mg by mouth every other day (Patient not taking: Reported on 7/12/2024)      losartan (COZAAR) 50 mg tablet TAKE 1 TABLET BY MOUTH EVERY DAY 90 tablet 1    magnesium (MAGTAB) 84 MG (7MEQ) TBCR Take 84 mg by mouth daily      Multiple Vitamin (multivitamin) tablet Take 1 tablet by mouth daily      Nutritional Supplements (THERALITH XR) TABS Take by mouth 2 (two) times a day 2 tablets BID (Patient not taking: Reported on 7/12/2024)      Omega-3 Fatty Acids (Fish Oil) 300 MG CAPS Take 2 g by mouth daily (Patient not taking: Reported on 9/5/2024)      omeprazole (PriLOSEC) 20 mg delayed release capsule Take 20 mg by mouth daily      potassium chloride (Klor-Con M20) 20 mEq tablet Take 1 tablet (20 mEq total) by mouth daily 180 tablet 0    warfarin (Jantoven) 6 mg tablet Take 1 tablet (6 mg total) by mouth daily 30 tablet 0     No current facility-administered medications for this visit.       Allergies:   Allergies   Allergen Reactions    Moxifloxacin Shortness Of Breath and Hives    Alcohol - Food Allergy      vertigo    Caffeine - Food Allergy Other (See Comments)     vertigo    Cephalosporins      hyperventilation    Doxycycline     Penicillins Hives    Pravastatin      Other reaction(s): Respiratory Distress    Prednisone Tinnitus     Other reaction(s): Unknown    Tamiflu [Oseltamivir] Lightheadedness     Hyperventilation    Claritin [Loratadine] Anxiety         Physical Exam:    There is no height or weight on file to calculate BSA.   Ht Readings from Last 3 Encounters:   09/06/24 5' 7\" (1.702 m)   09/05/24 5' 7\" (1.702 m)   08/22/24 5' 7\" (1.702 m)       Wt Readings from Last 3 Encounters:   09/06/24 96.2 kg (212 lb 1.3 oz)   09/05/24 98 kg (216 lb)   08/22/24 98 kg (216 lb)        Temp Readings from Last 3 Encounters:   09/09/24 97.9 °F " (36.6 °C)   09/05/24 98.8 °F (37.1 °C)   07/12/24 99.3 °F (37.4 °C)        BP Readings from Last 3 Encounters:   09/09/24 132/74   09/05/24 130/78   08/22/24 110/64         Pulse Readings from Last 3 Encounters:   09/09/24 81   09/05/24 87   08/22/24 78          Physical Exam    Physical Exam  Vitals reviewed.   Constitutional:       General: He is not in acute distress.     Appearance: He is well-developed. He is not diaphoretic.   HENT:      Head: Normocephalic and atraumatic.   Eyes:      Conjunctiva/sclera: Conjunctivae normal.   Neck:      Trachea: No tracheal deviation.   Cardiovascular:      Rate and Rhythm: Normal rate and regular rhythm.      Heart sounds: No murmur heard.     No friction rub. No gallop.   Pulmonary:      Effort: Pulmonary effort is normal. No respiratory distress.      Breath sounds: Normal breath sounds. No wheezing or rales.   Chest:      Chest wall: No tenderness.   Abdominal:      General: There is no distension.      Palpations: Abdomen is soft. There is no mass.      Tenderness: There is no abdominal tenderness.   Musculoskeletal:      Cervical back: Normal range of motion and neck supple.   Lymphadenopathy:      Cervical: No cervical adenopathy.   Skin:     General: Skin is warm and dry.      Coloration: Skin is not pale.      Findings: No erythema.   Neurological:      Mental Status: He is alert. Mental status is at baseline.   Psychiatric:         Behavior: Behavior normal.         Thought Content: Thought content normal.             Emergency Contacts:    Extended Emergency Contact Information  Primary Emergency Contact: Clay Padilla   [facility]  Home Phone: [phone]  Relation: Brother

## 2024-11-07 LAB
CARDIOLIPIN IGA SER IA-ACNC: <9 APL U/ML (ref 0–11)
CARDIOLIPIN IGG SER IA-ACNC: 24 GPL U/ML (ref 0–14)
CARDIOLIPIN IGM SER IA-ACNC: 21 MPL U/ML (ref 0–12)
HCYS SERPL-SCNC: 10.1 UMOL/L (ref 0–17.2)

## 2024-11-08 LAB
APTT HEX PL PPP: 5 SEC (ref 0–11)
APTT SCREEN TO CONFIRM RATIO: 0.98 RATIO (ref 0–1.34)
APTT-LA IMM 4:1 NP PPP: 44.6 SEC (ref 0–40.5)
AT III ACT/NOR PPP CHRO: 107 % (ref 75–135)
B2 GLYCOPROT1 IGA SER-ACNC: <9 GPI IGA UNITS (ref 0–25)
B2 GLYCOPROT1 IGG SER-ACNC: <9 GPI IGG UNITS (ref 0–20)
B2 GLYCOPROT1 IGM SER-ACNC: <9 GPI IGM UNITS (ref 0–32)
CONFIRM APTT/NORMAL: 74 SEC (ref 0–47.6)
DRVVT IMM 1:2 NP PPP: 39.6 SEC (ref 0–40.4)
FACT XIIIA PPP-ACNC: 170 % (ref 56–140)
LA PPP-IMP: ABNORMAL
SCREEN APTT: 48.6 SEC (ref 0–43.5)
SCREEN DRVVT: 53.8 SEC (ref 0–47)
THROMBIN TIME: 19 SEC (ref 0–23)

## 2024-11-20 NOTE — PROGRESS NOTES
HEMATOLOGY / ONCOLOGY CLINIC FOLLOW UP NOTE    Patient Danilo Padilla Jr.  MRN: [id number]  : 1955  Date of Encounter 2024    MALOU Marrero    Assessment/Plan      69 year old with prior provoked clot 20 years ago s/p laminectomy treated with coumadin x 6 months, now with unprovoked PE 2024, on coumadin as cannot afford DOAC here for follow up/transfer care    Recurrent PE:    Unprovoked PE diagnosed 2024.  Lower extremity Doppler was not performed.  Patient did not have any lower extremity symptoms.  Upper extremity Doppler negative for clot.    He had a provoked clot 20 years ago after back surgery.  He was treated with Coumadin and IVC filter was placed.  This is still in place.     No evidence of malignancy on CT chest/abdomen/pelvis imaging.  No change in his health status.     He does give history of occasional heart fluttering with sensation up into his neck.  He has not been diagnosed with any official cardiac arrhythmia.     He does not take testosterone or any herbal medications.     He is on Coumadin, currently managed by his PCP.     Reviewed assessing for underlying acquired thrombophilia.  We discussed that regardless of results, given that this was an unprovoked event, indefinite anticoagulation could be considered.  Given his low use of equipment such as chainsaws, he wishes to off of anticoagulation.  He will have acquired thrombophilia testing and will reconvene for discussion.     Due to proximity to his house, he wishes to f/u in [facility].     Patient is scheduled for cataract surgery in December.  He does not need to hold his anticoagulation.  He was told, however, that the clots need to at least be stable.  Therefore, CTA scan of his chest is requested for re-evaluation.       2024    Repeat CTA 2024; resolution PE     Antiphospholipid/LAC workup negative    Will remain on coumadin-currently on 7 mg every other day; PCP is  managing    Repeat labs in 3 months; will get ddimer as well    Follow up    3 months           HPI:  Danilo Padilla Jr. José Miguel Pablo' is a 69 y.o. seen for initial consultation 11/1/2024 at the referral of Gayle Gordon MD regarding  PE.     PMH hypertension, GERD, bursitis, hyperlipidemia, thoracic aortic aneurysm, IVC filter insertion.  He does have cochlear implant due to decreased hearing related to Ménière's disease.  He is a never smoker.  Does not drink alcohol.  Does not take caffeine secondary to vertigo.  He was not on aspirin with any regularity prior to his PE.     20 years ago he had a back injury with disc herniation.  His mobility was seriously compromised preoperatively.  Postoperatively he does not believe he was given blood thinners.  Within a few weeks of discharge he experienced chest pain.  He was found to have a PE.  He was treated with Coumadin and IVC filter was placed.     (On 11/8/21 CT Abd at [facility]- “An IVC filter with fractured component, and other metallic material associated with IVC, unchanged in appearance and configuration when compared to prior examinations.)”     No family history of clot.  He does have a brother who in his 50s was diagnosed with a PFO.  His presentation was that of dizzy spells and arm pain.     In the weeks prior to his clot he was very active cleaning out his parents house.  He did have an episode of left neck pain.  1 month prior he tripped onto his right side but did not have any noticeable injury.     He maintains a very active lifestyle.  He continues to be employed.  Splits wood at his home regularly.     No definitive diagnosis of any cardiac arrhythmia.  Patient states that occasionally (a few times per year) he will feel a fluttering in his heart and in his neck.  This is self-limited.  His cardiologist did not feel that the symptoms warranted Holter monitoring due to infrequency.  Patient denies being aware of any episodes in the few weeks prior  to his PE diagnosis.     9/5/24 presented to ED with 2 day history of right-sided rib pain and left-sided shoulder pain.  Denied any URI or known Covid.     CTA chest reveals Segmental and subsegmental emboli in the right middle lobe and both lower lobes with mild clot burden.  No evidence of strain on imaging.  No evidence of malignancy on CT C/A/P.     Venous duplex upper extremity- negative for DVT.  Patient reports he did not have any upper extremity pain, swelling or injury.  He did not have any lower extremity pain or injury.  When questioned why he had the upper extremity Doppler, he said he states that he was told that because he had the IVC filter in place, they wanted to assess that he had no upper extremity clot.     9/6/24  Echo -EF 56%, grade 1 diastolic dysfunction.  No evidence of PFO.    He has not experienced any dizziness.     Copay for eliquis and xarelto- too high, so patient was bridged with Lovenox/ Coumadin         Interval History: 11/22/2024    Doing well on coumadin; does not want to be on blood thinners as he does significant work with knifes/saws.  He did have LAC and antiphospholipid antibody workup negative No family history of clotting. FVLeiden/prothrombin gene not done. As this is unprovoked recurrence, will remain on coumadin lifelong.  Would prefer switch to DOAC but he is not on Medicare and his current insurance will not pay.  States he feels this PE was from trauma of working on a house at the [facility].     PCP is managing INR states he is on 7 mg coumadin every other day, last INR was 2.3 last week.          11/1/2024    FINDINGS:     PULMONARY ARTERIAL TREE: Previously seen pulmonary embolism has resolved.     LUNGS: There is residual atelectasis at the left base. There is no tracheal or endobronchial lesion.     PLEURA: Unremarkable.     HEART/GREAT VESSELS: Heart is unremarkable for patient's age. No thoracic aortic aneurysm.     MEDIASTINUM AND TORSTEN: Unremarkable.     CHEST  WALL AND LOWER NECK: There is a 9 mm nonobstructing left renal calculus.     VISUALIZED STRUCTURES IN THE UPPER ABDOMEN: Unremarkable.     OSSEOUS STRUCTURES: No acute fracture or destructive osseous lesion.     IMPRESSION:     Resolution of prior PE    REVIEW OF SYSTEMS: as above   Please note that a 14-point review of systems was performed to include Constitutional, HEENT, Respiratory, CVS, GI, , Musculoskeletal, Integumentary, Neurologic, Rheumatologic, Endocrinologic, Psychiatric, Lymphatic, and Hematologic/Oncologic systems were reviewed and are negative unless otherwise stated in HPI. Positive and negative findings pertinent to this evaluation are incorporated into the history of present illness.      ECOG PS: 0    PROBLEM LIST:  Patient Active Problem List   Diagnosis    Pes anserinus bursitis of left knee    Bursitis of left knee    Shoulder impingement syndrome, right    Nontraumatic complete tear of left rotator cuff    GERD (gastroesophageal reflux disease)    Hyperlipidemia    Hypertension    Viral infection    Contusion of rib on right side    Kidney stones    Acute pulmonary embolism without acute cor pulmonale (HCC)    Hypokalemia       Past Medical History:   has a past medical history of GERD (gastroesophageal reflux disease), Hyperlipidemia, Hypertension, Kidney stone, Meniere disease, Pulmonary embolism (HCC), and Thoracic aortic aneurysm (HCC).    PAST SURGICAL HISTORY:   has a past surgical history that includes Hernia repair; Back surgery; Cochlear implant (Right); IVC FILTER INSERTION; Abdominal surgery; FL injection left shoulder (arthrogram) (01/19/2022); Colonoscopy; and CT needle biopsy mediastinum (5/17/2019).    CURRENT MEDICATIONS  Current Outpatient Medications   Medication Sig Dispense Refill    chlorthalidone 25 mg tablet Take 0.5 tablets (12.5 mg total) by mouth daily 30 tablet 0    enoxaparin (LOVENOX) 100 mg/mL Inject 1 mL (100 mg total) under the skin every 12 (twelve) hours  for 5 days (Patient not taking: Reported on 11/1/2024) 10 mL 0    ezetimibe (ZETIA) 10 mg tablet Take 10 mg by mouth every other day (Patient not taking: Reported on 7/12/2024)      losartan (COZAAR) 50 mg tablet TAKE 1 TABLET BY MOUTH EVERY DAY 90 tablet 1    magnesium (MAGTAB) 84 MG (7MEQ) TBCR Take 84 mg by mouth daily      Multiple Vitamin (multivitamin) tablet Take 1 tablet by mouth daily      Nutritional Supplements (THERALITH XR) TABS Take by mouth 2 (two) times a day 2 tablets BID      Omega-3 Fatty Acids (Fish Oil) 300 MG CAPS Take 2 g by mouth daily (Patient not taking: Reported on 9/5/2024)      omeprazole (PriLOSEC) 20 mg delayed release capsule Take 20 mg by mouth daily      potassium chloride (Klor-Con M20) 20 mEq tablet Take 1 tablet (20 mEq total) by mouth daily 180 tablet 0    warfarin (Jantoven) 6 mg tablet Take 1 tablet (6 mg total) by mouth daily 30 tablet 0     No current facility-administered medications for this visit.     [unfilled]    SOCIAL HISTORY:   reports that he has never smoked. He has never been exposed to tobacco smoke. He has never used smokeless tobacco. He reports that he does not drink alcohol and does not use drugs.     FAMILY HISTORY:  family history includes Hyperlipidemia in his father and mother; Hypertension in his father and mother.     ALLERGIES:  is allergic to moxifloxacin, alcohol - food allergy, caffeine - food allergy, cephalosporins, doxycycline, penicillins, pravastatin, prednisone, tamiflu [oseltamivir], and claritin [loratadine].      Physical Exam:  Vital Signs:   Visit Vitals  Smoking Status Never     There is no height or weight on file to calculate BMI.  There is no height or weight on file to calculate BSA.    GEN: Alert, awake oriented x3, in no acute distress  HEENT- No pallor, icterus, cyanosis, no oral mucosal lesions,   LAD - no palpable cervical, clavicle, axillary, inguinal LAD  Heart- normal S1 S2, regular rate and rhythm, No murmur, rubs.   Lungs-  clear breathing sound bilateral.   Abdomen- soft, Non tender, bowel sounds present  Extremities- No cyanosis, clubbing, edema  Neuro- No focal neurological deficit    Labs:  Lab Results   Component Value Date    WBC 6.36 09/09/2024    HGB 13.9 09/09/2024    HCT 40.9 09/09/2024    MCV 92 09/09/2024     09/09/2024     Lab Results   Component Value Date     10/07/2015    SODIUM 136 09/09/2024    K 3.4 (L) 09/09/2024     09/09/2024    CO2 27 09/09/2024    ANIONGAP 9 10/07/2015    AGAP 7 09/09/2024    BUN 20 09/09/2024    CREATININE 0.94 09/09/2024    GLUC 126 09/09/2024    CALCIUM 8.8 09/09/2024    AST 14 09/05/2024    ALT 16 09/05/2024    ALKPHOS 72 09/05/2024    PROT 7.3 10/07/2015    TP 7.3 09/05/2024    BILITOT 0.67 10/07/2015    TBILI 1.36 (H) 09/05/2024    EGFR 82 09/09/2024           I spent 30 minutes on chart review, face to face counseling time, coordination of care and documentation.    Yanique Aguilar MD PhD

## 2024-11-22 ENCOUNTER — OFFICE VISIT (OUTPATIENT)
Age: 69
End: 2024-11-22
Payer: COMMERCIAL

## 2024-11-22 VITALS
OXYGEN SATURATION: 94 % | RESPIRATION RATE: 16 BRPM | SYSTOLIC BLOOD PRESSURE: 130 MMHG | WEIGHT: 222 LBS | TEMPERATURE: 97.8 F | HEART RATE: 91 BPM | HEIGHT: 67 IN | BODY MASS INDEX: 34.84 KG/M2 | DIASTOLIC BLOOD PRESSURE: 82 MMHG

## 2024-11-22 DIAGNOSIS — I26.99 BILATERAL PULMONARY EMBOLISM (HCC): Primary | ICD-10-CM

## 2024-11-22 PROCEDURE — 99214 OFFICE O/P EST MOD 30 MIN: CPT | Performed by: INTERNAL MEDICINE

## 2024-11-26 ENCOUNTER — OFFICE VISIT (OUTPATIENT)
Dept: URGENT CARE | Facility: CLINIC | Age: 69
End: 2024-11-26
Payer: COMMERCIAL

## 2024-11-26 VITALS
OXYGEN SATURATION: 96 % | BODY MASS INDEX: 35 KG/M2 | RESPIRATION RATE: 18 BRPM | SYSTOLIC BLOOD PRESSURE: 138 MMHG | TEMPERATURE: 97.1 F | DIASTOLIC BLOOD PRESSURE: 70 MMHG | WEIGHT: 223 LBS | HEIGHT: 67 IN | HEART RATE: 65 BPM

## 2024-11-26 DIAGNOSIS — H00.011 HORDEOLUM EXTERNUM OF RIGHT UPPER EYELID: Primary | ICD-10-CM

## 2024-11-26 PROCEDURE — G0382 LEV 3 HOSP TYPE B ED VISIT: HCPCS | Performed by: FAMILY MEDICINE

## 2024-11-26 PROCEDURE — S9083 URGENT CARE CENTER GLOBAL: HCPCS | Performed by: FAMILY MEDICINE

## 2024-11-26 RX ORDER — ERYTHROMYCIN 5 MG/G
0.5 OINTMENT OPHTHALMIC EVERY 8 HOURS SCHEDULED
Qty: 3.5 G | Refills: 0 | Status: SHIPPED | OUTPATIENT
Start: 2024-11-26 | End: 2024-11-29

## 2024-11-26 RX ORDER — WARFARIN SODIUM 5 MG/1
TABLET ORAL
COMMUNITY
Start: 2024-11-24

## 2024-12-16 DIAGNOSIS — I10 HYPERTENSION, UNSPECIFIED TYPE: ICD-10-CM

## 2024-12-17 RX ORDER — CHLORTHALIDONE 25 MG/1
12.5 TABLET ORAL DAILY
Qty: 45 TABLET | Refills: 1 | Status: SHIPPED | OUTPATIENT
Start: 2024-12-17

## 2025-02-16 NOTE — ASSESSMENT & PLAN NOTE
HPI:  Danilo Padilla Jr. ' Samy' is a 69 y.o. seen for initial consultation 11/1/2024 at the referral of Gayle Gordon MD regarding  PE.     PMH hypertension, GERD, bursitis, hyperlipidemia, thoracic aortic aneurysm, IVC filter insertion.  He does have cochlear implant due to decreased hearing related to Ménière's disease.  He is a never smoker.  Does not drink alcohol.  Does not take caffeine secondary to vertigo.  He was not on aspirin with any regularity prior to his PE.     20 years ago he had a back injury with disc herniation.  His mobility was seriously compromised preoperatively.  Postoperatively he does not believe he was given blood thinners.  Within a few weeks of discharge he experienced chest pain.  He was found to have a PE.  He was treated with Coumadin and IVC filter was placed.     (On 11/8/21 CT Abd at [facility]- “An IVC filter with fractured component, and other metallic material associated with IVC, unchanged in appearance and configuration when compared to prior examinations.)”     No family history of clot.  He does have a brother who in his 50s was diagnosed with a PFO.  His presentation was that of dizzy spells and arm pain.     In the weeks prior to his clot he was very active cleaning out his parents house.  He did have an episode of left neck pain.  1 month prior he tripped onto his right side but did not have any noticeable injury.     He maintains a very active lifestyle.  He continues to be employed.  Splits wood at his home regularly.     No definitive diagnosis of any cardiac arrhythmia.  Patient states that occasionally (a few times per year) he will feel a fluttering in his heart and in his neck.  This is self-limited.  His cardiologist did not feel that the symptoms warranted Holter monitoring due to infrequency.  Patient denies being aware of any episodes in the few weeks prior to his PE diagnosis.     9/5/24 presented to ED with 2 day history of right-sided rib pain  and left-sided shoulder pain.  Denied any URI or known Covid.     CTA chest reveals Segmental and subsegmental emboli in the right middle lobe and both lower lobes with mild clot burden.  No evidence of strain on imaging.  No evidence of malignancy on CT C/A/P.     Venous duplex upper extremity- negative for DVT.  Patient reports he did not have any upper extremity pain, swelling or injury.  He did not have any lower extremity pain or injury.  When questioned why he had the upper extremity Doppler, he said he states that he was told that because he had the IVC filter in place, they wanted to assess that he had no upper extremity clot.     9/6/24  Echo -EF 56%, grade 1 diastolic dysfunction.  No evidence of PFO.    He has not experienced any dizziness.     Copay for eliquis and xarelto- too high, so patient was bridged with Lovenox/ Coumadin         Interval History: 11/22/2024     Doing well on coumadin; does not want to be on blood thinners as he does significant work with knifes/saws.  He did have LAC and antiphospholipid antibody workup negative No family history of clotting. FVLeiden/prothrombin gene not done. As this is unprovoked recurrence, will remain on coumadin lifelong.  Would prefer switch to DOAC but he is not on Medicare and his current insurance will not pay.  States he feels this PE was from trauma of working on a house at the [facility].      PCP is managing INR states he is on 7 mg coumadin every other day, last INR was 2.3 last week.             11/1/2024     FINDINGS:     PULMONARY ARTERIAL TREE: Previously seen pulmonary embolism has resolved.     LUNGS: There is residual atelectasis at the left base. There is no tracheal or endobronchial lesion.     PLEURA: Unremarkable.     HEART/GREAT VESSELS: Heart is unremarkable for patient's age. No thoracic aortic aneurysm.     MEDIASTINUM AND TORSTEN: Unremarkable.     CHEST WALL AND LOWER NECK: There is a 9 mm nonobstructing left renal calculus.     VISUALIZED  STRUCTURES IN THE UPPER ABDOMEN: Unremarkable.     OSSEOUS STRUCTURES: No acute fracture or destructive osseous lesion.     IMPRESSION:     Resolution of prior PE

## 2025-02-16 NOTE — PROGRESS NOTES
Name: Danilo Padilla Jr.      : 1955      MRN: [id number]  Encounter Provider: Yanique Aguilar MD  Encounter Date: 2025   Encounter department: [facility]  :  Assessment & Plan  Bilateral pulmonary embolism (HCC)      Unprovoked PE diagnosed 2024.  Lower extremity Doppler was not performed.  Patient did not have any lower extremity symptoms.  Upper extremity Doppler negative for clot.     He had a provoked clot 20 years ago after back surgery.  He was treated with Coumadin and IVC filter was placed.  This is still in place.     No evidence of malignancy on CT chest/abdomen/pelvis imaging.  No change in his health status.     He does give history of occasional heart fluttering with sensation up into his neck.  He has not been diagnosed with any official cardiac arrhythmia.     He does not take testosterone or any herbal medications.     He is on Coumadin, currently managed by his PCP at [facility] .     Reviewed assessing for underlying acquired thrombophilia.  We discussed that regardless of results, given that this was an unprovoked event, indefinite anticoagulation could be considered.  Given his low use of equipment such as chainsaws, he wishes to off of anticoagulation.  He will have acquired thrombophilia testing and will reconvene for discussion.     Due to proximity to his house, he wishes to f/u in [facility].     Patient is scheduled for cataract surgery in December.  He does not need to hold his anticoagulation.  He was told, however, that the clots need to at least be stable.  Therefore, CTA scan of his chest is requested for re-evaluation.    Most recent INR was 3.7; now alternating 2.5 mg alternating with 5 mg; will repeat PT/INR next week    PCP has been managing and will continue to do so; given option of returning here only as needed as will remain on life long AC      Summary/Recommendations    Recurrent clot; on  coumadin, will remain on coumadin indefinately    PCP following; will continue with PCP    If any issues, will return; otherwise PCP will monitor    Follow up as needed       History of Present Illness   No chief complaint on file.    HPI:  Danilo Pablo' is a 69 y.o. seen for initial consultation 11/1/2024 at the referral of Gayle Gordon MD regarding  PE.     PMH hypertension, GERD, bursitis, hyperlipidemia, thoracic aortic aneurysm, IVC filter insertion.  He does have cochlear implant due to decreased hearing related to Ménière's disease.  He is a never smoker.  Does not drink alcohol.  Does not take caffeine secondary to vertigo.  He was not on aspirin with any regularity prior to his PE.     20 years ago he had a back injury with disc herniation.  His mobility was seriously compromised preoperatively.  Postoperatively he does not believe he was given blood thinners.  Within a few weeks of discharge he experienced chest pain.  He was found to have a PE.  He was treated with Coumadin and IVC filter was placed.     (On 11/8/21 CT Abd at [facility]- “An IVC filter with fractured component, and other metallic material associated with IVC, unchanged in appearance and configuration when compared to prior examinations.)”     No family history of clot.  He does have a brother who in his 50s was diagnosed with a PFO.  His presentation was that of dizzy spells and arm pain.     In the weeks prior to his clot he was very active cleaning out his parents house.  He did have an episode of left neck pain.  1 month prior he tripped onto his right side but did not have any noticeable injury.     He maintains a very active lifestyle.  He continues to be employed.  Splits wood at his home regularly.     No definitive diagnosis of any cardiac arrhythmia.  Patient states that occasionally (a few times per year) he will feel a fluttering in his heart and in his neck.  This is self-limited.  His cardiologist did not  feel that the symptoms warranted Holter monitoring due to infrequency.  Patient denies being aware of any episodes in the few weeks prior to his PE diagnosis.     9/5/24 presented to ED with 2 day history of right-sided rib pain and left-sided shoulder pain.  Denied any URI or known Covid.     CTA chest reveals Segmental and subsegmental emboli in the right middle lobe and both lower lobes with mild clot burden.  No evidence of strain on imaging.  No evidence of malignancy on CT C/A/P.     Venous duplex upper extremity- negative for DVT.  Patient reports he did not have any upper extremity pain, swelling or injury.  He did not have any lower extremity pain or injury.  When questioned why he had the upper extremity Doppler, he said he states that he was told that because he had the IVC filter in place, they wanted to assess that he had no upper extremity clot.     9/6/24  Echo -EF 56%, grade 1 diastolic dysfunction.  No evidence of PFO.    He has not experienced any dizziness.     Copay for eliquis and xarelto- too high, so patient was bridged with Lovenox/ Coumadin           Pertinent Medical History   02/15/25:     No issues 2/19/2025        Review of Systems   All other systems reviewed and are negative.          Objective   There were no vitals taken for this visit.    Pain Screening:     ECOG   0  Physical Exam  Vitals reviewed.   HENT:      Head: Normocephalic.      Nose: Nose normal.      Mouth/Throat:      Mouth: Mucous membranes are moist.   Eyes:      Pupils: Pupils are equal, round, and reactive to light.   Cardiovascular:      Rate and Rhythm: Normal rate.      Pulses: Normal pulses.   Pulmonary:      Effort: Pulmonary effort is normal.   Abdominal:      General: Bowel sounds are normal.   Musculoskeletal:         General: Normal range of motion.      Cervical back: Normal range of motion.   Skin:     General: Skin is warm.   Neurological:      General: No focal deficit present.      Mental Status: He is  alert.   Psychiatric:         Mood and Affect: Mood normal.         Labs: I have reviewed the following labs:  Lab Results   Component Value Date/Time    WBC 6.36 09/09/2024 05:08 AM    RBC 4.44 09/09/2024 05:08 AM    Hemoglobin 13.9 09/09/2024 05:08 AM    Hematocrit 40.9 09/09/2024 05:08 AM    MCV 92 09/09/2024 05:08 AM    MCH 31.3 09/09/2024 05:08 AM    RDW 13.2 09/09/2024 05:08 AM    Platelets 223 09/09/2024 05:08 AM    Segmented % 60 09/09/2024 05:08 AM    Lymphocytes % 25 09/09/2024 05:08 AM    Monocytes % 10 09/09/2024 05:08 AM    Eosinophils Relative 4 09/09/2024 05:08 AM    Basophils Relative 1 09/09/2024 05:08 AM    Immature Grans % 0 09/09/2024 05:08 AM    Absolute Neutrophils 3.88 09/09/2024 05:08 AM     Lab Results   Component Value Date/Time    Potassium 3.4 (L) 09/09/2024 05:08 AM    Chloride 102 09/09/2024 05:08 AM    CO2 27 09/09/2024 05:08 AM    BUN 20 09/09/2024 05:08 AM    Creatinine 0.94 09/09/2024 05:08 AM    Calcium 8.8 09/09/2024 05:08 AM    AST 14 09/05/2024 11:38 AM    ALT 16 09/05/2024 11:38 AM    Alkaline Phosphatase 72 09/05/2024 11:38 AM    Total Protein 7.3 09/05/2024 11:38 AM    Albumin 3.9 09/05/2024 11:38 AM    Total Bilirubin 1.36 (H) 09/05/2024 11:38 AM    eGFR 82 09/09/2024 05:08 AM             Administrative Statements   I have spent a total time of 40 minutes in caring for this patient on the day of the visit/encounter including Counseling / Coordination of care, Documenting in the medical record, Reviewing/placing orders in the medical record (including tests, medications, and/or procedures), and Obtaining or reviewing history  .

## 2025-02-18 LAB
ALBUMIN SERPL-MCNC: 4.3 G/DL (ref 3.9–4.9)
ALP SERPL-CCNC: 96 IU/L (ref 44–121)
ALT SERPL-CCNC: 32 IU/L (ref 0–44)
AST SERPL-CCNC: 23 IU/L (ref 0–40)
BASOPHILS # BLD AUTO: 0 X10E3/UL (ref 0–0.2)
BASOPHILS NFR BLD AUTO: 1 %
BILIRUB SERPL-MCNC: 0.6 MG/DL (ref 0–1.2)
BUN SERPL-MCNC: 25 MG/DL (ref 8–27)
BUN/CREAT SERPL: 24 (ref 10–24)
CALCIUM SERPL-MCNC: 9.6 MG/DL (ref 8.6–10.2)
CHLORIDE SERPL-SCNC: 103 MMOL/L (ref 96–106)
CO2 SERPL-SCNC: 25 MMOL/L (ref 20–29)
CREAT SERPL-MCNC: 1.06 MG/DL (ref 0.76–1.27)
EGFR: 76 ML/MIN/1.73
EOSINOPHIL # BLD AUTO: 0.2 X10E3/UL (ref 0–0.4)
EOSINOPHIL NFR BLD AUTO: 3 %
ERYTHROCYTE [DISTWIDTH] IN BLOOD BY AUTOMATED COUNT: 13.2 % (ref 11.6–15.4)
GLOBULIN SER-MCNC: 2.8 G/DL (ref 1.5–4.5)
GLUCOSE SERPL-MCNC: 117 MG/DL (ref 70–99)
HCT VFR BLD AUTO: 44 % (ref 37.5–51)
HGB BLD-MCNC: 15.1 G/DL (ref 13–17.7)
IMM GRANULOCYTES # BLD: 0 X10E3/UL (ref 0–0.1)
IMM GRANULOCYTES NFR BLD: 0 %
LDH SERPL-CCNC: 191 IU/L (ref 121–224)
LYMPHOCYTES # BLD AUTO: 1.5 X10E3/UL (ref 0.7–3.1)
LYMPHOCYTES NFR BLD AUTO: 26 %
MAGNESIUM SERPL-MCNC: 2 MG/DL (ref 1.6–2.3)
MCH RBC QN AUTO: 31.3 PG (ref 26.6–33)
MCHC RBC AUTO-ENTMCNC: 34.3 G/DL (ref 31.5–35.7)
MCV RBC AUTO: 91 FL (ref 79–97)
MONOCYTES # BLD AUTO: 0.5 X10E3/UL (ref 0.1–0.9)
MONOCYTES NFR BLD AUTO: 8 %
NEUTROPHILS # BLD AUTO: 3.6 X10E3/UL (ref 1.4–7)
NEUTROPHILS NFR BLD AUTO: 62 %
PLATELET # BLD AUTO: 216 X10E3/UL (ref 150–450)
POTASSIUM SERPL-SCNC: 3.7 MMOL/L (ref 3.5–5.2)
PROT SERPL-MCNC: 7.1 G/DL (ref 6–8.5)
RBC # BLD AUTO: 4.82 X10E6/UL (ref 4.14–5.8)
SODIUM SERPL-SCNC: 141 MMOL/L (ref 134–144)
WBC # BLD AUTO: 5.8 X10E3/UL (ref 3.4–10.8)

## 2025-02-19 ENCOUNTER — OFFICE VISIT (OUTPATIENT)
Age: 70
End: 2025-02-19
Payer: COMMERCIAL

## 2025-02-19 VITALS
HEIGHT: 67 IN | BODY MASS INDEX: 34.84 KG/M2 | HEART RATE: 77 BPM | TEMPERATURE: 98.2 F | DIASTOLIC BLOOD PRESSURE: 75 MMHG | SYSTOLIC BLOOD PRESSURE: 126 MMHG | WEIGHT: 222 LBS | OXYGEN SATURATION: 96 % | RESPIRATION RATE: 16 BRPM

## 2025-02-19 DIAGNOSIS — I26.99 BILATERAL PULMONARY EMBOLISM (HCC): Primary | ICD-10-CM

## 2025-02-19 DIAGNOSIS — I26.99 OTHER ACUTE PULMONARY EMBOLISM WITHOUT ACUTE COR PULMONALE (HCC): ICD-10-CM

## 2025-02-19 PROCEDURE — 99215 OFFICE O/P EST HI 40 MIN: CPT | Performed by: INTERNAL MEDICINE

## 2025-03-05 DIAGNOSIS — I10 ESSENTIAL (PRIMARY) HYPERTENSION: ICD-10-CM

## 2025-03-05 DIAGNOSIS — I10 HYPERTENSION, UNSPECIFIED TYPE: ICD-10-CM

## 2025-03-06 RX ORDER — LOSARTAN POTASSIUM 50 MG/1
50 TABLET ORAL DAILY
Qty: 90 TABLET | Refills: 1 | Status: SHIPPED | OUTPATIENT
Start: 2025-03-06

## 2025-03-06 NOTE — TELEPHONE ENCOUNTER
Patient needs an appointment. Please contact the patient to schedule an appointment. Last office visit: 08.22.2024

## 2025-03-31 ENCOUNTER — TELEPHONE (OUTPATIENT)
Age: 70
End: 2025-03-31

## 2025-04-01 DIAGNOSIS — E87.6 HYPOKALEMIA: ICD-10-CM

## 2025-04-01 RX ORDER — POTASSIUM CHLORIDE 1500 MG/1
20 TABLET, EXTENDED RELEASE ORAL DAILY
Qty: 90 TABLET | Refills: 1 | Status: SHIPPED | OUTPATIENT
Start: 2025-04-01

## 2025-04-17 ENCOUNTER — OFFICE VISIT (OUTPATIENT)
Dept: UROLOGY | Facility: MEDICAL CENTER | Age: 70
End: 2025-04-17
Payer: COMMERCIAL

## 2025-04-17 VITALS
WEIGHT: 221 LBS | DIASTOLIC BLOOD PRESSURE: 80 MMHG | SYSTOLIC BLOOD PRESSURE: 142 MMHG | BODY MASS INDEX: 34.69 KG/M2 | OXYGEN SATURATION: 96 % | HEART RATE: 80 BPM | HEIGHT: 67 IN | RESPIRATION RATE: 19 BRPM

## 2025-04-17 DIAGNOSIS — N13.8 BPH WITH URINARY OBSTRUCTION: Primary | ICD-10-CM

## 2025-04-17 DIAGNOSIS — N40.1 BPH WITH URINARY OBSTRUCTION: Primary | ICD-10-CM

## 2025-04-17 DIAGNOSIS — N20.0 KIDNEY STONES: ICD-10-CM

## 2025-04-17 LAB
SL AMB  POCT GLUCOSE, UA: NORMAL
SL AMB LEUKOCYTE ESTERASE,UA: NORMAL
SL AMB POCT BILIRUBIN,UA: NORMAL
SL AMB POCT BLOOD,UA: NORMAL
SL AMB POCT CLARITY,UA: CLEAR
SL AMB POCT COLOR,UA: YELLOW
SL AMB POCT KETONES,UA: NORMAL
SL AMB POCT NITRITE,UA: NORMAL
SL AMB POCT PH,UA: 5.5
SL AMB POCT SPECIFIC GRAVITY,UA: 1.03
SL AMB POCT URINE PROTEIN: NORMAL
SL AMB POCT UROBILINOGEN: 0.2

## 2025-04-17 PROCEDURE — 99213 OFFICE O/P EST LOW 20 MIN: CPT | Performed by: UROLOGY

## 2025-04-17 PROCEDURE — 81003 URINALYSIS AUTO W/O SCOPE: CPT | Performed by: UROLOGY

## 2025-04-17 NOTE — PROGRESS NOTES
"   HISTORY:    Follow-up for history of renal calculi.    CT of March 2024:  KIDNEYS/URETERS: Multiple bilateral renal calculi measuring up to 9 mm on the right and 10 mm on the left. No hydroureteronephrosis. 1.7 cm right renal cyst.    PSA 2.8 in September 2024 at [facility]        Minimal BPH symptoms, not bothered at all         ASSESSMENT / PLAN:    CT scan films reviewed and shown to patient.  Stones as above, nonobstructing    We discussed the option of scheduled ureteroscopy to get rid the stones.  He is comfortable just observing    Follow-up in 2 years, likely CT at that time        Review of Systems      Objective:     Physical Exam  Genitourinary:     Comments: Penis testes normal    Prostate minimally large no nodule          No results found for: \"PSA\"]  BUN   Date Value Ref Range Status   02/17/2025 25 8 - 27 mg/dL Final     Creatinine   Date Value Ref Range Status   02/17/2025 1.06 0.76 - 1.27 mg/dL Final   09/09/2024 0.94 0.60 - 1.30 mg/dL Final     Comment:     Standardized to IDMS reference method   10/07/2015 1.00 0.60 - 1.30 mg/dL Final     Comment:     Standardized to IDMS reference method     No components found for: \"CBC\"    Patient Active Problem List   Diagnosis    Pes anserinus bursitis of left knee    Bursitis of left knee    Shoulder impingement syndrome, right    Nontraumatic complete tear of left rotator cuff    GERD (gastroesophageal reflux disease)    Hyperlipidemia    Hypertension    Viral infection    Contusion of rib on right side    Kidney stones    Acute pulmonary embolism without acute cor pulmonale (HCC)    Hypokalemia    Hordeolum externum of right upper eyelid        Patient ID: Danilo Padilla . is a 69 y.o. male.      Current Outpatient Medications:     chlorthalidone 25 mg tablet, TAKE 1/2 TABLET BY MOUTH EVERY DAY, Disp: 45 tablet, Rfl: 1    ezetimibe (ZETIA) 10 mg tablet, Take 10 mg by mouth every other day, Disp: , Rfl:     losartan (COZAAR) 50 mg " tablet, TAKE 1 TABLET BY MOUTH EVERY DAY, Disp: 90 tablet, Rfl: 1    Multiple Vitamin (multivitamin) tablet, Take 1 tablet by mouth daily, Disp: , Rfl:     Nutritional Supplements (THERALITH XR) TABS, Take by mouth 2 (two) times a day 2 tablets BID, Disp: , Rfl:     omeprazole (PriLOSEC) 20 mg delayed release capsule, Take 20 mg by mouth daily, Disp: , Rfl:     potassium chloride (Klor-Con M20) 20 mEq tablet, TAKE 1 TABLET BY MOUTH EVERY DAY, Disp: 90 tablet, Rfl: 1    warfarin (COUMADIN) 5 mg tablet, , Disp: , Rfl:     enoxaparin (LOVENOX) 100 mg/mL, Inject 1 mL (100 mg total) under the skin every 12 (twelve) hours for 5 days (Patient not taking: Reported on 11/1/2024), Disp: 10 mL, Rfl: 0    magnesium (MAGTAB) 84 MG (7MEQ) TBCR, Take 84 mg by mouth daily (Patient not taking: Reported on 11/26/2024), Disp: , Rfl:     Omega-3 Fatty Acids (Fish Oil) 300 MG CAPS, Take 2 g by mouth daily (Patient not taking: Reported on 9/5/2024), Disp: , Rfl:     warfarin (Jantoven) 6 mg tablet, Take 1 tablet (6 mg total) by mouth daily (Patient not taking: Reported on 2/19/2025), Disp: 30 tablet, Rfl: 0

## 2025-06-06 ENCOUNTER — HOSPITAL ENCOUNTER (EMERGENCY)
Facility: HOSPITAL | Age: 70
Discharge: HOME | End: 2025-06-06
Attending: EMERGENCY MEDICINE
Payer: COMMERCIAL

## 2025-06-06 VITALS
RESPIRATION RATE: 18 BRPM | OXYGEN SATURATION: 94 % | WEIGHT: 218 LBS | SYSTOLIC BLOOD PRESSURE: 128 MMHG | BODY MASS INDEX: 34.21 KG/M2 | HEART RATE: 60 BPM | DIASTOLIC BLOOD PRESSURE: 63 MMHG | TEMPERATURE: 98.1 F | HEIGHT: 67 IN

## 2025-06-06 DIAGNOSIS — E87.6 ACUTE HYPOKALEMIA: ICD-10-CM

## 2025-06-06 DIAGNOSIS — R00.2 PALPITATIONS: Primary | ICD-10-CM

## 2025-06-06 LAB
2HR DELTA HS TROPONIN: -1 NG/L
ALBUMIN SERPL BCG-MCNC: 4.1 G/DL (ref 3.5–5)
ALP SERPL-CCNC: 69 U/L (ref 34–104)
ALT SERPL W P-5'-P-CCNC: 21 U/L (ref 7–52)
ANION GAP SERPL CALCULATED.3IONS-SCNC: 7 MMOL/L (ref 4–13)
AST SERPL W P-5'-P-CCNC: 19 U/L (ref 13–39)
ATRIAL RATE: 75 BPM
BASOPHILS # BLD AUTO: 0.06 THOUSANDS/ÂΜL (ref 0–0.1)
BASOPHILS NFR BLD AUTO: 1 % (ref 0–1)
BILIRUB SERPL-MCNC: 0.53 MG/DL (ref 0.2–1)
BUN SERPL-MCNC: 22 MG/DL (ref 5–25)
CALCIUM SERPL-MCNC: 9.1 MG/DL (ref 8.4–10.2)
CARDIAC TROPONIN I PNL SERPL HS: 5 NG/L (ref ?–50)
CARDIAC TROPONIN I PNL SERPL HS: 6 NG/L (ref ?–50)
CHLORIDE SERPL-SCNC: 104 MMOL/L (ref 96–108)
CO2 SERPL-SCNC: 29 MMOL/L (ref 21–32)
CREAT SERPL-MCNC: 1.06 MG/DL (ref 0.6–1.3)
EOSINOPHIL # BLD AUTO: 0.24 THOUSAND/ÂΜL (ref 0–0.61)
EOSINOPHIL NFR BLD AUTO: 3 % (ref 0–6)
ERYTHROCYTE [DISTWIDTH] IN BLOOD BY AUTOMATED COUNT: 13.9 % (ref 11.6–15.1)
GFR SERPL CREATININE-BSD FRML MDRD: 70 ML/MIN/1.73SQ M
GLUCOSE SERPL-MCNC: 104 MG/DL (ref 65–140)
HCT VFR BLD AUTO: 42.2 % (ref 36.5–49.3)
HGB BLD-MCNC: 14.6 G/DL (ref 12–17)
IMM GRANULOCYTES # BLD AUTO: 0.02 THOUSAND/UL (ref 0–0.2)
IMM GRANULOCYTES NFR BLD AUTO: 0 % (ref 0–2)
INR PPP: 1.5 (ref 0.85–1.19)
LYMPHOCYTES # BLD AUTO: 2.88 THOUSANDS/ÂΜL (ref 0.6–4.47)
LYMPHOCYTES NFR BLD AUTO: 38 % (ref 14–44)
MCH RBC QN AUTO: 31.7 PG (ref 26.8–34.3)
MCHC RBC AUTO-ENTMCNC: 34.6 G/DL (ref 31.4–37.4)
MCV RBC AUTO: 92 FL (ref 82–98)
MONOCYTES # BLD AUTO: 0.74 THOUSAND/ÂΜL (ref 0.17–1.22)
MONOCYTES NFR BLD AUTO: 10 % (ref 4–12)
NEUTROPHILS # BLD AUTO: 3.59 THOUSANDS/ÂΜL (ref 1.85–7.62)
NEUTS SEG NFR BLD AUTO: 48 % (ref 43–75)
NRBC BLD AUTO-RTO: 0 /100 WBCS
P AXIS: 30 DEGREES
PLATELET # BLD AUTO: 206 THOUSANDS/UL (ref 149–390)
PMV BLD AUTO: 10 FL (ref 8.9–12.7)
POTASSIUM SERPL-SCNC: 3.3 MMOL/L (ref 3.5–5.3)
PR INTERVAL: 152 MS
PROT SERPL-MCNC: 7.2 G/DL (ref 6.4–8.4)
PROTHROMBIN TIME: 18.6 SECONDS (ref 12.3–15)
QRS AXIS: 47 DEGREES
QRSD INTERVAL: 92 MS
QT INTERVAL: 408 MS
QTC INTERVAL: 455 MS
RBC # BLD AUTO: 4.6 MILLION/UL (ref 3.88–5.62)
SODIUM SERPL-SCNC: 140 MMOL/L (ref 135–147)
T WAVE AXIS: 53 DEGREES
T4 FREE SERPL-MCNC: 0.93 NG/DL (ref 0.61–1.12)
TSH SERPL DL<=0.05 MIU/L-ACNC: 4.8 UIU/ML (ref 0.45–4.5)
VENTRICULAR RATE: 75 BPM
WBC # BLD AUTO: 7.53 THOUSAND/UL (ref 4.31–10.16)

## 2025-06-06 PROCEDURE — 84439 ASSAY OF FREE THYROXINE: CPT | Performed by: EMERGENCY MEDICINE

## 2025-06-06 PROCEDURE — 85025 COMPLETE CBC W/AUTO DIFF WBC: CPT | Performed by: EMERGENCY MEDICINE

## 2025-06-06 PROCEDURE — 93010 ELECTROCARDIOGRAM REPORT: CPT | Performed by: INTERNAL MEDICINE

## 2025-06-06 PROCEDURE — 84484 ASSAY OF TROPONIN QUANT: CPT | Performed by: EMERGENCY MEDICINE

## 2025-06-06 PROCEDURE — 99285 EMERGENCY DEPT VISIT HI MDM: CPT

## 2025-06-06 PROCEDURE — 99285 EMERGENCY DEPT VISIT HI MDM: CPT | Performed by: EMERGENCY MEDICINE

## 2025-06-06 PROCEDURE — 36415 COLL VENOUS BLD VENIPUNCTURE: CPT | Performed by: EMERGENCY MEDICINE

## 2025-06-06 PROCEDURE — 80053 COMPREHEN METABOLIC PANEL: CPT | Performed by: EMERGENCY MEDICINE

## 2025-06-06 PROCEDURE — 84443 ASSAY THYROID STIM HORMONE: CPT | Performed by: EMERGENCY MEDICINE

## 2025-06-06 PROCEDURE — 85610 PROTHROMBIN TIME: CPT | Performed by: EMERGENCY MEDICINE

## 2025-06-06 PROCEDURE — 93005 ELECTROCARDIOGRAM TRACING: CPT

## 2025-06-06 RX ORDER — POTASSIUM CHLORIDE 1500 MG/1
40 TABLET, EXTENDED RELEASE ORAL ONCE
Status: COMPLETED | OUTPATIENT
Start: 2025-06-06 | End: 2025-06-06

## 2025-06-06 RX ADMIN — POTASSIUM CHLORIDE 40 MEQ: 1500 TABLET, EXTENDED RELEASE ORAL at 05:36

## 2025-06-06 NOTE — ED PROVIDER NOTES
Time reflects when diagnosis was documented in both MDM as applicable and the Disposition within this note       Time User Action Codes Description Comment    6/6/2025  5:32 AM Jeffery Khan [R00.2] Palpitations     6/6/2025  5:32 AM Jeffery Khan [E87.6] Acute hypokalemia           ED Disposition       ED Disposition   Discharge    Condition   Stable    Date/Time   Fri Jun 6, 2025  6:48 AM    Comment   Danilo Padilla Jr. discharge to home/self care.                   Assessment & Plan       Medical Decision Making    70 y.o. male presenting for palpitations.  VSS, EKG on arrival NSR. Patient asymptomatic in ED.  Will obtain labs to evaluate for SIRS, anemia, thyroid disease, electrolyte abnormality or GAYATRI.  Will obtain EKG and troponin to evaluate for arrhythmia or ACS.  Ddx would include paroxysmal arrhythmia.  No chest pain or dyspnea at time of evaluation. Reports compliance with coumadin. Do not suspect PE.    Reassessment: VSS, asymptomatic.  Remained in NSR on telemetry.  Reviewed labs in detail.   K repeleted. T4 pending at time of discharge.  Discussed need to contact PCP regarding coumadin dosing as INR is subtherapeutic.    Disposition: I have discussed with the patient our plan to discharge them from the ED and the patient is in agreement with this plan.     Discharge Plan: advised cardiology f/u, may benefit from ambulatory cardiac monitoring. Instructed to contact PCP regarding coumadin dosing. RTED precautions emphasized. The patient was provided a written after visit summary with strict RTED precautions.     Followup: I have discussed with the patient plan to follow up with their PCP and cardiology. Contact information provided in AVS.    Amount and/or Complexity of Data Reviewed  Labs: ordered.    Risk  Prescription drug management.        ED Course as of 06/06/25 0649   Fri Jun 06, 2025   0435 Procedure Note: EKG  Date/Time: 06/06/25 4:35 AM   Interpreted by: Jeffery Khan,  DO  Indications / Diagnosis: Chest pain  ECG reviewed by me, the ED Provider: yes   The EKG demonstrates:  Rhythm: normal sinus rhythm 75 BPM  Intervals: Normal AL and QT intervals  Axis: Normal axis  QRS/Blocks: Normal QRS  ST Changes: No acute ST/T waves changes. No AKIRA. No TWI.   0617 Sleeping, easily aroused.  Asymptomatic at this time.  Vitals are within normal limits.  Remains in NSR on telemetry.  Reviewed labs in detail.  K repleted.  Will obtain 2-hour troponin for dispo.       Medications   potassium chloride (Klor-Con M20) CR tablet 40 mEq (40 mEq Oral Given 6/6/25 0536)       ED Risk Strat Scores   HEART Risk Score      Flowsheet Row Most Recent Value   Heart Score Risk Calculator    History 0 Filed at: 06/06/2025 0551   ECG 0 Filed at: 06/06/2025 0551   Age 2 Filed at: 06/06/2025 0551   Risk Factors 1 Filed at: 06/06/2025 0551   Troponin 0 Filed at: 06/06/2025 0551   HEART Score 3 Filed at: 06/06/2025 0551          HEART Risk Score      Flowsheet Row Most Recent Value   Heart Score Risk Calculator    History 0 Filed at: 06/06/2025 0551   ECG 0 Filed at: 06/06/2025 0551   Age 2 Filed at: 06/06/2025 0551   Risk Factors 1 Filed at: 06/06/2025 0551   Troponin 0 Filed at: 06/06/2025 0551   HEART Score 3 Filed at: 06/06/2025 0551                      (ISAR) Identification of Seniors at Risk  Before the illness or injury that brought you to the Emergency, did you need someone to help you on a regular basis?: 0  In the last 24 hours, have you needed more help than usual?: 0  Have you been hospitalized for one or more nights during the past 6 months?: 0  In general, do you see well?: 0  In general, do you have serious problems with your memory?: 0  Do you take more than three different medications every day?: 1  ISAR Score: 1            SBIRT 20yo+      Flowsheet Row Most Recent Value   Initial Alcohol Screen: US AUDIT-C     1. How often do you have a drink containing alcohol? 0 Filed at: 06/06/2025 0437   2.  "How many drinks containing alcohol do you have on a typical day you are drinking?  0 Filed at: 06/06/2025 0437   3a. Male UNDER 65: How often do you have five or more drinks on one occasion? 0 Filed at: 06/06/2025 0437   3b. FEMALE Any Age, or MALE 65+: How often do you have 4 or more drinks on one occassion? 0 Filed at: 06/06/2025 0437   Audit-C Score 0 Filed at: 06/06/2025 0437   JOHN: How many times in the past year have you...    Used an illegal drug or used a prescription medication for non-medical reasons? Never Filed at: 06/06/2025 0437                            History of Present Illness       Chief Complaint   Patient presents with    Irregular Heart Beat     Pt stating that he woke up and he \"didn't feel right.\" Pt states he checked his pulse and he felt an irregular rhythm. Pt denies any pain at all. Pt states he no longer feels like he is in this irregular rhythm.        Past Medical History[1]   Past Surgical History[2]   Family History[3]   Social History[4]   E-Cigarette/Vaping    E-Cigarette Use Never User       E-Cigarette/Vaping Substances    Nicotine No     THC No     CBD No     Flavoring No     Other No     Unknown No       I have reviewed and agree with the history as documented.     Danilo Padilla Jr. is a 70 y.o. year old male with PMH of PE on coumadin, HTN, HLD presenting to the [facility] ED for palpations. Patient reportedly in normal state of health yesterday and prior to bed. He awoke about 15 minutes PTA after experiencing a bad dream. After awakening he felt palpitations and thought that his pulse felt \"irregular\". His symptoms arrived prior to arrival in emergency department and is asymptomatic at time of evaluation. He has felt occasional palpations in the past however never lasted as long as they did this evening. He denies recent shortness of breath or leg pain/swelling. No recent vomiting/diarrhea or abdominal pain. No blood in stool. No cough, congestion or rhinorrhea. Patient has " been compliant with previously prescribed coumadin. Denies alcohol consumption.      History provided by:  Medical records and patient   used: No        Review of Systems   Constitutional:  Negative for chills and fever.   HENT:  Negative for congestion.    Respiratory:  Negative for cough and shortness of breath.    Cardiovascular:  Positive for palpitations. Negative for chest pain and leg swelling.   Gastrointestinal:  Negative for abdominal pain, blood in stool, diarrhea, nausea and vomiting.   Genitourinary:  Negative for dysuria.   Neurological:  Negative for syncope and light-headedness.   All other systems reviewed and are negative.          Objective       ED Triage Vitals   Temperature Pulse Blood Pressure Respirations SpO2 Patient Position - Orthostatic VS   06/06/25 0437 06/06/25 0433 06/06/25 0433 06/06/25 0433 06/06/25 0433 06/06/25 0433   98.1 °F (36.7 °C) 78 (!) 173/91 18 99 % Sitting      Temp Source Heart Rate Source BP Location FiO2 (%) Pain Score    06/06/25 0437 06/06/25 0433 06/06/25 0433 -- --    Oral Monitor Right arm        Vitals      Date and Time Temp Pulse SpO2 Resp BP Pain Score FACES Pain Rating User   06/06/25 0630 -- 60 94 % 18 128/63 -- --    06/06/25 0545 -- 58 95 % 18 132/73 -- --    06/06/25 0515 -- 65 96 % 18 145/70 -- --    06/06/25 0500 -- 62 95 % 18 136/73 -- --    06/06/25 0445 -- 73 97 % 20 174/92 -- --    06/06/25 0437 98.1 °F (36.7 °C) -- -- -- -- -- --    06/06/25 0435 -- -- 99 % -- -- -- --    06/06/25 0433 -- 78 99 % 18 173/91 -- --             Physical Exam  Vitals and nursing note reviewed.   Constitutional:       General: He is not in acute distress.     Appearance: Normal appearance. He is well-developed. He is not ill-appearing, toxic-appearing or diaphoretic.   HENT:      Head: Normocephalic and atraumatic.      Nose: No congestion or rhinorrhea.     Cardiovascular:      Rate and Rhythm: Normal rate and regular rhythm.    Pulmonary:      Effort: Pulmonary effort is normal. No respiratory distress.      Breath sounds: Normal breath sounds. No wheezing or rales.   Abdominal:      General: Bowel sounds are normal. There is no distension.      Tenderness: There is no abdominal tenderness. There is no guarding or rebound.     Musculoskeletal:      Cervical back: Normal range of motion. No rigidity.      Right lower leg: No edema.      Left lower leg: No edema.     Skin:     General: Skin is warm.      Capillary Refill: Capillary refill takes less than 2 seconds.      Coloration: Skin is not pale.     Neurological:      Mental Status: He is alert and oriented to person, place, and time.     Psychiatric:         Mood and Affect: Mood normal.         Behavior: Behavior normal.         Results Reviewed       Procedure Component Value Units Date/Time    HS Troponin I 2hr [[id number]]  (Normal) Collected: 06/06/25 0620    Lab Status: Final result Specimen: Blood from Arm, Left Updated: 06/06/25 0648     hs TnI 2hr 5 ng/L      Delta 2hr hsTnI -1 ng/L     HS Troponin I 4hr [[id number]]     Lab Status: No result Specimen: Blood     HS Troponin 0hr (reflex protocol) [[id number]]  (Normal) Collected: 06/06/25 0438    Lab Status: Final result Specimen: Blood from Arm, Left Updated: 06/06/25 0546     hs TnI 0hr 6 ng/L     Comprehensive metabolic panel [[id number]]  (Abnormal) Collected: 06/06/25 0438    Lab Status: Final result Specimen: Blood from Arm, Left Updated: 06/06/25 0532     Sodium 140 mmol/L      Potassium 3.3 mmol/L      Chloride 104 mmol/L      CO2 29 mmol/L      ANION GAP 7 mmol/L      BUN 22 mg/dL      Creatinine 1.06 mg/dL      Glucose 104 mg/dL      Calcium 9.1 mg/dL      AST 19 U/L      ALT 21 U/L      Alkaline Phosphatase 69 U/L      Total Protein 7.2 g/dL      Albumin 4.1 g/dL      Total Bilirubin 0.53 mg/dL      eGFR 70 ml/min/1.73sq m     Narrative:      National Kid[employer] Foundation guidelines for Chronic Kidney Disease  (CKD):     Stage 1 with normal or high GFR (GFR > 90 mL/min/1.73 square meters)    Stage 2 Mild CKD (GFR = 60-89 mL/min/1.73 square meters)    Stage 3A Moderate CKD (GFR = 45-59 mL/min/1.73 square meters)    Stage 3B Moderate CKD (GFR = 30-44 mL/min/1.73 square meters)    Stage 4 Severe CKD (GFR = 15-29 mL/min/1.73 square meters)    Stage 5 End Stage CKD (GFR <15 mL/min/1.73 square meters)  Note: GFR calculation is accurate only with a steady state creatinine    TSH, 3rd generation with Free T4 reflex [[id number]]  (Abnormal) Collected: 06/06/25 0438    Lab Status: Final result Specimen: Blood from Arm, Left Updated: 06/06/25 0516     TSH 3RD GENERATON 4.803 uIU/mL     T4, free [[id number]] Collected: 06/06/25 0438    Lab Status: In process Specimen: Blood from Arm, Left Updated: 06/06/25 0516    Protime-INR [[id number]]  (Abnormal) Collected: 06/06/25 0438    Lab Status: Final result Specimen: Blood from Arm, Left Updated: 06/06/25 0503     Protime 18.6 seconds      INR 1.50    Narrative:      INR Therapeutic Range    Indication                                             INR Range      Atrial Fibrillation                                               2.0-3.0  Hypercoagulable State                                    2.0.2.3  Left Ventricular Asist Device                            2.0-3.0  Mechanical Heart Valve                                  -    Aortic(with afib, MI, embolism, HF, LA enlargement,    and/or coagulopathy)                                     2.0-3.0 (2.5-3.5)     Mitral                                                             2.5-3.5  Prosthetic/Bioprosthetic Heart Valve               2.0-3.0  Venous thromboembolism (VTE: VT, PE        2.0-3.0    CBC and differential [[id number]] Collected: 06/06/25 0438    Lab Status: Final result Specimen: Blood from Arm, Left Updated: 06/06/25 0444     WBC 7.53 Thousand/uL      RBC 4.60 Million/uL      Hemoglobin 14.6 g/dL      Hematocrit 42.2 %      MCV 92 fL       MCH 31.7 pg      MCHC 34.6 g/dL      RDW 13.9 %      MPV 10.0 fL      Platelets 206 Thousands/uL      nRBC 0 /100 WBCs      Segmented % 48 %      Immature Grans % 0 %      Lymphocytes % 38 %      Monocytes % 10 %      Eosinophils Relative 3 %      Basophils Relative 1 %      Absolute Neutrophils 3.59 Thousands/µL      Absolute Immature Grans 0.02 Thousand/uL      Absolute Lymphocytes 2.88 Thousands/µL      Absolute Monocytes 0.74 Thousand/µL      Eosinophils Absolute 0.24 Thousand/µL      Basophils Absolute 0.06 Thousands/µL             No orders to display       Procedures    ED Medication and Procedure Management   Prior to Admission Medications   Prescriptions Last Dose Informant Patient Reported? Taking?   Multiple Vitamin (multivitamin) tablet  Self Yes No   Sig: Take 1 tablet by mouth daily   Nutritional Supplements (THERALITH XR) TABS  Self Yes No   Sig: Take by mouth 2 (two) times a day 2 tablets BID   Omega-3 Fatty Acids (Fish Oil) 300 MG CAPS  Self Yes No   Sig: Take 2 g by mouth daily   Patient not taking: Reported on 9/5/2024   chlorthalidone 25 mg tablet   No No   Sig: TAKE 1/2 TABLET BY MOUTH EVERY DAY   enoxaparin (LOVENOX) 100 mg/mL   No No   Sig: Inject 1 mL (100 mg total) under the skin every 12 (twelve) hours for 5 days   Patient not taking: Reported on 11/1/2024   ezetimibe (ZETIA) 10 mg tablet  Self Yes No   Sig: Take 10 mg by mouth every other day   losartan (COZAAR) 50 mg tablet   No No   Sig: TAKE 1 TABLET BY MOUTH EVERY DAY   magnesium (MAGTAB) 84 MG (7MEQ) TBCR  Self Yes No   Sig: Take 84 mg by mouth daily   Patient not taking: Reported on 11/26/2024   omeprazole (PriLOSEC) 20 mg delayed release capsule  Self Yes No   Sig: Take 20 mg by mouth daily   potassium chloride (Klor-Con M20) 20 mEq tablet   No No   Sig: TAKE 1 TABLET BY MOUTH EVERY DAY   warfarin (COUMADIN) 5 mg tablet   Yes No   warfarin (Jantoven) 6 mg tablet   No No   Sig: Take 1 tablet (6 mg total) by mouth daily   Patient  not taking: Reported on 2/19/2025      Facility-Administered Medications: None     Patient's Medications   Discharge Prescriptions    No medications on file     No discharge procedures on file.  ED SEPSIS DOCUMENTATION   Time reflects when diagnosis was documented in both MDM as applicable and the Disposition within this note       Time User Action Codes Description Comment    6/6/2025  5:32 AM Jeffery Khan [R00.2] Palpitations     6/6/2025  5:32 AM Jeffery Khan [E87.6] Acute hypokalemia                      [1]   Past Medical History:  Diagnosis Date    GERD (gastroesophageal reflux disease)     Hyperlipidemia     Hypertension     Kidney stone     Meniere disease     Pulmonary embolism (HCC)     Thoracic aortic aneurysm (HCC)    [2]   Past Surgical History:  Procedure Laterality Date    ABDOMINAL SURGERY      BACK SURGERY      COCHLEAR IMPLANT Right     COLONOSCOPY      CT NEEDLE BIOPSY MEDIASTINUM  5/17/2019    FL INJECTION LEFT SHOULDER (ARTHROGRAM)  01/19/2022    HERNIA REPAIR      IVC FILTER INSERTION     [3]   Family History  Problem Relation Name Age of Onset    Hypertension Mother      Hyperlipidemia Mother      Hypertension Father      Hyperlipidemia Father     [4]   Social History  Tobacco Use    Smoking status: Never     Passive exposure: Never    Smokeless tobacco: Never   Vaping Use    Vaping status: Never Used   Substance Use Topics    Alcohol use: Never    Drug use: No        Jeffery Khan DO  06/06/25 0649

## 2025-06-06 NOTE — PROGRESS NOTES
Cardiology Follow Up    Danilo Padilla Jr.  1955  [id number]  [facility] [place]  [address]  [place] [id number] [phone]    1. Essential (primary) hypertension  Holter monitor    Basic metabolic panel    Basic metabolic panel      2. Pure hypercholesterolemia  Holter monitor    Basic metabolic panel    Basic metabolic panel      3. Mild ascending aorta dilatation (HCC)  Holter monitor    Basic metabolic panel    Basic metabolic panel      4. Palpitations  Holter monitor    Basic metabolic panel    Basic metabolic panel      5. Hypokalemia  Holter monitor    Basic metabolic panel    Basic metabolic panel        Interval History: Patient is here for f/u.  He has a mild descending thoracic aortic aneurysm seen on CT scan 12/2018.  It was 4.3 x 3.9 cm. He had IVC filter placement in reference to prior veno-thromboembolism.  ETT 1/2019  demonstrated no evidence of provokable ischemia.  His LVEF was 62%.  Patient admits to statin intolerance in reference to muscle and memory issues.  He did not even tolerate Pravastatin 10 mg per day. A lipid profile 9/2023 demonstrated TC of 202 with an HDL of 44 and a calculated LDL of 138.  Patient was on Zetia. Patient was having issues with palpitations.  48-hour HM 7/2022 demonstrated with average heart rate of 75 and with rare PACs and PVCs.  He does not drink alcohol or caffeinated beverages.  Echocardiogram 7/2023 demonstrated LVEF of 60% with no significant valve disease.  CTA of the chest 12/2/2023 demonstrated stable ectasia of the ascending aorta with maximal diameter of 4.0 cm.  There was minimal aortic calcification.  CTA of the chest 11/1/2024 demonstrated Heart unremarkable for patient's age and no Thoracic aortic aneurysm noted patient was seen in the ED 6/6/2025 in reference to palpitation.  He was found to be in NSR.   EKG demonstrated NSR with nonspecific ST segment change.  Potassium was 3.3.  Troponins were negative.  Patient retired in January.  He has noted intermittent palpitation.  The day he went to the ED he cut someone's lawn for an extended period of time in 90 degree weather.  He then developed palpitation.  He continues to have occasional palpitation.  Patient has no CP or significant dyspnea.  His VS are stable.  Will check an HM.  His HTN and HLD are stable on his current medicine.     Problem List[1]  Past Medical History[2]  Social History     Socioeconomic History   • Marital status:      Spouse name: Not on file   • Number of children: Not on file   • Years of education: Not on file   • Highest education level: Not on file   Occupational History   • Not on file   Tobacco Use   • Smoking status: Never     Passive exposure: Never   • Smokeless tobacco: Never   Vaping Use   • Vaping status: Never Used   Substance and Sexual Activity   • Alcohol use: Never   • Drug use: No   • Sexual activity: Not on file   Other Topics Concern   • Not on file   Social History Narrative   • Not on file     Social Drivers of Health     Financial Resource Strain: Not At Risk (1/24/2024)    Received from [facility]    Financial Resource Strain    • In the last 12 months did you skip medications to save money?: No    • In the last 12 months, was there a time when you needed to see a doctor but could not because of cost?: No   Food Insecurity: No Food Insecurity (9/6/2024)    Nursing - Inadequate Food Risk Classification    • Worried About Running Out of Food in the Last Year: Never true    • Ran Out of Food in the Last Year: Never true    • Ran Out of Food in the Last Year: Not on file   Transportation Needs: No Transportation Needs (9/6/2024)    PRAPARE - Transportation    • Lack of Transportation (Medical): No    • Lack of Transportation (Non-Medical): No   Physical Activity: Inactive (2/25/2020)    Received from [facility]     "Exercise Vital Sign    • On average, how many days per week do you engage in moderate to strenuous exercise (like a brisk walk)?: 0 days    • On average, how many minutes do you engage in exercise at this level?: 0 min   Stress: No Stress Concern Present (2/25/2020)    Received from [facility] [place] [facility] - Occupational Stress Questionnaire    • Feeling of Stress : Not at all   Social Connections: Not At Risk (1/24/2024)    Received from [facility]    Social Connections    • Do you often feel lonely?: No   Intimate Partner Violence: Not on file   Housing Stability: Low Risk  (9/6/2024)    Housing Stability Vital Sign    • Unable to Pay for Housing in the Last Year: No    • Number of Times Moved in the Last Year: 0    • Homeless in the Last Year: No      Family History[3]  Past Surgical History[4]  Current Medications[5]  Allergies   Allergen Reactions   • Moxifloxacin Shortness Of Breath and Hives   • Alcohol - Food Allergy      vertigo   • Caffeine - Food Allergy Other (See Comments)     vertigo   • Cephalosporins      hyperventilation   • Doxycycline    • Penicillins Hives   • Pravastatin      Other reaction(s): Respiratory Distress   • Prednisone Tinnitus     Other reaction(s): Unknown   • Tamiflu [Oseltamivir] Lightheadedness     Hyperventilation   • Claritin [Loratadine] Anxiety     Labs:not applicable  Imaging: No results found.    Review of Systems:  Review of Systems   All other systems reviewed and are negative.    Physical Exam:  /72 (BP Location: Left arm, Patient Position: Sitting, Cuff Size: Standard)   Pulse 91   Ht 5' 7\" (1.702 m)   Wt 102 kg (225 lb)   BMI 35.24 kg/m²   Physical Exam  Vitals reviewed.   Constitutional:       Appearance: He is well-developed.   HENT:      Head: Normocephalic and atraumatic.     Cardiovascular:      Rate and Rhythm: Normal rate.      Heart sounds: Normal heart sounds. "   Pulmonary:      Effort: Pulmonary effort is normal.      Breath sounds: Normal breath sounds.     Musculoskeletal:      Cervical back: Normal range of motion.     Skin:     General: Skin is warm and dry.     Neurological:      Mental Status: He is alert and oriented to person, place, and time.       Discussion/Summary: Patient with intermittent palpitation.  Will check a 48-hour HM.  As well we will check a BMP to reassess his potassium.  He will be more prone to arrhythmia unless his potassium is appropriately replaced.  I have asked him to call if there is a problem in the interim.  I will see him in follow-up in 6 months and sooner as is necessary.         [1]  Patient Active Problem List  Diagnosis   • Pes anserinus bursitis of left knee   • Bursitis of left knee   • Shoulder impingement syndrome, right   • Nontraumatic complete tear of left rotator cuff   • GERD (gastroesophageal reflux disease)   • Hyperlipidemia   • Hypertension   • Viral infection   • Contusion of rib on right side   • Kidney stones   • Acute pulmonary embolism without acute cor pulmonale (HCC)   • Hypokalemia   • Hordeolum externum of right upper eyelid   [2]  Past Medical History:  Diagnosis Date   • GERD (gastroesophageal reflux disease)    • Hyperlipidemia    • Hypertension    • Kidney stone    • Meniere disease    • Pulmonary embolism (HCC)    • Thoracic aortic aneurysm (HCC)    [3]  Family History  Problem Relation Name Age of Onset   • Hypertension Mother     • Hyperlipidemia Mother     • Hypertension Father     • Hyperlipidemia Father     [4]  Past Surgical History:  Procedure Laterality Date   • ABDOMINAL SURGERY     • BACK SURGERY     • COCHLEAR IMPLANT Right    • COLONOSCOPY     • CT NEEDLE BIOPSY MEDIASTINUM  5/17/2019   • FL INJECTION LEFT SHOULDER (ARTHROGRAM)  01/19/2022   • HERNIA REPAIR     • IVC FILTER INSERTION     [5]    Current Outpatient Medications:   •  chlorthalidone 25 mg tablet, TAKE 1/2 TABLET BY MOUTH EVERY  DAY, Disp: 45 tablet, Rfl: 1  •  ezetimibe (ZETIA) 10 mg tablet, Take 10 mg by mouth every other day, Disp: , Rfl:   •  losartan (COZAAR) 50 mg tablet, TAKE 1 TABLET BY MOUTH EVERY DAY, Disp: 90 tablet, Rfl: 1  •  Multiple Vitamin (multivitamin) tablet, Take 1 tablet by mouth in the morning., Disp: , Rfl:   •  omeprazole (PriLOSEC) 20 mg delayed release capsule, Take 20 mg by mouth in the morning., Disp: , Rfl:   •  potassium chloride (Klor-Con M20) 20 mEq tablet, TAKE 1 TABLET BY MOUTH EVERY DAY, Disp: 90 tablet, Rfl: 1  •  warfarin (COUMADIN) 5 mg tablet, , Disp: , Rfl:   •  magnesium (MAGTAB) 84 MG (7MEQ) TBCR, Take 84 mg by mouth daily (Patient not taking: Reported on 11/26/2024), Disp: , Rfl:   •  Nutritional Supplements (THERALITH XR) TABS, Take by mouth 2 (two) times a day 2 tablets BID, Disp: , Rfl:   •  Omega-3 Fatty Acids (Fish Oil) 300 MG CAPS, Take 2 g by mouth daily (Patient not taking: Reported on 9/5/2024), Disp: , Rfl:   •  warfarin (Jantoven) 6 mg tablet, Take 1 tablet (6 mg total) by mouth daily (Patient not taking: Reported on 6/16/2025), Disp: 30 tablet, Rfl: 0

## 2025-06-06 NOTE — DISCHARGE INSTRUCTIONS
You have been seen for palpitations. Please continue your home medication regimen. Contact your PCP to discuss coumadin dosing given low INR Return to the emergency department if you develop worsening palpitations, chest pain, trouble breathing, lightheadedness or any other symptoms of concern. Please follow up with your PCP and cardiologist by calling the number provided.

## 2025-06-09 ENCOUNTER — NURSE TRIAGE (OUTPATIENT)
Age: 70
End: 2025-06-09

## 2025-06-09 NOTE — TELEPHONE ENCOUNTER
"REASON FOR CONVERSATION: Palpitations  Received a call from Samy, and has an upcoming appt with Dr Valle was in ED with palpations and mentioned at ED should discuss a monitor.   States also having fatigue.     SYMPTOMS: skipped beats occasionally, lightheadedness    OTHER HEALTH INFORMATION:   ED recently was mentioned about heart monitor. Appt on 6/16/2025-advised can be discussed at time of appt or if Dr Valle wants prior  Recommend hydrating 64 oz.     PROTOCOL DISPOSITION: Discuss with Provider and Call Back Patient (overriding See Today in Office)    CARE ADVICE PROVIDED:   Continue to monitor signs, and symptoms.    PRACTICE FOLLOW-UP:   Discuss with provider and find out about monitor if wants to wait until appt or can have now.    Reason for Disposition   Skipped or extra beat(s) and occurs 4 or more times per minute    Additional Information   Negative: Heart beating very rapidly (e.g., > 140 / minute) and present now  (Exception: During exercise.)    Answer Assessment - Initial Assessment Questions  1. DESCRIPTION: \"Please describe your heart rate or heartbeat that you are having\" (e.g., fast/slow, regular/irregular, skipped or extra beats, \"palpitations\")      Steady, not constant irregular, skipping   2. ONSET: \"When did it start?\" (e.g., minutes, hours, days)       Past Thursday  3. DURATION: \"How long does it last\" (e.g., seconds, minutes, hours)      Seconds  4. PATTERN \"Does it come and go, or has it been constant since it started?\"  \"Does it get worse with exertion?\"   \"Are you feeling it now?\"      fatigue  5. TAP: \"Using your hand, can you tap out what you are feeling on a chair or table in front of you, so that I can hear?\" Note: Not all patients can do this.        N/a  6. HEART RATE: \"Can you tell me your heart rate?\" \"How many beats in 15 seconds?\"  Note: Not all patients can do this.        Current HR 90 /90   7. RECURRENT SYMPTOM: \"Have you ever had this before?\" If Yes, ask: " "\"When was the last time?\" and \"What happened that time?\"       Off and on for the last few yrs  8. CAUSE: \"What do you think is causing the palpitations?\"      Unsure   9. CARDIAC HISTORY: \"Do you have any history of heart disease?\" (e.g., heart attack, angina, bypass surgery, angioplasty, arrhythmia)       hyper  10. OTHER SYMPTOMS: \"Do you have any other symptoms?\" (e.g., dizziness, chest pain, sweating, difficulty breathing)        Lightheadedness, fatigue  Curved spine, and if lays on left side sometimes more palpations    Protocols used: Heart Rate and Heartbeat Questions-Adult-[place]    "

## 2025-06-16 ENCOUNTER — OFFICE VISIT (OUTPATIENT)
Dept: CARDIOLOGY CLINIC | Facility: CLINIC | Age: 70
End: 2025-06-16
Payer: COMMERCIAL

## 2025-06-16 VITALS
HEIGHT: 67 IN | BODY MASS INDEX: 35.31 KG/M2 | DIASTOLIC BLOOD PRESSURE: 72 MMHG | WEIGHT: 225 LBS | SYSTOLIC BLOOD PRESSURE: 116 MMHG | HEART RATE: 91 BPM

## 2025-06-16 DIAGNOSIS — R00.2 PALPITATIONS: ICD-10-CM

## 2025-06-16 DIAGNOSIS — I77.810 MILD ASCENDING AORTA DILATATION (HCC): ICD-10-CM

## 2025-06-16 DIAGNOSIS — E78.00 PURE HYPERCHOLESTEROLEMIA: ICD-10-CM

## 2025-06-16 DIAGNOSIS — E87.6 HYPOKALEMIA: ICD-10-CM

## 2025-06-16 DIAGNOSIS — I10 ESSENTIAL (PRIMARY) HYPERTENSION: Primary | ICD-10-CM

## 2025-06-16 PROCEDURE — 99214 OFFICE O/P EST MOD 30 MIN: CPT | Performed by: INTERNAL MEDICINE

## 2025-06-16 NOTE — PATIENT INSTRUCTIONS
Have ordered a 48-hour Holter monitor.  Get blood work done at your convenience.  I will see you in follow-up.

## 2025-06-17 ENCOUNTER — HOSPITAL ENCOUNTER (OUTPATIENT)
Dept: NON INVASIVE DIAGNOSTICS | Age: 70
Discharge: HOME | End: 2025-06-17
Attending: INTERNAL MEDICINE
Payer: COMMERCIAL

## 2025-06-17 DIAGNOSIS — R00.2 PALPITATIONS: ICD-10-CM

## 2025-06-17 DIAGNOSIS — E78.00 PURE HYPERCHOLESTEROLEMIA: ICD-10-CM

## 2025-06-17 DIAGNOSIS — E87.6 HYPOKALEMIA: ICD-10-CM

## 2025-06-17 DIAGNOSIS — I77.810 MILD ASCENDING AORTA DILATATION (HCC): ICD-10-CM

## 2025-06-17 DIAGNOSIS — I10 ESSENTIAL (PRIMARY) HYPERTENSION: ICD-10-CM

## 2025-06-17 PROCEDURE — 93226 XTRNL ECG REC<48 HR SCAN A/R: CPT

## 2025-06-17 PROCEDURE — 93225 XTRNL ECG REC<48 HRS REC: CPT

## 2025-07-07 ENCOUNTER — OFFICE VISIT (OUTPATIENT)
Dept: URGENT CARE | Facility: CLINIC | Age: 70
End: 2025-07-07
Payer: COMMERCIAL

## 2025-07-07 VITALS
TEMPERATURE: 97 F | OXYGEN SATURATION: 97 % | RESPIRATION RATE: 20 BRPM | DIASTOLIC BLOOD PRESSURE: 90 MMHG | HEART RATE: 70 BPM | SYSTOLIC BLOOD PRESSURE: 138 MMHG
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
## 2025-07-07 DIAGNOSIS — S40.861A TICK BITE OF RIGHT UPPER ARM, INITIAL ENCOUNTER: Primary | ICD-10-CM

## 2025-07-07 PROCEDURE — 24200 RMVL FB UPPER ARM/ELBW SUBQ: CPT

## 2025-07-07 PROCEDURE — 99213 OFFICE O/P EST LOW 20 MIN: CPT

## 2025-07-07 NOTE — PATIENT INSTRUCTIONS
Apply bacitracin or neosporin to the wound for the next 4-5 days. Keep it covered with a Band-Aid.    Come back to [facility] or go to the ER for any signs of Lyme: Bull's-eye rash, fever of unknown origin, migrating joint pain, or fatigue.    Follow up with your PCP in 4-6 weeks for re-evaluation and possibly Lyme blood work.

## 2025-07-07 NOTE — PROGRESS NOTES
[facility]  Name: Danilo Padilla Jr.      : 1955      MRN: [id number]  Encounter Provider: MAGDALENA Payne  Encounter Date: 2025   Encounter department: [facility] [place]  :  Assessment & Plan  Tick bite of right upper arm, initial encounter    Orders:    Foreign body removal    Tick removed today.  Bacitracin and Band-Aid applied.  Patient with allergy to doxycycline, will hold on antibiotics.  Plan to have patient follow-up with PCP to discuss blood work.      Patient Instructions    Apply bacitracin or neosporin to the wound for the next 4-5 days. Keep it covered with a Band-Aid.    Come back to [facility] or go to the ER for any signs of Lyme: Bull's-eye rash, fever of unknown origin, migrating joint pain, or fatigue.    Follow up with your PCP in 4-6 weeks for re-evaluation and possibly Lyme blood work.       If tests are performed, our office will contact you with results only if changes need to made to the care plan discussed with you at the visit. [facility].      Chief Complaint:   Chief Complaint   Patient presents with    Tick Removal     Patient has a tick on his right tricep area first noticed this morning      History of Present Illness   70-year-old male presenting with tick attached to the skin of his right upper arm. Patient states he noticed it at 8 AM today. States he checked his skin before bathing yesterday and it was not there. Was unable to remove it at home. It is not engorged. Just returned from trip to [facility].       History obtained from: patient    Review of Systems   Skin:         Tick bite     Past Medical History   Past Medical History[1]  Past Surgical History[2]  Family History[3]  he reports that he has never smoked. He has never been exposed to tobacco smoke. He has never used smokeless tobacco. He reports that he does not drink alcohol and does not use drugs.  Current Outpatient Medications  "  Medication Instructions    chlorthalidone 12.5 mg, Oral, Daily    ezetimibe (ZETIA) 10 mg, Every other day    Fish Oil 2 g, Daily    losartan (COZAAR) 50 mg, Oral, Daily    magnesium (MAGTAB) 84 mg, Daily    Multiple Vitamin (multivitamin) tablet 1 tablet, Daily    Nutritional Supplements (THERALITH XR) TABS 2 times daily    omeprazole (PRILOSEC) 20 mg, Daily    potassium chloride (Klor-Con M20) 20 mEq tablet 20 mEq, Oral, Daily    warfarin (COUMADIN) 5 mg tablet     warfarin (JANTOVEN) 6 mg, Oral, Daily (warfarin)   Allergies[4]     Objective   /90   Pulse 70   Temp (!) 97 °F (36.1 °C)   Resp 20   SpO2 97%      Physical Exam  Vitals and nursing note reviewed.   Constitutional:       General: He is not in acute distress.     Appearance: He is not ill-appearing.   HENT:      Head: Normocephalic and atraumatic.      Mouth/Throat:      Mouth: Mucous membranes are moist.     Cardiovascular:      Rate and Rhythm: Normal rate and regular rhythm.   Pulmonary:      Effort: Pulmonary effort is normal.      Breath sounds: Normal breath sounds.     Musculoskeletal:         General: Normal range of motion.      Cervical back: Normal range of motion and neck supple.     Skin:     General: Skin is warm and dry.          Neurological:      Mental Status: He is alert and oriented to person, place, and time.           Universal Protocol:  Consent: Verbal consent obtained  Risks and benefits: risks, benefits and alternatives were discussed  Consent given by: patient  Time out: Immediately prior to procedure a \"time out\" was called to verify the correct patient, procedure, equipment, support staff and site/side marked as required.  Timeout called at: 7/7/2025 9:18 AM.  Patient understanding: patient states understanding of the procedure being performed  Patient identity confirmed: verbally with patient  Foreign body removal    Date/Time: 7/7/2025 9:20 AM    Performed by: MAGDALENA Payne  Authorized by: Angela HECTOR" "MAGDALENA Campos  Body area: skin  General location: upper extremity  Location details: right upper arm  Localization method: visualized  Removal mechanism: forceps  Dressing: antibiotic ointment and dressing applied  Depth: subcutaneous  Complexity: simple  1 objects recovered.  Objects recovered: tick  Post-procedure assessment: foreign body removed  Patient tolerance: patient tolerated the procedure well with no immediate complications          Portions of the record may have been created with voice recognition software.  Occasional wrong word or \"sound a like\" substitutions may have occurred due to the inherent limitations of voice recognition software.  Read the chart carefully and recognize, using context, where substitutions have occurred.         [1]   Past Medical History:  Diagnosis Date    GERD (gastroesophageal reflux disease)     Hyperlipidemia     Hypertension     Kidney stone     Meniere disease     Pulmonary embolism (HCC)     Thoracic aortic aneurysm (HCC)    [2]   Past Surgical History:  Procedure Laterality Date    ABDOMINAL SURGERY      BACK SURGERY      COCHLEAR IMPLANT Right     COLONOSCOPY      CT NEEDLE BIOPSY MEDIASTINUM  5/17/2019    FL INJECTION LEFT SHOULDER (ARTHROGRAM)  01/19/2022    HERNIA REPAIR      IVC FILTER INSERTION     [3]   Family History  Problem Relation Name Age of Onset    Hypertension Mother      Hyperlipidemia Mother      Hypertension Father      Hyperlipidemia Father     [4]   Allergies  Allergen Reactions    Moxifloxacin Shortness Of Breath and Hives    Alcohol - Food Allergy      vertigo    Caffeine - Food Allergy Other (See Comments)     vertigo    Cephalosporins      hyperventilation    Doxycycline     Penicillins Hives    Pravastatin      Other reaction(s): Respiratory Distress    Prednisone Tinnitus     Other reaction(s): Unknown    Tamiflu [Oseltamivir] Lightheadedness     Hyperventilation    Claritin [Loratadine] Anxiety     "

## 2025-07-14 ENCOUNTER — HOSPITAL ENCOUNTER (INPATIENT)
Facility: HOSPITAL | Age: 70
LOS: 3 days | Discharge: HOME | DRG: 074 | End: 2025-07-17
Attending: EMERGENCY MEDICINE | Admitting: FAMILY MEDICINE
Payer: COMMERCIAL

## 2025-07-14 ENCOUNTER — OFFICE VISIT (OUTPATIENT)
Dept: URGENT CARE | Facility: CLINIC | Age: 70
End: 2025-07-14
Payer: COMMERCIAL

## 2025-07-14 VITALS
TEMPERATURE: 97.4 F | SYSTOLIC BLOOD PRESSURE: 122 MMHG | RESPIRATION RATE: 20 BRPM | OXYGEN SATURATION: 97 % | HEART RATE: 81 BPM | DIASTOLIC BLOOD PRESSURE: 70 MMHG

## 2025-07-14 DIAGNOSIS — A69.20 LYME DISEASE: ICD-10-CM

## 2025-07-14 DIAGNOSIS — M79.10 MYALGIA: ICD-10-CM

## 2025-07-14 DIAGNOSIS — R20.2 PARESTHESIA OF HAND, BILATERAL: Primary | ICD-10-CM

## 2025-07-14 DIAGNOSIS — M48.02 CERVICAL STENOSIS OF SPINE: ICD-10-CM

## 2025-07-14 DIAGNOSIS — N88.2 CERVICAL STENOSIS (UTERINE CERVIX): ICD-10-CM

## 2025-07-14 DIAGNOSIS — R20.2 TINGLING IN EXTREMITIES: Primary | ICD-10-CM

## 2025-07-14 PROBLEM — Z96.21 COCHLEAR IMPLANT IN PLACE: Status: ACTIVE | Noted: 2021-06-03

## 2025-07-14 LAB
ALBUMIN SERPL BCG-MCNC: 4.1 G/DL (ref 3.5–5)
ALP SERPL-CCNC: 71 U/L (ref 34–104)
ALT SERPL W P-5'-P-CCNC: 21 U/L (ref 7–52)
ANION GAP SERPL CALCULATED.3IONS-SCNC: 5 MMOL/L (ref 4–13)
APTT PPP: 36 SECONDS (ref 23–34)
AST SERPL W P-5'-P-CCNC: 21 U/L (ref 13–39)
BASOPHILS # BLD AUTO: 0.04 THOUSANDS/ÂΜL (ref 0–0.1)
BASOPHILS NFR BLD AUTO: 1 % (ref 0–1)
BILIRUB SERPL-MCNC: 0.79 MG/DL (ref 0.2–1)
BUN SERPL-MCNC: 13 MG/DL (ref 5–25)
CALCIUM SERPL-MCNC: 9.2 MG/DL (ref 8.4–10.2)
CHLORIDE SERPL-SCNC: 106 MMOL/L (ref 96–108)
CO2 SERPL-SCNC: 28 MMOL/L (ref 21–32)
CREAT SERPL-MCNC: 1.04 MG/DL (ref 0.6–1.3)
EOSINOPHIL # BLD AUTO: 0.11 THOUSAND/ÂΜL (ref 0–0.61)
EOSINOPHIL NFR BLD AUTO: 2 % (ref 0–6)
ERYTHROCYTE [DISTWIDTH] IN BLOOD BY AUTOMATED COUNT: 14 % (ref 11.6–15.1)
GFR SERPL CREATININE-BSD FRML MDRD: 72 ML/MIN/1.73SQ M
GLUCOSE SERPL-MCNC: 100 MG/DL (ref 65–140)
HCT VFR BLD AUTO: 42.4 % (ref 36.5–49.3)
HGB BLD-MCNC: 14.6 G/DL (ref 12–17)
IMM GRANULOCYTES # BLD AUTO: 0.01 THOUSAND/UL (ref 0–0.2)
IMM GRANULOCYTES NFR BLD AUTO: 0 % (ref 0–2)
INR PPP: 2.33 (ref 0.85–1.19)
LYMPHOCYTES # BLD AUTO: 1.36 THOUSANDS/ÂΜL (ref 0.6–4.47)
LYMPHOCYTES NFR BLD AUTO: 23 % (ref 14–44)
MAGNESIUM SERPL-MCNC: 2 MG/DL (ref 1.9–2.7)
MCH RBC QN AUTO: 32 PG (ref 26.8–34.3)
MCHC RBC AUTO-ENTMCNC: 34.4 G/DL (ref 31.4–37.4)
MCV RBC AUTO: 93 FL (ref 82–98)
MONOCYTES # BLD AUTO: 0.44 THOUSAND/ÂΜL (ref 0.17–1.22)
MONOCYTES NFR BLD AUTO: 8 % (ref 4–12)
NEUTROPHILS # BLD AUTO: 3.84 THOUSANDS/ÂΜL (ref 1.85–7.62)
NEUTS SEG NFR BLD AUTO: 66 % (ref 43–75)
NRBC BLD AUTO-RTO: 0 /100 WBCS
PLATELET # BLD AUTO: 203 THOUSANDS/UL (ref 149–390)
PMV BLD AUTO: 10 FL (ref 8.9–12.7)
POTASSIUM SERPL-SCNC: 3.5 MMOL/L (ref 3.5–5.3)
PROT SERPL-MCNC: 7 G/DL (ref 6.4–8.4)
PROTHROMBIN TIME: 25.8 SECONDS (ref 12.3–15)
RBC # BLD AUTO: 4.56 MILLION/UL (ref 3.88–5.62)
SODIUM SERPL-SCNC: 139 MMOL/L (ref 135–147)
WBC # BLD AUTO: 5.8 THOUSAND/UL (ref 4.31–10.16)

## 2025-07-14 PROCEDURE — 83735 ASSAY OF MAGNESIUM: CPT | Performed by: EMERGENCY MEDICINE

## 2025-07-14 PROCEDURE — 85730 THROMBOPLASTIN TIME PARTIAL: CPT | Performed by: EMERGENCY MEDICINE

## 2025-07-14 PROCEDURE — 93005 ELECTROCARDIOGRAM TRACING: CPT | Performed by: PHYSICIAN ASSISTANT

## 2025-07-14 PROCEDURE — 87207 SMEAR SPECIAL STAIN: CPT | Performed by: EMERGENCY MEDICINE

## 2025-07-14 PROCEDURE — 36415 COLL VENOUS BLD VENIPUNCTURE: CPT | Performed by: EMERGENCY MEDICINE

## 2025-07-14 PROCEDURE — 86618 LYME DISEASE ANTIBODY: CPT | Performed by: EMERGENCY MEDICINE

## 2025-07-14 PROCEDURE — 93005 ELECTROCARDIOGRAM TRACING: CPT

## 2025-07-14 PROCEDURE — 80053 COMPREHEN METABOLIC PANEL: CPT | Performed by: EMERGENCY MEDICINE

## 2025-07-14 PROCEDURE — 85610 PROTHROMBIN TIME: CPT | Performed by: EMERGENCY MEDICINE

## 2025-07-14 PROCEDURE — 87468 ANAPLSMA PHGCYTOPHLM AMP PRB: CPT | Performed by: EMERGENCY MEDICINE

## 2025-07-14 PROCEDURE — 99284 EMERGENCY DEPT VISIT MOD MDM: CPT

## 2025-07-14 PROCEDURE — 85025 COMPLETE CBC W/AUTO DIFF WBC: CPT | Performed by: EMERGENCY MEDICINE

## 2025-07-14 PROCEDURE — 99223 1ST HOSP IP/OBS HIGH 75: CPT | Performed by: FAMILY MEDICINE

## 2025-07-14 PROCEDURE — 99285 EMERGENCY DEPT VISIT HI MDM: CPT | Performed by: EMERGENCY MEDICINE

## 2025-07-14 PROCEDURE — 99214 OFFICE O/P EST MOD 30 MIN: CPT | Performed by: PHYSICIAN ASSISTANT

## 2025-07-14 RX ORDER — SODIUM CHLORIDE 9 MG/ML
75 INJECTION, SOLUTION INTRAVENOUS CONTINUOUS
Status: DISPENSED | OUTPATIENT
Start: 2025-07-14 | End: 2025-07-15

## 2025-07-14 RX ORDER — CHLORTHALIDONE 25 MG/1
12.5 TABLET ORAL DAILY
Status: DISCONTINUED | OUTPATIENT
Start: 2025-07-15 | End: 2025-07-17 | Stop reason: HOSPADM

## 2025-07-14 RX ORDER — CEFTRIAXONE 2 G/50ML
2000 INJECTION, SOLUTION INTRAVENOUS EVERY 24 HOURS
Status: DISCONTINUED | OUTPATIENT
Start: 2025-07-15 | End: 2025-07-16

## 2025-07-14 RX ORDER — LOSARTAN POTASSIUM 50 MG/1
50 TABLET ORAL DAILY
Status: DISCONTINUED | OUTPATIENT
Start: 2025-07-15 | End: 2025-07-17 | Stop reason: HOSPADM

## 2025-07-14 RX ORDER — WARFARIN SODIUM 5 MG/1
5 TABLET ORAL
Status: DISCONTINUED | OUTPATIENT
Start: 2025-07-14 | End: 2025-07-15

## 2025-07-14 RX ORDER — PANTOPRAZOLE SODIUM 20 MG/1
20 TABLET, DELAYED RELEASE ORAL
Status: DISCONTINUED | OUTPATIENT
Start: 2025-07-15 | End: 2025-07-17 | Stop reason: HOSPADM

## 2025-07-14 RX ORDER — POTASSIUM CHLORIDE 1500 MG/1
20 TABLET, EXTENDED RELEASE ORAL DAILY
Status: DISCONTINUED | OUTPATIENT
Start: 2025-07-15 | End: 2025-07-16

## 2025-07-14 RX ORDER — LIDOCAINE HYDROCHLORIDE 20 MG/ML
5 INJECTION, SOLUTION EPIDURAL; INFILTRATION; INTRACAUDAL; PERINEURAL ONCE
Status: DISCONTINUED | OUTPATIENT
Start: 2025-07-14 | End: 2025-07-14

## 2025-07-14 RX ORDER — CEFTRIAXONE 1 G/50ML
1000 INJECTION, SOLUTION INTRAVENOUS EVERY 24 HOURS
Status: DISCONTINUED | OUTPATIENT
Start: 2025-07-14 | End: 2025-07-14

## 2025-07-14 RX ADMIN — CEFTRIAXONE 1000 MG: 1 INJECTION, SOLUTION INTRAVENOUS at 17:37

## 2025-07-14 RX ADMIN — SODIUM CHLORIDE 75 ML/HR: 0.9 INJECTION, SOLUTION INTRAVENOUS at 23:59

## 2025-07-14 RX ADMIN — PHYTONADIONE 5 MG: 10 INJECTION, EMULSION INTRAMUSCULAR; INTRAVENOUS; SUBCUTANEOUS at 17:04

## 2025-07-14 NOTE — ASSESSMENT & PLAN NOTE
History of PE in his 40s, but then small PE again around 6 months  Currently on warfarin 5 mg daily  Reversing warfarin tonight with 5 mg IV vitamin K for lumbar puncture tomorrow

## 2025-07-14 NOTE — ASSESSMENT & PLAN NOTE
On HCTZ, but reports taking every other day, will hold for now in settings of n.p.o. after midnight

## 2025-07-14 NOTE — PROGRESS NOTES
[facility]  Name: Danilo Padilla Jr.      : 1955      MRN: [id number]  Encounter Provider: Eun Cisneros PA-C  Encounter Date: 2025   Encounter department: [facility] [place]  :  Assessment & Plan  Tingling in extremities    Orders:    Transfer to other facility        Patient Instructions  EKG normal.  Discussed with patient that he needs further workup in the ER.  Offered patient EMS which he declined.  Patient will proceed to the ER via private vehicle.  We discussed any changes to condition patient to pull over and call 911 immediately.  Follow up with PCP in 3-5 days.  Proceed to  ER if symptoms worsen.    If tests are performed, our office will contact you with results only if changes need to made to the care plan discussed with you at the visit. [facility].    Chief Complaint:   Chief Complaint   Patient presents with    Numbness     Patient was bit by a tick last week and was seen but not treated, he now has tingling in bilateral hands and forearms and muscle spasms as well as fatigue and overall not feeling well.      History of Present Illness   This is a 70-year-old male who notes he did not feel well upon waking this morning he notes bilateral arm tingling which comes and goes.  He states he feels off.  He complains of chills.  He complains of muscle cramps/tingling in the back of his legs.  He states that he was having some indigestion this weekend and some loose bowel movements.  He notes he did take a Prilosec this morning.  He did not currently denies abdominal pain, shortness of breath, chest pain, vomiting, confusion, lightheadedness, dizziness or headache.          Review of Systems   Constitutional:  Positive for chills.   Respiratory:  Negative for shortness of breath.    Cardiovascular:  Negative for chest pain.   Gastrointestinal:  Negative for diarrhea and vomiting.   Neurological:  Negative for facial asymmetry  "and weakness.     Past Medical History   Past Medical History[1]  Past Surgical History[2]  Family History[3]  he reports that he has never smoked. He has never been exposed to tobacco smoke. He has never used smokeless tobacco. He reports that he does not drink alcohol and does not use drugs.  Current Outpatient Medications   Medication Instructions    chlorthalidone 12.5 mg, Oral, Daily    ezetimibe (ZETIA) 10 mg, Every other day    Fish Oil 2 g, Daily    losartan (COZAAR) 50 mg, Oral, Daily    magnesium (MAGTAB) 84 mg, Daily    Multiple Vitamin (multivitamin) tablet 1 tablet, Daily    Nutritional Supplements (THERALITH XR) TABS 2 times daily    omeprazole (PRILOSEC) 20 mg, Daily    potassium chloride (Klor-Con M20) 20 mEq tablet 20 mEq, Oral, Daily    warfarin (COUMADIN) 5 mg tablet     warfarin (JANTOVEN) 6 mg, Oral, Daily (warfarin)   Allergies[4]     Objective   /70   Pulse 81   Temp (!) 97.4 °F (36.3 °C)   Resp 20   SpO2 97%      Physical Exam  Vitals and nursing note reviewed.   Constitutional:       General: He is not in acute distress.     Appearance: Normal appearance. He is not ill-appearing, toxic-appearing or diaphoretic.     Eyes:      General: No visual field deficit.      Cardiovascular:      Rate and Rhythm: Normal rate and regular rhythm.   Pulmonary:      Effort: Pulmonary effort is normal.      Breath sounds: Normal breath sounds.     Neurological:      General: No focal deficit present.      Mental Status: He is alert and oriented to person, place, and time.      Cranial Nerves: No facial asymmetry.      Sensory: No sensory deficit.      Motor: No weakness.      Coordination: Finger-Nose-Finger Test and Heel to Shin Test normal.      Gait: Gait is intact.     Psychiatric:         Mood and Affect: Mood normal.         Behavior: Behavior normal.         Portions of the record may have been created with voice recognition software.  Occasional wrong word or \"sound a like\" substitutions " may have occurred due to the inherent limitations of voice recognition software.  Read the chart carefully and recognize, using context, where substitutions have occurred.       [1]   Past Medical History:  Diagnosis Date    GERD (gastroesophageal reflux disease)     Hyperlipidemia     Hypertension     Kidney stone     Meniere disease     Pulmonary embolism (HCC)     Thoracic aortic aneurysm (HCC)    [2]   Past Surgical History:  Procedure Laterality Date    ABDOMINAL SURGERY      BACK SURGERY      COCHLEAR IMPLANT Right     COLONOSCOPY      CT NEEDLE BIOPSY MEDIASTINUM  5/17/2019    FL INJECTION LEFT SHOULDER (ARTHROGRAM)  01/19/2022    HERNIA REPAIR      IVC FILTER INSERTION     [3]   Family History  Problem Relation Name Age of Onset    Hypertension Mother      Hyperlipidemia Mother      Hypertension Father      Hyperlipidemia Father     [4]   Allergies  Allergen Reactions    Moxifloxacin Shortness Of Breath and Hives    Alcohol - Food Allergy      vertigo    Caffeine - Food Allergy Other (See Comments)     vertigo    Cephalosporins      hyperventilation    Doxycycline     Penicillins Hives    Pravastatin      Other reaction(s): Respiratory Distress    Prednisone Tinnitus     Other reaction(s): Unknown    Tamiflu [Oseltamivir] Lightheadedness     Hyperventilation    Claritin [Loratadine] Anxiety

## 2025-07-14 NOTE — ASSESSMENT & PLAN NOTE
"Has been having intermittent tingling for the last week, but since yesterday he felt like arms \"fell asleep\" to the forearm area, improved at the time of evaluation, he also has some chills and abdominal discomfort  Afebrile, no leukocytosis  Reports removing a tick over a week ago  ED contacted ID, advised lab workup including Anaplasma and Babesia/malaria as well as LP with Lyme PCR  Started on ceftriaxone, aware about listed allergy, but patient never had it and allergy listed as hyperventilation, discussed with ID, will start cautiously and observe for any reaction  Plan for LP puncture, but needs INR to be less than 1.5, currently 2.33 as he is on Coumadin./5 mg IV vitamin K, recheck INR in the morning  Monitor fever, leukocytosis    "

## 2025-07-14 NOTE — ED PROVIDER NOTES
Time reflects when diagnosis was documented in both MDM as applicable and the Disposition within this note       Time User Action Codes Description Comment    7/14/2025  1:46 PM Jerrod Shaffer [R20.2] Paresthesia of hand, bilateral     7/14/2025  1:46 PM Jerrod Shaffer [B34.9] Acute viral syndrome     7/14/2025  1:47 PM Jerrod Shaffer Remove [B34.9] Acute viral syndrome     7/14/2025  1:47 PM Jerrod Shaffer [M79.10] Myalgia     7/14/2025  1:47 PM Jerrod Shaffer Modify [M79.10] Myalgia probable lyme disease    7/14/2025  4:27 PM Shantel Donovan Modify [M79.10] Myalgia     7/14/2025  4:28 PM Shantel Donovan [A69.20] Lyme disease           ED Disposition       ED Disposition   Admit    Condition   Stable    Date/Time   Mon Jul 14, 2025  2:35 PM    Comment   Case was discussed with Zion and the patient's admission status was agreed to be Admission Status: inpatient status to the service of Dr. Donovan .               Assessment & Plan       Medical Decision Making  Differential includes Lyme encephalitis, neurologic Lyme arthritic Lyme, viral syndrome, electrolyte abnormality dehydration    Reviewed with CHARISSA Souza -  He should get an anaplasma PCR, and babesia/malaria smear to assess for concomitant infection with other illnesses carried by the same factor, and a lumbar puncture as well to assess for CNS disease. If he has CNS involvement would absolutely do intravenous therapy.      Amount and/or Complexity of Data Reviewed  Labs: ordered.    Risk  Prescription drug management.  Decision regarding hospitalization.             Medications   phytonadione (AQUA-MEPHYTON) 10 mg/mL 5 mg in sodium chloride 0.9 % 50 mL IVPB (has no administration in time range)   chlorthalidone tablet 12.5 mg ( Oral Held Dose 7/18/25 0900)   losartan (COZAAR) tablet 50 mg (has no administration in time range)   pantoprazole (PROTONIX) EC tablet 20 mg (has no administration in time range)   potassium chloride (Klor-Con M20) CR tablet 20 mEq  (has no administration in time range)   warfarin (COUMADIN) tablet 5 mg ( Oral Held Dose 7/17/25 1800)   cefTRIAXone (ROCEPHIN) IVPB (premix in dextrose) 1,000 mg 50 mL (has no administration in time range)       ED Risk Strat Scores                    No data recorded        SBIRT 20yo+      Flowsheet Row Most Recent Value   Initial Alcohol Screen: US AUDIT-C     1. How often do you have a drink containing alcohol? 0 Filed at: 07/14/2025 1215   2. How many drinks containing alcohol do you have on a typical day you are drinking?  0 Filed at: 07/14/2025 1215   3a. Male UNDER 65: How often do you have five or more drinks on one occasion? 0 Filed at: 07/14/2025 1215   3b. FEMALE Any Age, or MALE 65+: How often do you have 4 or more drinks on one occassion? 0 Filed at: 07/14/2025 1215   Audit-C Score 0 Filed at: 07/14/2025 1215   JOHN: How many times in the past year have you...    Used an illegal drug or used a prescription medication for non-medical reasons? Never Filed at: 07/14/2025 1215                            History of Present Illness       Chief Complaint   Patient presents with    Numbness     Bilateral arm numbness started this morning. It is no longer present. He reports he was bit by a tick about a week ago and thought it was maybe because of the tick bite. The numbness has been occurring intermittently for the past week after being bit by the tick.     Chills     Started today    Diarrhea     Over the weekend. He also describes indigestion that improved with omeprazole over the weekend.        Past Medical History[1]   Past Surgical History[2]   Family History[3]   Social History[4]   E-Cigarette/Vaping    E-Cigarette Use Never User       E-Cigarette/Vaping Substances    Nicotine No     THC No     CBD No     Flavoring No     Other No     Unknown No       I have reviewed and agree with the history as documented.     History from patient he has some chills epigastric gas and diarrhea loose soft stools.  He  has some numbness in bilateral hands wrists and forearms.  Symptoms started a week ago after he was bit by a tick at the right upper arm.  He thinks he was on there for less than 12 hours.  Symptoms started in the middle of the night a week ago at 3 AM he woke with the intermittent numbness sensation it improves with movement.  He has been taking Prilosec for the GI symptoms that have helped.  No fevers or rashes.        Review of Systems   Constitutional:  Positive for chills. Negative for fever.   HENT:  Negative for rhinorrhea and sore throat.    Respiratory:  Negative for shortness of breath.    Cardiovascular:  Negative for chest pain.   Gastrointestinal:  Positive for abdominal pain and diarrhea. Negative for constipation, nausea and vomiting.   Genitourinary:  Negative for dysuria and frequency.   Musculoskeletal:  Positive for arthralgias and myalgias.   Skin:  Negative for rash.   Neurological:  Positive for numbness. Negative for weakness.   All other systems reviewed and are negative.          Objective       ED Triage Vitals   Temperature Pulse Blood Pressure Respirations SpO2 Patient Position - Orthostatic VS   07/14/25 1049 07/14/25 1049 07/14/25 1049 07/14/25 1049 07/14/25 1049 07/14/25 1501   (!) 96.6 °F (35.9 °C) 78 151/89 18 98 % Lying      Temp Source Heart Rate Source BP Location FiO2 (%) Pain Score    07/14/25 1049 07/14/25 1049 07/14/25 1501 -- 07/14/25 1501    Temporal Monitor Right arm  No Pain      Vitals      Date and Time Temp Pulse SpO2 Resp BP Pain Score FACES Pain Rating User   07/14/25 1501 -- 58 96 % 18 166/79 No Pain -- [facility]   07/14/25 1445 -- 66 96 % 20 188/89 -- --    07/14/25 1245 -- 57 96 % 16 187/86 -- --    07/14/25 1232 97.8 °F (36.6 °C) -- -- -- -- -- --    07/14/25 1230 -- 61 96 % 16 140/75 -- --    07/14/25 1200 -- 58 94 % 16 130/70 -- --    07/14/25 1145 -- 65 98 % 16 148/78 -- -- RD   07/14/25 1049 96.6 °F (35.9 °C) 78 98 % 18 151/89 -- -- SD            Physical  Exam  Vitals and nursing note reviewed.   Constitutional:       Appearance: He is well-developed.   HENT:      Head: Normocephalic and atraumatic.      Right Ear: External ear normal.      Left Ear: External ear normal.      Nose: Nose normal.     Eyes:      Conjunctiva/sclera: Conjunctivae normal.      Pupils: Pupils are equal, round, and reactive to light.       Cardiovascular:      Rate and Rhythm: Normal rate and regular rhythm.      Heart sounds: Normal heart sounds.   Pulmonary:      Effort: Pulmonary effort is normal. No respiratory distress.      Breath sounds: Normal breath sounds. No wheezing.   Abdominal:      General: Bowel sounds are normal. There is no distension.      Palpations: Abdomen is soft.      Tenderness: There is no abdominal tenderness.     Musculoskeletal:         General: No deformity. Normal range of motion.      Cervical back: Normal range of motion and neck supple. No spinous process tenderness.     Skin:     General: Skin is warm and dry.      Findings: No rash.     Neurological:      General: No focal deficit present.      Mental Status: He is alert and oriented to person, place, and time.      GCS: GCS eye subscore is 4. GCS verbal subscore is 5. GCS motor subscore is 6.      Cranial Nerves: No cranial nerve deficit.      Sensory: No sensory deficit.      Motor: No weakness.      Coordination: Coordination normal.     Psychiatric:         Mood and Affect: Mood normal.         Results Reviewed       Procedure Component Value Units Date/Time    Anaplasma Phagocytophilum, PCR [[id number]] Collected: 07/14/25 1442    Lab Status: In process Specimen: Blood from Arm, Left Updated: 07/14/25 1450    Blood Parasite smear [[id number]] Collected: 07/14/25 1442    Lab Status: In process Specimen: Blood from Arm, Left Updated: 07/14/25 1449    APTT [[id number]]  (Abnormal) Collected: 07/14/25 1253    Lab Status: Final result Specimen: Blood from Arm, Right Updated: 07/14/25 1321     PTT 36 seconds      Protime-INR [[id number]]  (Abnormal) Collected: 07/14/25 1253    Lab Status: Final result Specimen: Blood from Arm, Right Updated: 07/14/25 1321     Protime 25.8 seconds      INR 2.33    Narrative:      INR Therapeutic Range    Indication                                             INR Range      Atrial Fibrillation                                               2.0-3.0  Hypercoagulable State                                    2.0.2.3  Left Ventricular Asist Device                            2.0-3.0  Mechanical Heart Valve                                  -    Aortic(with afib, MI, embolism, HF, LA enlargement,    and/or coagulopathy)                                     2.0-3.0 (2.5-3.5)     Mitral                                                             2.5-3.5  Prosthetic/Bioprosthetic Heart Valve               2.0-3.0  Venous thromboembolism (VTE: VT, PE        2.0-3.0    Comprehensive metabolic panel [[id number]] Collected: 07/14/25 1144    Lab Status: Final result Specimen: Blood from Arm, Left Updated: 07/14/25 1208     Sodium 139 mmol/L      Potassium 3.5 mmol/L      Chloride 106 mmol/L      CO2 28 mmol/L      ANION GAP 5 mmol/L      BUN 13 mg/dL      Creatinine 1.04 mg/dL      Glucose 100 mg/dL      Calcium 9.2 mg/dL      AST 21 U/L      ALT 21 U/L      Alkaline Phosphatase 71 U/L      Total Protein 7.0 g/dL      Albumin 4.1 g/dL      Total Bilirubin 0.79 mg/dL      eGFR 72 ml/min/1.73sq m     Narrative:      National Kid[employer] Foundation guidelines for Chronic Kidney Disease (CKD):     Stage 1 with normal or high GFR (GFR > 90 mL/min/1.73 square meters)    Stage 2 Mild CKD (GFR = 60-89 mL/min/1.73 square meters)    Stage 3A Moderate CKD (GFR = 45-59 mL/min/1.73 square meters)    Stage 3B Moderate CKD (GFR = 30-44 mL/min/1.73 square meters)    Stage 4 Severe CKD (GFR = 15-29 mL/min/1.73 square meters)    Stage 5 End Stage CKD (GFR <15 mL/min/1.73 square meters)  Note: GFR calculation is accurate only  with a steady state creatinine    Magnesium [[id number]]  (Normal) Collected: 07/14/25 1144    Lab Status: Final result Specimen: Blood from Arm, Left Updated: 07/14/25 1208     Magnesium 2.0 mg/dL     CBC and differential [[id number]] Collected: 07/14/25 1144    Lab Status: Final result Specimen: Blood from Arm, Left Updated: 07/14/25 1153     WBC 5.80 Thousand/uL      RBC 4.56 Million/uL      Hemoglobin 14.6 g/dL      Hematocrit 42.4 %      MCV 93 fL      MCH 32.0 pg      MCHC 34.4 g/dL      RDW 14.0 %      MPV 10.0 fL      Platelets 203 Thousands/uL      nRBC 0 /100 WBCs      Segmented % 66 %      Immature Grans % 0 %      Lymphocytes % 23 %      Monocytes % 8 %      Eosinophils Relative 2 %      Basophils Relative 1 %      Absolute Neutrophils 3.84 Thousands/µL      Absolute Immature Grans 0.01 Thousand/uL      Absolute Lymphocytes 1.36 Thousands/µL      Absolute Monocytes 0.44 Thousand/µL      Eosinophils Absolute 0.11 Thousand/µL      Basophils Absolute 0.04 Thousands/µL     LYME TOTAL AB W REFLEX TO IGM/IGG [[id number]] Collected: 07/14/25 1144    Lab Status: In process Specimen: Blood from Arm, Left Updated: 07/14/25 1150    Narrative:      The following orders were created for panel order LYME TOTAL AB W REFLEX TO IGM/IGG.  Procedure                               Abnormality         Status                     ---------                               -----------         ------                     Lyme Total AB W Reflex t...[[id number]]                      In process                   Please view results for these tests on the individual orders.    Lyme Total AB W Reflex to IGM/IGG [[id number]] Collected: 07/14/25 1144    Lab Status: In process Specimen: Blood from Arm, Left Updated: 07/14/25 1150            FL IN-patient lumbar puncture    (Results Pending)       ECG 12 Lead Documentation Only    Date/Time: 7/14/2025 12:55 PM    Performed by: Jerrod Shaffer DO  Authorized by: Jerrod Shaffer DO    Indications /  Diagnosis:  Palpitations  ECG reviewed by me, the ED Provider: yes    Patient location:  ED  Previous ECG:     Previous ECG:  Compared to current    Similarity:  Changes noted  Interpretation:     Interpretation: non-specific    Rate:     ECG rate assessment: normal    Ectopy:     Ectopy: none    QRS:     QRS axis:  Normal    QRS intervals:  Normal  Conduction:     Conduction: normal    ST segments:     ST segments:  Normal  T waves:     T waves: inverted      Inverted:  III  Comments:      This EKG was interpreted by me.      ED Medication and Procedure Management   Prior to Admission Medications   Prescriptions Last Dose Informant Patient Reported? Taking?   Multiple Vitamin (multivitamin) tablet  Self Yes No   Sig: Take 1 tablet by mouth in the morning.   Nutritional Supplements (THERALITH XR) TABS  Self Yes No   Sig: Take by mouth in the morning and in the evening. 2 tablets BID .   chlorthalidone 25 mg tablet 7/13/2025 Self No Yes   Sig: TAKE 1/2 TABLET BY MOUTH EVERY DAY   ezetimibe (ZETIA) 10 mg tablet  Self Yes No   Sig: Take 10 mg by mouth every other day   losartan (COZAAR) 50 mg tablet 7/13/2025 Self No Yes   Sig: TAKE 1 TABLET BY MOUTH EVERY DAY   omeprazole (PriLOSEC) 20 mg delayed release capsule Past Month Self Yes Yes   Sig: Take 20 mg by mouth in the morning.   potassium chloride (Klor-Con M20) 20 mEq tablet 7/14/2025 Self No Yes   Sig: TAKE 1 TABLET BY MOUTH EVERY DAY   warfarin (COUMADIN) 5 mg tablet 7/13/2025 Self Yes Yes      Facility-Administered Medications: None     Patient's Medications   Discharge Prescriptions    No medications on file     No discharge procedures on file.  ED SEPSIS DOCUMENTATION   Time reflects when diagnosis was documented in both MDM as applicable and the Disposition within this note       Time User Action Codes Description Comment    7/14/2025  1:46 PM Jerrod Shaffer [R20.2] Paresthesia of hand, bilateral     7/14/2025  1:46 PM Jerrod Shaffer [B34.9] Acute viral  syndrome     7/14/2025  1:47 PM Jerrod Shaffer [B34.9] Acute viral syndrome     7/14/2025  1:47 PM Jerrod Shaffer [M79.10] Myalgia     7/14/2025  1:47 PM Jerrod Shaffer Modify [M79.10] Myalgia probable lyme disease    7/14/2025  4:27 PM Shantel Donovan Modify [M79.10] Myalgia     7/14/2025  4:28 PM Shantel Donovan [A69.20] Lyme disease                      [1]   Past Medical History:  Diagnosis Date    GERD (gastroesophageal reflux disease)     Hyperlipidemia     Hypertension     Kidney stone     Meniere disease     Pulmonary embolism (HCC)     Thoracic aortic aneurysm (HCC)    [2]   Past Surgical History:  Procedure Laterality Date    ABDOMINAL SURGERY      BACK SURGERY      COCHLEAR IMPLANT Right     COLONOSCOPY      CT NEEDLE BIOPSY MEDIASTINUM  5/17/2019    FL INJECTION LEFT SHOULDER (ARTHROGRAM)  01/19/2022    HERNIA REPAIR      IVC FILTER INSERTION     [3]   Family History  Problem Relation Name Age of Onset    Hypertension Mother      Hyperlipidemia Mother      Hypertension Father      Hyperlipidemia Father     [4]   Social History  Tobacco Use    Smoking status: Never     Passive exposure: Never    Smokeless tobacco: Never   Vaping Use    Vaping status: Never Used   Substance Use Topics    Alcohol use: Never    Drug use: No        Jerrod Shaffer DO  07/14/25 9922

## 2025-07-15 ENCOUNTER — APPOINTMENT (INPATIENT)
Dept: CT IMAGING | Facility: HOSPITAL | Age: 70
DRG: 074 | End: 2025-07-15
Payer: COMMERCIAL

## 2025-07-15 ENCOUNTER — APPOINTMENT (INPATIENT)
Dept: INTERVENTIONAL RADIOLOGY/VASCULAR | Facility: HOSPITAL | Age: 70
DRG: 074 | End: 2025-07-15
Attending: FAMILY MEDICINE
Payer: COMMERCIAL

## 2025-07-15 LAB
ALBUMIN SERPL BCG-MCNC: 3.9 G/DL (ref 3.5–5)
ALP SERPL-CCNC: 65 U/L (ref 34–104)
ALT SERPL W P-5'-P-CCNC: 21 U/L (ref 7–52)
ANION GAP SERPL CALCULATED.3IONS-SCNC: 6 MMOL/L (ref 4–13)
APPEARANCE CSF: CLEAR
AST SERPL W P-5'-P-CCNC: 23 U/L (ref 13–39)
B BURGDOR IGG+IGM SER QL IA: NEGATIVE
BASOPHILS # BLD AUTO: 0.05 THOUSANDS/ÂΜL (ref 0–0.1)
BASOPHILS NFR BLD AUTO: 1 % (ref 0–1)
BILIRUB SERPL-MCNC: 0.86 MG/DL (ref 0.2–1)
BLD SMEAR FINDING POSITIVE INTERP: NO
BUN SERPL-MCNC: 13 MG/DL (ref 5–25)
CALCIUM SERPL-MCNC: 9.2 MG/DL (ref 8.4–10.2)
CHLORIDE SERPL-SCNC: 107 MMOL/L (ref 96–108)
CO2 SERPL-SCNC: 27 MMOL/L (ref 21–32)
CREAT SERPL-MCNC: 0.97 MG/DL (ref 0.6–1.3)
EOSINOPHIL # BLD AUTO: 0.22 THOUSAND/ÂΜL (ref 0–0.61)
EOSINOPHIL NFR BLD AUTO: 3 % (ref 0–6)
ERYTHROCYTE [DISTWIDTH] IN BLOOD BY AUTOMATED COUNT: 13.9 % (ref 11.6–15.1)
GFR SERPL CREATININE-BSD FRML MDRD: 78 ML/MIN/1.73SQ M
GLUCOSE CSF-MCNC: 68 MG/DL (ref 40–70)
GLUCOSE SERPL-MCNC: 102 MG/DL (ref 65–140)
GRAM STN SPEC: NORMAL
GRAM STN SPEC: NORMAL
HCT VFR BLD AUTO: 44.5 % (ref 36.5–49.3)
HGB BLD-MCNC: 15.1 G/DL (ref 12–17)
IMM GRANULOCYTES # BLD AUTO: 0.01 THOUSAND/UL (ref 0–0.2)
IMM GRANULOCYTES NFR BLD AUTO: 0 % (ref 0–2)
INR PPP: 1.41 (ref 0.85–1.19)
LYMPHOCYTES # BLD AUTO: 2.03 THOUSANDS/ÂΜL (ref 0.6–4.47)
LYMPHOCYTES NFR BLD AUTO: 30 % (ref 14–44)
LYMPHOCYTES NFR CSF MANUAL: 100 %
MCH RBC QN AUTO: 31.6 PG (ref 26.8–34.3)
MCHC RBC AUTO-ENTMCNC: 33.9 G/DL (ref 31.4–37.4)
MCV RBC AUTO: 93 FL (ref 82–98)
MONOCYTES # BLD AUTO: 0.64 THOUSAND/ÂΜL (ref 0.17–1.22)
MONOCYTES NFR BLD AUTO: 10 % (ref 4–12)
NEUTROPHILS # BLD AUTO: 3.78 THOUSANDS/ÂΜL (ref 1.85–7.62)
NEUTS SEG NFR BLD AUTO: 56 % (ref 43–75)
NRBC BLD AUTO-RTO: 0 /100 WBCS
PARASITE BLD: NO
PARASITE BLD: NO
PARASITE INFECTED RBC BLD-NFR: 0 %
PATH REV: NO
PLATELET # BLD AUTO: 203 THOUSANDS/UL (ref 149–390)
PMV BLD AUTO: 10.3 FL (ref 8.9–12.7)
POTASSIUM SERPL-SCNC: 3.5 MMOL/L (ref 3.5–5.3)
PROT SERPL-MCNC: 7 G/DL (ref 6.4–8.4)
PROTHROMBIN TIME: 17.7 SECONDS (ref 12.3–15)
RBC # BLD AUTO: 4.78 MILLION/UL (ref 3.88–5.62)
SODIUM SERPL-SCNC: 140 MMOL/L (ref 135–147)
TOTAL CELLS COUNTED BLD: NO
TOTAL CELLS COUNTED SPEC: 2
TUBE # CSF: 4
WBC # BLD AUTO: 6.73 THOUSAND/UL (ref 4.31–10.16)
WBC # CSF AUTO: 1 /UL (ref 0–5)

## 2025-07-15 PROCEDURE — 87496 CYTOMEG DNA AMP PROBE: CPT | Performed by: FAMILY MEDICINE

## 2025-07-15 PROCEDURE — 85025 COMPLETE CBC W/AUTO DIFF WBC: CPT | Performed by: FAMILY MEDICINE

## 2025-07-15 PROCEDURE — 87476 LYME DIS DNA AMP PROBE: CPT | Performed by: FAMILY MEDICINE

## 2025-07-15 PROCEDURE — 009U3ZX DRAINAGE OF SPINAL CANAL, PERCUTANEOUS APPROACH, DIAGNOSTIC: ICD-10-PCS | Performed by: FAMILY MEDICINE

## 2025-07-15 PROCEDURE — 87207 SMEAR SPECIAL STAIN: CPT | Performed by: PATHOLOGY

## 2025-07-15 PROCEDURE — 85060 BLOOD SMEAR INTERPRETATION: CPT | Performed by: PATHOLOGY

## 2025-07-15 PROCEDURE — 84157 ASSAY OF PROTEIN OTHER: CPT | Performed by: INTERNAL MEDICINE

## 2025-07-15 PROCEDURE — 70491 CT SOFT TISSUE NECK W/DYE: CPT

## 2025-07-15 PROCEDURE — 62328 DX LMBR SPI PNXR W/FLUOR/CT: CPT

## 2025-07-15 PROCEDURE — 99232 SBSQ HOSP IP/OBS MODERATE 35: CPT | Performed by: FAMILY MEDICINE

## 2025-07-15 PROCEDURE — 80053 COMPREHEN METABOLIC PANEL: CPT | Performed by: FAMILY MEDICINE

## 2025-07-15 PROCEDURE — 82945 GLUCOSE OTHER FLUID: CPT | Performed by: FAMILY MEDICINE

## 2025-07-15 PROCEDURE — 85610 PROTHROMBIN TIME: CPT | Performed by: FAMILY MEDICINE

## 2025-07-15 PROCEDURE — 77003 FLUOROGUIDE FOR SPINE INJECT: CPT

## 2025-07-15 PROCEDURE — 89051 BODY FLUID CELL COUNT: CPT | Performed by: FAMILY MEDICINE

## 2025-07-15 RX ORDER — WARFARIN SODIUM 5 MG/1
5 TABLET ORAL
Status: DISCONTINUED | OUTPATIENT
Start: 2025-07-16 | End: 2025-07-17 | Stop reason: HOSPADM

## 2025-07-15 RX ADMIN — LOSARTAN POTASSIUM 50 MG: 50 TABLET, FILM COATED ORAL at 08:40

## 2025-07-15 RX ADMIN — POTASSIUM CHLORIDE 20 MEQ: 1500 TABLET, EXTENDED RELEASE ORAL at 08:40

## 2025-07-15 RX ADMIN — IOHEXOL 75 ML: 350 INJECTION, SOLUTION INTRAVENOUS at 21:14

## 2025-07-15 RX ADMIN — CEFTRIAXONE 2000 MG: 2 INJECTION, SOLUTION INTRAVENOUS at 16:57

## 2025-07-15 RX ADMIN — PANTOPRAZOLE SODIUM 20 MG: 20 TABLET, DELAYED RELEASE ORAL at 07:25

## 2025-07-15 NOTE — CONSULTS
Consultation - Infectious Disease   Name: Danilo Padilla Jr. 70 y.o. male I MRN: [id number]  Unit/Bed#: -01 I Date of Admission: 7/14/2025   Date of Service: 7/15/2025 I Hospital Day: 1   Inpatient consult to Infectious Diseases  Consult performed by: Antoinette Haas MD  Consult ordered by: Shantel Donovan MD        Physician Requesting Evaluation: Shantel Donovan MD   Reason for Evaluation / Principal Problem: Intermittent tingling of hands, malaise, fatigue, and muscle spasms of forearms. +Tingling and spasms of back of legs        Assessment & Plan  Paresthesia of both hands  Hyperlipidemia  Hypertension    1.Paresthesias of both hands: Pt presented with c/o intermittent tingling of hands, malaise, fatigue, and muscle spasms of forearms x 1 week. He also noted tingling and cramps in the back of his legs. Pt reports tick bite one week PTA - ?dog tick.     He was seen in [facility] on 7[place]7 with tick attached to the skin of his right upper arm. Pt states that he noticed it at 8 AM today. He checked his skin before bathing on 7/6 and it was not there. He was unable to remove it at home. It was not engorged. He had  just returned from [facility] to [facility]. The tick was removed at [facility] but he was not started on Doxycycline because he reported that he was allergic to this med    On admission, he did not have fever and WBC count was 5.8K. His bilat arm numbness had resolved. He reports that his arms felt like they were asleep before he came to the ER. No imaging studies were done in the ER. He was started on Rocephin for possibility of Lyme meningitis. He underwent LP by IR this afternoon. Spinal fluid studies are pending. Lyme titer is neg which could indicate that he does not have Lyme disease or he has early Lyme disease although he reports a larger tick was removed which would be more c/w dog tick which does not transmit Lyme disease. Blood parasite smear is neg. CT of neck has been ordered    - Cont Rocephin for now  - Awaiting  results of LP  - Would do other studies such as neck CT and head CT to look for another etiology for his current neurologic sx's  - If sx's recur, would consult Neuro for further eval  - Awaiting results of other serologies for tick borne illnesses  - Cont tx of HTN and HLD as per Hospitalist service  - Will monitor for the development of toxicity to current abx tx since Rocephin can cause rash and C diff diarrhea      I have discussed the above management plan in detail with the primary service.   I have discussed with Dr. Shantel Donovan the above plan to cont current abx tx. She agrees with the plan.      Antibiotics:  Rocephin: #2    History of Present Illness   aDnilo Padilla Jr. is a 70 y.o. year old male with GERD, HLD, HTN, kidney stones, Meniere's disease, PE - s/p IVC filter placement, TAA, and decreased hearing - s/p cochlear implants who was admitted on 7/14 with c/o intermittent tingling of hands, malaise, fatigue, and muscle spasms of forearms x 1 week. He also noted tingling and cramps in the back of his legs. Pt reports tick bite one week PTA - ?dog tick.     He was [place] [facility] [place]7 with tick attached to the skin of his right upper arm. Pt states that he noticed it at 8 AM today. He checked his skin before bathing on 7/6 and it was not there. He was unable to remove it at home. It was not engorged. He had  just returned [facility] to [facility]. The tick was removed at [facility] but he was not started on Doxycycline because he reported that he was allergic to this med    On admission, he did not have fever and WBC count was 5.8K. His bilat arm numbness had resolved. He reports that his arms felt like they were asleep before he came to the ER. No imaging studies were done in the ER. He was started on Rocephin for possibility of Lyme meningitis. He underwent LP by IR this afternoon. Spinal fluid studies are pending. Lyme titer and blood parasite smear are neg.     Pt denied fever, chills, H/A, cough, SOB, CP,  N/V/D, abd pain, joint swelling, rash, or dysuria      A complete review of systems is negative other than that noted in the HPI.    Historical Information   Past Medical History[1]  Past Surgical History[2]  Social History[3]  E-Cigarette/Vaping    E-Cigarette Use Never User      E-Cigarette/Vaping Substances    Nicotine No     THC No     CBD No     Flavoring No     Other No     Unknown No      Family history non-contributoryReason cannot obtain ROS/PMH:[id number]}      Objective :  Temp:  [97.7 °F (36.5 °C)-98.5 °F (36.9 °C)] 98.5 °F (36.9 °C)  HR:  [58-70] 68  BP: (123-185)/(63-86) 147/79  Resp:  [13-21] 17  SpO2:  [92 %-97 %] 92 %  O2 Device: None (Room air)    General:  No acute distress  Psychiatric:  Awake and alert  Pulmonary:  Normal respiratory excursion without accessory muscle use  Cardiovasular: RRR, nml S1, S2  Abdomen:  Soft, nontender  Extremities:  No edema  Skin:  No rashes        Lab Results: I have reviewed the following results:  Results from last 7 days   Lab Units 07/15/25  0336 07/14/25  1144   WBC Thousand/uL 6.73 5.80   HEMOGLOBIN g/dL 15.1 14.6   PLATELETS Thousands/uL 203 203     Results from last 7 days   Lab Units 07/15/25  0336 07/14/25  1144   SODIUM mmol/L 140 139   POTASSIUM mmol/L 3.5 3.5   CHLORIDE mmol/L 107 106   CO2 mmol/L 27 28   BUN mg/dL 13 13   CREATININE mg/dL 0.97 1.04   EGFR ml/min/1.73sq m 78 72   CALCIUM mg/dL 9.2 9.2   AST U/L 23 21   ALT U/L 21 21   ALK PHOS U/L 65 71   ALBUMIN g/dL 3.9 4.1                           Imaging Results Review: No pertinent imaging studies reviewed.  Other Study Results Review: EKG was reviewed.              [1]   Past Medical History:  Diagnosis Date    GERD (gastroesophageal reflux disease)     Hyperlipidemia     Hypertension     Kidney stone     Meniere disease     Pulmonary embolism (HCC)     Thoracic aortic aneurysm (HCC)    [2]   Past Surgical History:  Procedure Laterality Date    ABDOMINAL SURGERY      BACK SURGERY      COCHLEAR  IMPLANT Right     COLONOSCOPY      CT NEEDLE BIOPSY MEDIASTINUM  5/17/2019    FL INJECTION LEFT SHOULDER (ARTHROGRAM)  01/19/2022    FL LUMBAR PUNCTURE DIAGNOSTIC  7/15/2025    HERNIA REPAIR      IVC FILTER INSERTION     [3]   Social History  Tobacco Use    Smoking status: Never     Passive exposure: Never    Smokeless tobacco: Never   Vaping Use    Vaping status: Never Used   Substance and Sexual Activity    Alcohol use: Never    Drug use: No

## 2025-07-15 NOTE — UTILIZATION REVIEW
Initial Clinical Review    Admission: Date/Time/Statement:   Admission Orders (From admission, onward)       Ordered        07/14/25 1436  INPATIENT ADMISSION  Once                          Orders Placed This Encounter   Procedures    INPATIENT ADMISSION     Standing Status:   Standing     Number of Occurrences:   1     Level of Care:   Med Surg [16]     Estimated length of stay:   More than 2 Midnights     Certification:   I certify that inpatient services are medically necessary for this patient for a duration of greater than two midnights. See H&P and MD Progress Notes for additional information about the patient's course of treatment.     ED Arrival Information       Expected   7/14/2025 10:27    Arrival   7/14/2025 10:42    Acuity   Urgent              Means of arrival   Walk-In    Escorted by   Self    Service   Hospitalist    Admission type   Emergency              Arrival complaint   tingling in extremities             Chief Complaint   Patient presents with    Numbness     Bilateral arm numbness started this morning. It is no longer present. He reports he was bit by a tick about a week ago and thought it was maybe because of the tick bite. The numbness has been occurring intermittently for the past week after being bit by the tick.     Chills     Started today    Diarrhea     Over the weekend. He also describes indigestion that improved with omeprazole over the weekend.        Initial Presentation: 70 y.o. male  with a PMH of Ménière's disease,cochlear implants,  PE x 2 currently on Coumadin, HTN, HL, and back pain who presents to the ED from home with complaint of intermittent hand tingling for the last week and chills. Since yesterday he felt his arms were asleep, improved with shaking.  Patient reports removing a tick over a week ago.     7/14 Inpatient admission for evaluation and treatment of paresthesia of both hands, history of PE:  IV ceftriaxone. Plan for LP, but needs INR <1, currently 2.33.  Vitamin K 5 mg IV, recheck INR in AM.  Continue losartan, hold HCTZ. Anticipated Length of Stay: Patient will be admitted on an inpatient basis with an anticipated length of stay of greater than 2 midnights secondary to work up of paresthesia, suspicion for the neuro Lyme, pending lumbar puncture.      7/15 Internal Medicine: Patient still reports intermittent tingling in both hands.  INR 1.41, for LP today. Infectious Disease consulted. Infectious Disease consult:  Lyme titer is neg which could indicate that he does not have Lyme disease or he has early Lyme disease although he reports a larger tick was removed which would be more c/w dog tick which does not transmit Lyme disease. Blood parasite smear is neg. CT of neck has been ordered. Cont Rocephin for now.  Awaiting results of LP.  Would do other studies such as neck CT and head CT to look for another etiology for his current neurologic sx's.  If sx's recur, would consult Neuro for further eval.  Awaiting results of other serologies for tick borne illnesses.    7/16 Day 3: Has surpassed a 2nd midnight with active treatments and services.  Internal Medicine: Patient still reports bilateral tingling in his hands that is intermittent, but improved.  LP results so far unrevealing. Warfarin resumed today. Neurology consulted. Neurology consult: CSF (LP 7/15): lyme PCR pending, WBC 1, CMV pending, glucose 68, gram stain with no polys or bacteria seen.  With pt's symptoms fluctuating, not constant, this argues against Lyme polyradicular neuritis. No loss of reflexes arguing against Lyme polyradicular neuritis. Likely carpal tunnel.  Plan: Recommend wrsit splints at night.   If symptoms continue and/or worsen, could consider repeat EMG as outpatient. Medical management and correction of metabolic and infectious disturbances per primary team. Avoid CNS altering medications if possible. Continue supportive care.  Continue to monitor neurologic status.      ED  Treatment-Medication Administration from 07/14/2025 1027 to 07/14/2025 2023      Date/Time Order Dose Route Action   07/14/2025 1704 phytonadione (AQUA-MEPHYTON) 10 mg/mL 5 mg in sodium chloride 0.9 % 50 mL IVPB 5 mg Intravenous New Bag   07/14/2025 1800 warfarin (COUMADIN) tablet 5 mg -- Oral Held Dose   07/14/2025 1737 cefTRIAXone (ROCEPHIN) IVPB (premix in dextrose) 1,000 mg 50 mL 1,000 mg Intravenous New Bag            Scheduled Medications:    cefTRIAXone, 2,000 mg, Intravenous, Q24H  [Held by provider] chlorthalidone, 12.5 mg, Oral, Daily  losartan, 50 mg, Oral, Daily  pantoprazole, 20 mg, Oral, Early Morning  potassium chloride, 20 mEq, Oral, Daily  [Held by provider] warfarin, 5 mg, Oral, Daily (warfarin)      Continuous IV Infusions:    sodium chloride 0.9 % infusion  Rate: 75 mL/hr Dose: 75 mL/hr  Freq: Continuous Route: IV  Indications of Use: IV Hydration  Last Dose: Stopped (07/15/25 1202)  Start: 07/14/25 2300 End: 07/15/25 1158    PRN Meds:    acetaminophen, 650 mg, Oral, Q6H PRN      ED Triage Vitals   Temperature Pulse Respirations Blood Pressure SpO2 Pain Score   07/14/25 1049 07/14/25 1049 07/14/25 1049 07/14/25 1049 07/14/25 1049 07/14/25 1501   (!) 96.6 °F (35.9 °C) 78 18 151/89 98 % No Pain     Weight (last 2 days)       Date/Time Weight    07/14/25 20:32:25 99.2 (218.6)            Vital Signs (last 3 days)      Date/Time Temp Pulse Resp BP MAP (mmHg) SpO2 O2 Device Patient Position - Orthostatic VS [place] Coma Scale Score Pain   07/16/25 15:37:48 98.2 °F (36.8 °C) 64 16 122/65 84 95 % -- -- -- --   07/16/25 0815 -- -- -- -- -- -- None (Room air) -- 15 No Pain   07/16/25 07:20:43 97.6 °F (36.4 °C) 65 16 129/83 98 93 % -- -- -- --   07/16/25 04:06:57 98.1 °F (36.7 °C) 66 16 128/85 99 94 % -- -- -- --   07/15/25 19:39:47 98.2 °F (36.8 °C) 74 17 143/77 99 94 % -- Lying -- --   07/15/25 1932 -- -- -- -- -- -- -- -- 15 No Pain   07/15/25 14:53:55 98.5 °F (36.9 °C) -- 17 147/79 102 -- None (Room  air) Lying -- --   07/15/25 0900 -- -- -- -- -- -- -- -- -- No Pain   07/15/25 0840 -- -- -- -- -- -- -- -- 15 --   07/15/25 08:39:40 98.5 °F (36.9 °C) 68 16 150/81 104 92 % -- -- -- --   07/15/25 03:25:46 97.7 °F (36.5 °C) 63 18 161/81 108 96 % None (Room air) Sitting -- --   07/14/25 2045 -- -- -- -- -- -- None (Room air) -- 15 No Pain   07/14/25 20:32:25 98.2 °F (36.8 °C) 68 -- 164/85 111 94 % -- Sitting -- No Pain   07/14/25 20:31:24 98.2 °F (36.8 °C) 70 16 166/84 111 97 % None (Room air) -- -- --   07/14/25 20:30:08 98.2 °F (36.8 °C) -- 16 166/84 111 -- -- Sitting -- No Pain   07/14/25 1910 -- 65 13 185/86 124 97 % None (Room air) Lying -- No Pain   07/14/25 1731 -- 58 13 123/63 90 96 % None (Room air) Lying -- --   07/14/25 1701 -- 64 21 153/74 106 95 % -- -- -- --   07/14/25 1501 -- 58 18 166/79 113 96 % None (Room air) Lying -- No Pain   07/14/25 1445 -- 66 20 188/89 128 96 % -- -- -- --   07/14/25 1245 -- 57 16 187/86 124 96 % -- -- -- --   07/14/25 1232 97.8 °F (36.6 °C) -- -- -- -- -- -- -- -- --   07/14/25 1230 -- 61 16 140/75 99 96 % -- -- -- --   07/14/25 1215 -- -- -- -- -- -- -- -- 15 --   07/14/25 1200 -- 58 16 130/70 96 94 % -- -- -- --   07/14/25 1145 -- 65 16 148/78 107 98 % -- -- -- --   07/14/25 1049 96.6 °F (35.9 °C) 78 18 151/89 -- 98 % None (Room air) -- -- --              Pertinent Labs/Diagnostic Test Results:       Radiology:    CT head wo contrast   Final Interpretation by Rj Ayoub MD (07/16 3486)      No acute intracranial abnormality given limitations of beam-hardening streak artifact from right cochlear implant limiting evaluation in some parts of right right cerebral hemisphere (worse in right parietal lobe).                  Workstation performed: [id number]         CT soft tissue neck w contrast   Final Interpretation by Doug Macdonald MD (07/16 5855)     BONY STRUCTURES:  Multilevel degenerative changes throughout the cervical spine. Moderate right foraminal  stenosis at C3-4 secondary to  facet and uncovertebral spurring. Moderate left foraminal stenosis at C4-5 secondary to facet and uncovertebral arthropathy. Potentially severe left foraminal stenosis at C5-6, C6-7 due to similar factors.       1.  No suspicious neck mass or cervical adenopathy.   2.  Cervical spondylosis with foraminal stenoses as above, recommend correlation with clinical exam.   3.  See comments above for full analysis.            Workstation performed: [id number]         FL IN-patient lumbar puncture   Final Interpretation by Pablo Goodman MD (07/15 1607)      Successful fluoroscopically guided lumbar puncture.         Workstation performed: [id number]           Cardiology:    7/14 ED EKG:    Previous ECG:  Compared to current    Similarity:  Changes noted  Interpretation:     Interpretation: non-specific    Rate:     ECG rate assessment: normal    Ectopy:     Ectopy: none    QRS:     QRS axis:  Normal    QRS intervals:  Normal  Conduction:     Conduction: normal    ST segments:     ST segments:  Normal  T waves:     T waves: inverted      Inverted:  III        Results from last 7 days   Lab Units 07/16/25  0412 07/15/25  0336 07/14/25  1144   WBC Thousand/uL 6.61 6.73 5.80   HEMOGLOBIN g/dL 14.6 15.1 14.6   HEMATOCRIT % 42.2 44.5 42.4   PLATELETS Thousands/uL 184 203 203   TOTAL NEUT ABS Thousands/µL 4.12 3.78 3.84         Results from last 7 days   Lab Units 07/16/25  0412 07/15/25  0336 07/14/25  1144   SODIUM mmol/L 139 140 139   POTASSIUM mmol/L 3.5 3.5 3.5   CHLORIDE mmol/L 108 107 106   CO2 mmol/L 24 27 28   ANION GAP mmol/L 7 6 5   BUN mg/dL 16 13 13   CREATININE mg/dL 1.04 0.97 1.04   EGFR ml/min/1.73sq m 72 78 72   CALCIUM mg/dL 8.8 9.2 9.2   MAGNESIUM mg/dL  --   --  2.0     Results from last 7 days   Lab Units 07/15/25  0336 07/14/25  1144   AST U/L 23 21   ALT U/L 21 21   ALK PHOS U/L 65 71   TOTAL PROTEIN g/dL 7.0 7.0   ALBUMIN g/dL 3.9 4.1   TOTAL BILIRUBIN mg/dL 0.86  0.79         Results from last 7 days   Lab Units 07/16/25  0412 07/15/25  0336 07/14/25  1144   GLUCOSE RANDOM mg/dL 114 102 100               Results from last 7 days   Lab Units 07/16/25  0412 07/15/25  0336 07/14/25  1253   PROTIME seconds 14.8 17.7* 25.8*   INR  1.10 1.41* 2.33*   PTT seconds  --   --  36*        Latest Reference Range & Units 07/14/25 11:44   Lyme total antibody Negative  Negative      Latest Reference Range & Units 07/14/25 14:42   Intracellular Red Blood parasites present? No  No   % Parasitemia % 0   Extracellular parasites or organisms present: No  No   White Blood Cell bacteria/organisms present: No  No       Results from last 7 days   Lab Units 07/15/25  1526   APPEARANCE CSF  Clear   TUBE NUM CSF  4   WBC CSF /uL 1   XANTHOCHROMIA  No   LYMPHS % (CSF) % 100   GLUCOSE CSF mg/dL 68           Past Medical History[1]  Present on Admission:   Paresthesia of both hands   Hyperlipidemia   Hypertension   Meniere's disease, unspecified ear   Personal history of pulmonary embolism   Sensorineural hearing loss of both ears      Admitting Diagnosis: Lyme disease [A69.20]  Myalgia [M79.10]  Tingling in extremities [R20.2]  Paresthesia of hand, bilateral [R20.2]  Age/Sex: 70 y.o. male        Network Utilization Review Department  ATTENTION: Please call with any questions or concerns to [phone] and carefully listen to the prompts so that you are directed to the right person. All voicemails are confidential.   For Discharge needs, contact Care Management DC Support Team at [phone] opt. 2  Send all requests for admission clinical reviews, approved or denied determinations and any other requests to dedicated fax number below belonging to the campus where the patient is receiving treatment. List of dedicated fax numbers for the Facilities:  FACILITY NAME UR FAX NUMBER   ADMISSION DENIALS (Administrative/Medical Necessity) [phone]   DISCHARGE SUPPORT TEAM (NETWORK) [phone]   PARENT  CHILD HEALTH (Maternity/NICU/Pediatrics) [phone]   [facility] [phone]   [facility] [phone]   [facility] [phone]   [facility] [phone]   [facility] [phone]   [facility] [phone]   [facility] [phone]   [facility] [phone]   [facility] [phone]   [facility] [phone]   [facility] [phone]   [facility] [phone]              [1]   Past Medical History:  Diagnosis Date    GERD (gastroesophageal reflux disease)     Hyperlipidemia     Hypertension     Kidney stone     Meniere disease     Pulmonary embolism (HCC)     Thoracic aortic aneurysm (HCC)

## 2025-07-16 ENCOUNTER — APPOINTMENT (INPATIENT)
Dept: CT IMAGING | Facility: HOSPITAL | Age: 70
DRG: 074 | End: 2025-07-16
Payer: COMMERCIAL

## 2025-07-16 LAB
ANION GAP SERPL CALCULATED.3IONS-SCNC: 7 MMOL/L (ref 4–13)
BASOPHILS # BLD AUTO: 0.04 THOUSANDS/ÂΜL (ref 0–0.1)
BASOPHILS NFR BLD AUTO: 1 % (ref 0–1)
BUN SERPL-MCNC: 16 MG/DL (ref 5–25)
CALCIUM SERPL-MCNC: 8.8 MG/DL (ref 8.4–10.2)
CHLORIDE SERPL-SCNC: 108 MMOL/L (ref 96–108)
CO2 SERPL-SCNC: 24 MMOL/L (ref 21–32)
CREAT SERPL-MCNC: 1.04 MG/DL (ref 0.6–1.3)
EOSINOPHIL # BLD AUTO: 0.22 THOUSAND/ÂΜL (ref 0–0.61)
EOSINOPHIL NFR BLD AUTO: 3 % (ref 0–6)
ERYTHROCYTE [DISTWIDTH] IN BLOOD BY AUTOMATED COUNT: 13.9 % (ref 11.6–15.1)
GFR SERPL CREATININE-BSD FRML MDRD: 72 ML/MIN/1.73SQ M
GLUCOSE SERPL-MCNC: 114 MG/DL (ref 65–140)
HCT VFR BLD AUTO: 42.2 % (ref 36.5–49.3)
HGB BLD-MCNC: 14.6 G/DL (ref 12–17)
IMM GRANULOCYTES # BLD AUTO: 0.02 THOUSAND/UL (ref 0–0.2)
IMM GRANULOCYTES NFR BLD AUTO: 0 % (ref 0–2)
INR PPP: 1.1 (ref 0.85–1.19)
LYMPHOCYTES # BLD AUTO: 1.66 THOUSANDS/ÂΜL (ref 0.6–4.47)
LYMPHOCYTES NFR BLD AUTO: 25 % (ref 14–44)
MCH RBC QN AUTO: 31.9 PG (ref 26.8–34.3)
MCHC RBC AUTO-ENTMCNC: 34.6 G/DL (ref 31.4–37.4)
MCV RBC AUTO: 92 FL (ref 82–98)
MONOCYTES # BLD AUTO: 0.55 THOUSAND/ÂΜL (ref 0.17–1.22)
MONOCYTES NFR BLD AUTO: 8 % (ref 4–12)
NEUTROPHILS # BLD AUTO: 4.12 THOUSANDS/ÂΜL (ref 1.85–7.62)
NEUTS SEG NFR BLD AUTO: 63 % (ref 43–75)
NRBC BLD AUTO-RTO: 0 /100 WBCS
PLATELET # BLD AUTO: 184 THOUSANDS/UL (ref 149–390)
PMV BLD AUTO: 10 FL (ref 8.9–12.7)
POTASSIUM SERPL-SCNC: 3.5 MMOL/L (ref 3.5–5.3)
PROTHROMBIN TIME: 14.8 SECONDS (ref 12.3–15)
RBC # BLD AUTO: 4.57 MILLION/UL (ref 3.88–5.62)
SODIUM SERPL-SCNC: 139 MMOL/L (ref 135–147)
WBC # BLD AUTO: 6.61 THOUSAND/UL (ref 4.31–10.16)

## 2025-07-16 PROCEDURE — 70450 CT HEAD/BRAIN W/O DYE: CPT

## 2025-07-16 PROCEDURE — 80048 BASIC METABOLIC PNL TOTAL CA: CPT | Performed by: FAMILY MEDICINE

## 2025-07-16 PROCEDURE — 99232 SBSQ HOSP IP/OBS MODERATE 35: CPT | Performed by: FAMILY MEDICINE

## 2025-07-16 PROCEDURE — 85025 COMPLETE CBC W/AUTO DIFF WBC: CPT | Performed by: FAMILY MEDICINE

## 2025-07-16 PROCEDURE — 99222 1ST HOSP IP/OBS MODERATE 55: CPT | Performed by: STUDENT IN AN ORGANIZED HEALTH CARE EDUCATION/TRAINING PROGRAM

## 2025-07-16 PROCEDURE — 85610 PROTHROMBIN TIME: CPT | Performed by: FAMILY MEDICINE

## 2025-07-16 RX ORDER — ACETAMINOPHEN 325 MG/1
650 TABLET ORAL EVERY 6 HOURS PRN
Status: DISCONTINUED | OUTPATIENT
Start: 2025-07-16 | End: 2025-07-17 | Stop reason: HOSPADM

## 2025-07-16 RX ORDER — POTASSIUM CHLORIDE 1500 MG/1
20 TABLET, EXTENDED RELEASE ORAL 2 TIMES DAILY WITH MEALS
Status: DISCONTINUED | OUTPATIENT
Start: 2025-07-16 | End: 2025-07-17 | Stop reason: HOSPADM

## 2025-07-16 RX ORDER — AMOXICILLIN 250 MG
1 CAPSULE ORAL 2 TIMES DAILY
Status: DISCONTINUED | OUTPATIENT
Start: 2025-07-16 | End: 2025-07-16

## 2025-07-16 RX ORDER — AMOXICILLIN 250 MG
1 CAPSULE ORAL
Status: DISCONTINUED | OUTPATIENT
Start: 2025-07-16 | End: 2025-07-17 | Stop reason: HOSPADM

## 2025-07-16 RX ADMIN — POTASSIUM CHLORIDE 20 MEQ: 1500 TABLET, EXTENDED RELEASE ORAL at 08:14

## 2025-07-16 RX ADMIN — CEFTRIAXONE 2000 MG: 2 INJECTION, SOLUTION INTRAVENOUS at 16:28

## 2025-07-16 RX ADMIN — WARFARIN SODIUM 5 MG: 5 TABLET ORAL at 17:04

## 2025-07-16 RX ADMIN — POTASSIUM CHLORIDE 20 MEQ: 1500 TABLET, EXTENDED RELEASE ORAL at 17:04

## 2025-07-16 RX ADMIN — PANTOPRAZOLE SODIUM 20 MG: 20 TABLET, DELAYED RELEASE ORAL at 04:04

## 2025-07-16 RX ADMIN — SENNOSIDES AND DOCUSATE SODIUM 1 TABLET: 8.6; 5 TABLET ORAL at 21:37

## 2025-07-16 RX ADMIN — LOSARTAN POTASSIUM 50 MG: 50 TABLET, FILM COATED ORAL at 08:14

## 2025-07-16 NOTE — ASSESSMENT & PLAN NOTE
"70 y.o. right handed male with HTN, HLD, unprovoked PE September 2024 on Coumadin, s/p IVC filter, Meniere's disease, b/l carpal tunnel syndrome, who presented to [facility] on 7/14/25 with intermittent paresthesias in b/l hands after a tick bit.    Workup  - CTH wo contrast 7/16;  \"No acute intracranial abnormality given limitations of beam-hardening streak artifact from right cochlear implant limiting evaluation in some parts of right right cerebral hemisphere (worse in right parietal lobe).\"  - Labs:  - Serum: lyme total antibody wnl  - CSF (LP 7/15): lyme PCR pending, WBC 1, CMV pending, glucose 68, gram stain with no polys or bacteria seen    With pt's symptoms fluctuating, not constant, this argues against Lyme polyradicular neuritis. No loss of reflexes arguing against Lyme polyradicular neuritis. Likely carpal tunnel.     Plan  -Recommend wrsit splints at night.   - If symptoms continue and/or worsen, could consider repeat EMG as outpatient  - Medical management and correction of metabolic and infectious disturbances per primary team   - Avoid CNS altering medications if possible  - Continue supportive care   - Continue to monitor neurologic status. Notify neurology with any changes in exam.         "

## 2025-07-16 NOTE — CONSULTS
"Consultation - Neurology   Name: Danilo Padilla Jr. 70 y.o. male I MRN: [id number]  Unit/Bed#: -01 I Date of Admission: 7/14/2025   Date of Service: 7/16/2025 I Hospital Day: 2   Inpatient consult to Neurology  Consult performed by: Lisa Anderson PA-C  Consult ordered by: Shantel Donovan MD        Physician Requesting Evaluation: Shantel Donovan MD   Reason for Evaluation / Principal Problem: Intermittent paresthesias of both hands.     Assessment & Plan  Paresthesia of both hands  70 y.o. right handed male with HTN, HLD, unprovoked PE September 2024 on Coumadin, s/p IVC filter, Meniere's disease, b/l carpal tunnel syndrome, who presented to [facility] on 7/14/25 with intermittent paresthesias in b/l hands after a tick bit.    Workup  - CTH wo contrast 7/16;  \"No acute intracranial abnormality given limitations of beam-hardening streak artifact from right cochlear implant limiting evaluation in some parts of right right cerebral hemisphere (worse in right parietal lobe).\"  - Labs:  - Serum: lyme total antibody wnl  - CSF (LP 7/15): lyme PCR pending, WBC 1, CMV pending, glucose 68, gram stain with no polys or bacteria seen    With pt's symptoms fluctuating, not constant, this argues against Lyme polyradicular neuritis. No loss of reflexes arguing against Lyme polyradicular neuritis. Likely carpal tunnel.     Plan  -Recommend wrsit splints at night.   - If symptoms continue and/or worsen, could consider repeat EMG as outpatient  - Medical management and correction of metabolic and infectious disturbances per primary team   - Avoid CNS altering medications if possible  - Continue supportive care   - Continue to monitor neurologic status. Notify neurology with any changes in exam.         **History and physical examination obtained by attending neurologist. AP in room as witness to history and physical examination obtained and acted solely as a scribe for attending neurologist. This AP did not provide any decision making " "for this encounter.**      Pt does not need outpatient follow-up with neurology at this time.     History of Present Illness   Danilo Padilla Jr. is a 70 y.o. right handed male with HTN, HLD, unprovoked PE September 2024 on Coumadin, s/p IVC filter, Meniere's disease, b/l carpal tunnel syndrome, who presented to [facility] on 7/14/25 with intermittent paresthesias in b/l hands after a tick bit.      Pt reports on Monday, 7/7, he pulled out a tick. On 7/13, pt reports he was laying in bed, and his hands got \"all tingly.\" Pt reports tingling sensation went away after he moved his hands. Pt reports he presented to Urgent care on 7/14 due to the combination of the tick bit and intermittent tingling in his hands concerning him that he may have Lyme disease.Pt reports this intermittent tingling in his hands has not bothered him much since he has been in the hospital.     Prior to tick bite, pt reports he got numbness in hands about once a week due to carpal tunnel. Pt reports his hand tingling usually occurs at night when he is relaxing. Pt reports he swann not usually get tingling in his hands when active and moving. When resting, pt reports he could be in position just with arms relaxed on his lap and experience the tingling. Pt reports once moves hand, the tingling gets better. Pt reports tingling is typically more noticeable in L hand than R hand. Historically, this has always been the pattern.     Pt reports a tendorn in his L arm snapped. Pt reports he also has a \"tumor\" in L forearm. Pt reports he recently started an execise regimen involving utilizing a hand bike a few weeks ago. Pt has not undergone surgeries for carpal tunnel in past (Never got to point where hand tingling bothered him that much to prompt surgery). Pt reports he had an EMG many years ago that diagnosed his carpal tunnel syndrome. Other than episodic hand tingling recently, pt reports he has had no weakness, nor any other abnormal neurologic symptoms. "     Pt reports he has long standing balance issues due to being deaf, which has been about the same recently, possibly a little better since started exercise program. Pt denies fevers, chills, or night sweats since having the tick bite.      Pt reports no FH of significant neuro disease other than an aunt that  from brain tumor, another aunt that was found to have a brain tumor after her death, and his father having dementia.  Review of Systems   Constitutional:  Negative for chills and fever.   Neurological:  Negative for speech difficulty and weakness.        Historical Information   Past Medical History[1]  Past Surgical History[2]  Social History[3]  E-Cigarette/Vaping    E-Cigarette Use Never User      E-Cigarette/Vaping Substances    Nicotine No     THC No     CBD No     Flavoring No     Other No     Unknown No      Family History[4]  Social History[5]    Current Facility-Administered Medications:     acetaminophen (TYLENOL) tablet 650 mg, Q6H PRN    cefTRIAXone (ROCEPHIN) IVPB (premix in dextrose) 2,000 mg 50 mL, Q24H, Last Rate: 2,000 mg (07/15/25 1657)    [Held by provider] chlorthalidone tablet 12.5 mg, Daily    losartan (COZAAR) tablet 50 mg, Daily    pantoprazole (PROTONIX) EC tablet 20 mg, Early Morning    potassium chloride (Klor-Con M20) CR tablet 20 mEq, BID With Meals    senna-docusate sodium (SENOKOT S) 8.6-50 mg per tablet 1 tablet, HS    warfarin (COUMADIN) tablet 5 mg, Daily (warfarin)  Prior to Admission Medications   Prescriptions Last Dose Informant Patient Reported? Taking?   Multiple Vitamin (multivitamin) tablet  Self Yes No   Sig: Take 1 tablet by mouth in the morning.   Nutritional Supplements (THERALITH XR) TABS  Self Yes No   Sig: Take by mouth in the morning and in the evening. 2 tablets BID .   chlorthalidone 25 mg tablet 2025 Self No Yes   Sig: TAKE 1/2 TABLET BY MOUTH EVERY DAY   ezetimibe (ZETIA) 10 mg tablet  Self Yes No   Sig: Take 10 mg by mouth every other day    losartan (COZAAR) 50 mg tablet 7/13/2025 Self No Yes   Sig: TAKE 1 TABLET BY MOUTH EVERY DAY   omeprazole (PriLOSEC) 20 mg delayed release capsule Past Month Self Yes Yes   Sig: Take 20 mg by mouth in the morning.   potassium chloride (Klor-Con M20) 20 mEq tablet 7/14/2025 Self No Yes   Sig: TAKE 1 TABLET BY MOUTH EVERY DAY   warfarin (COUMADIN) 5 mg tablet 7/13/2025 Self Yes Yes      Facility-Administered Medications: None     Moxifloxacin, Alcohol - food allergy, Caffeine - food allergy, Cephalosporins, Doxycycline, Penicillins, Pravastatin, Prednisone, Tamiflu [oseltamivir], and Claritin [loratadine]    Objective :  Temp:  [97.6 °F (36.4 °C)-98.5 °F (36.9 °C)] 97.6 °F (36.4 °C)  HR:  [65-74] 65  BP: (128-147)/(77-85) 129/83  Resp:  [16-17] 16  SpO2:  [93 %-94 %] 93 %  O2 Device: None (Room air)    Physical Exam  Vitals and nursing note reviewed.     Eyes:      Extraocular Movements: Extraocular movements intact.       Cardiovascular:      Rate and Rhythm: Normal rate.   Pulmonary:      Effort: Pulmonary effort is normal.     Neurological:      Mental Status: He is alert.      Cranial Nerves: No dysarthria.     Neurological Exam  Mental Status  Alert. no dysarthria present. Able to name objects and repeat. Follows three-step commands. Able to follow cross-body commands. . Language: Speech fluent. Thought content appropriate . Attention and concentration: Appropriately attends to provider .  - oriented to self  - oriented to place  - oriented to month, year  - able to identify motion of hitch-hiking   .    Cranial Nerves  CN II: Right visual acuity: Counts fingers. Left visual acuity: Counts fingers. Right normal visual field. Left normal visual field.  CN III, IV, VI: Extraocular movements intact bilaterally.  CN V: Facial sensation is normal.  CN VII: Full and symmetric facial movement.  CN IX, X:  Right: Palate is normal.  CN XI:  Right: Sternocleidomastoid strength is normal. Trapezius strength is  normal.  Left: Sternocleidomastoid strength is normal. Trapezius strength is normal.  CN XII: Tongue midline without atrophy or fasciculations.    Motor   No pronator drift.                                             Right                     Left   Shoulder abduction               4                          5  Elbow flexion                         5                          5  Elbow extension                    5                          5  Finger flexion                         5                          5  Hip flexion                              5                          5  Plantarflexion                         5                          5  Dorsiflexion                            5                          5  - per pt, R shoulder weakness has been on-going for multiple years .    Sensory  Light touch is normal in upper and lower extremities. Pinprick is normal in upper and lower extremities. Vibration is normal in upper and lower extremities.     - extinction absent   - Finkelstein's test provoking paresthesias in middle and pointer finger on L hand, nothing on R hand; L ahnd paresthesias resolve with movement  - Tinel test negative b/l .    Reflexes  Right Plantar: downgoing  Left Plantar: equivocal    Right pathological reflexes: Ankle clonus absent.  Left pathological reflexes: Ankle clonus absent.    - UE reflexes 2 throughout   - Krishnan's positive b/l at fingers. .    Coordination  Right: Finger-to-nose normal. Heel-to-shin normal.Left: Finger-to-nose normal. Heel-to-shin normal.        Lab Results: attending neurologist reviewed the following results:CBC:   Results from last 7 days   Lab Units 07/16/25  0412 07/15/25  0336 07/14/25  1144   WBC Thousand/uL 6.61 6.73 5.80   RBC Million/uL 4.57 4.78 4.56   HEMOGLOBIN g/dL 14.6 15.1 14.6   HEMATOCRIT % 42.2 44.5 42.4   MCV fL 92 93 93   PLATELETS Thousands/uL 184 203 203   , BMP/CMP:   Results from last 7 days   Lab Units 07/16/25  0412 07/15/25  0336  "07/14/25  1144   SODIUM mmol/L 139 140 139   POTASSIUM mmol/L 3.5 3.5 3.5   CHLORIDE mmol/L 108 107 106   CO2 mmol/L 24 27 28   BUN mg/dL 16 13 13   CREATININE mg/dL 1.04 0.97 1.04   CALCIUM mg/dL 8.8 9.2 9.2   AST U/L  --  23 21   ALT U/L  --  21 21   ALK PHOS U/L  --  65 71   EGFR ml/min/1.73sq m 72 78 72   , Vitamin B12:   , HgBA1C:   , TSH:   , Coagulation:   Results from last 7 days   Lab Units 07/16/25  0412   INR  1.10   , Lipid Profile:   , Ammonia:   , Urinalysis:       Invalid input(s): \"URIBILINOGEN\", Drug Screen:   , Medication Drug Levels:       Invalid input(s): \"CARBAMAZEPINE\", \"OXCARBAZEPINE\"  Recent Labs     07/14/25  1144 07/15/25  0336 07/16/25  0412   WBC 5.80   < > 6.61   HGB 14.6   < > 14.6   HCT 42.4   < > 42.2      < > 184   SODIUM 139   < > 139   K 3.5   < > 3.5      < > 108   CO2 28   < > 24   BUN 13   < > 16   CREATININE 1.04   < > 1.04   GLUC 100   < > 114   MG 2.0  --   --     < > = values in this interval not displayed.     Attending neurologist personally reviewed CTH wo contrast 7/16/25 in [facility] and reviewed the associated reports.       VTE Prophylaxis: Warfarin (Coumadin)    This note was completed in part utilizing Dragon Software.  Grammatical errors, random word insertions, spelling mistakes, and incomplete sentences may be an occasional consequence of this system secondary to software limitations, ambient noise, and hardware issues.  If you have any questions or concerns about the content, text, or information contained within the body of this dictation, please contact the provider for clarification.              [1]   Past Medical History:  Diagnosis Date    GERD (gastroesophageal reflux disease)     Hyperlipidemia     Hypertension     Kidney stone     Meniere disease     Pulmonary embolism (HCC)     Thoracic aortic aneurysm (HCC)    [2]   Past Surgical History:  Procedure Laterality Date    ABDOMINAL SURGERY      BACK SURGERY      COCHLEAR IMPLANT Right     " COLONOSCOPY      CT NEEDLE BIOPSY MEDIASTINUM  5/17/2019    FL INJECTION LEFT SHOULDER (ARTHROGRAM)  01/19/2022    FL LUMBAR PUNCTURE DIAGNOSTIC  7/15/2025    HERNIA REPAIR      IVC FILTER INSERTION     [3]   Social History  Tobacco Use    Smoking status: Never     Passive exposure: Never    Smokeless tobacco: Never   Vaping Use    Vaping status: Never Used   Substance and Sexual Activity    Alcohol use: Never    Drug use: No   [4]   Family History  Problem Relation Name Age of Onset    Hypertension Mother      Hyperlipidemia Mother      Hypertension Father      Hyperlipidemia Father     [5]   Social History  Tobacco Use    Smoking status: Never     Passive exposure: Never    Smokeless tobacco: Never   Vaping Use    Vaping status: Never Used   Substance and Sexual Activity    Alcohol use: Never    Drug use: No

## 2025-07-16 NOTE — ASSESSMENT & PLAN NOTE
1.Paresthesias of both hands: Pt presented with c/o intermittent tingling of hands, malaise, fatigue, and muscle spasms of forearms x 1 week. He also noted tingling and cramps in the back of his legs. Pt reports tick bite one week PTA - ?dog tick.     He was [place] [facility] [place]7 with tick attached to the skin of his right upper arm. Pt states that he noticed it at 8 AM today. He checked his skin before bathing on 7/6 and it was not there. He was unable to remove it at home. It was not engorged. He had  just returned from [facility] to [facility]. The tick was removed at [facility] but he was not started on Doxycycline because he reported that he was allergic to this med    On admission, he did not have fever and WBC count was 5.8K. His bilat arm numbness had resolved. He reports that his arms felt like they were asleep before he came to the ER. No imaging studies were done in the ER. He was started on Rocephin for possibility of Lyme meningitis. He underwent LP by IR this afternoon. Spinal fluid studies are pending. Lyme titer is neg which could indicate that he does not have Lyme disease or he has early Lyme disease although he reports a larger tick was removed which would be more c/w dog tick which does not transmit Lyme disease. Blood parasite smear is neg. CT of neck has been ordered    - Cont Rocephin for now  - Awaiting results of LP  - Would do other studies such as neck CT and head CT to look for another etiology for his current neurologic sx's  - If sx's recur, would consult Neuro for further eval  - Awaiting results of other serologies for tick borne illnesses  - Cont tx of HTN and HLD as per Hospitalist service  - Will monitor for the development of toxicity to current abx tx since Rocephin can cause rash and C diff diarrhea

## 2025-07-16 NOTE — PROGRESS NOTES
"Progress Note - Hospitalist   Name: Danilo Padilla JrElicia 70 y.o. male I MRN: [id number]  Unit/Bed#: -[facility] I Date of Admission: 7/14/2025   Date of Service: 7/16/2025 I Hospital Day: 2    Assessment & Plan  Paresthesia of both hands  Has been having intermittent tingling for the last week, but since yesterday he felt like arms \"fell asleep\" to the forearm area, improved at the time of evaluation, he also has some chills and abdominal discomfort  Afebrile, no leukocytosis  Reports removing a tick over a week ago  ED contacted ID, advised lab workup including Anaplasma and Babesia/malaria as well as LP with Lyme PCR  Started on ceftriaxone, aware about listed allergy, but patient never had it and allergy listed as hyperventilation, discussed with ID, will start cautiously and observe for any reaction  Plan for LP puncture, needs INR to be less than 1.5,2.33 on admission, received 5 mg IV vitamin K, rechecked INR- 1.41  Resumed warfarin at 5 mg daily on 7/16  Monitor fever, leukocytosis  ID consulted, LP results so far unrevealing  CT of the neck ordered-showed moderate and severe foraminal stenosis at different levels, he is not able to have MRI due to cochlear implant, advised outpatient follow-up with neurosurgery  CT head pending to evaluate for potential contribution to his symptoms  Neurology consult    Personal history of pulmonary embolism  History of PE in his 40s, but then small PE again around 6 months  Currently on warfarin 5 mg daily  Reversing warfarin tonight with 5 mg IV vitamin K for lumbar puncture on 7/15  Resume warfarin the day after IR  Meniere's disease, unspecified ear  On HCTZ, but reports taking every other day, will hold for now in settings of n.p.o. after midnight  Sensorineural hearing loss of both ears  Due to Ménière's disease, cochlear implants in place  Hyperlipidemia  Currently not on statins  Hypertension  On losartan 50 mg daily and HCTZ 12.5 mg every other day  Continue losartan, hold " HCTZ    VTE Pharmacologic Prophylaxis: VTE Score: 6 High Risk (Score >/= 5) - Pharmacological DVT Prophylaxis Ordered: warfarin (Coumadin). Sequential Compression Devices Ordered.  Warfarin currently on hold for LP    Mobility:   Basic Mobility Inpatient Raw Score: 24  JH-HLM Goal: 8: Walk 250 feet or more  JH-HLM Achieved: 7: Walk 25 feet or more  JH-HLM Goal NOT achieved. Continue with multidisciplinary rounding and encourage appropriate mobility to improve upon JH-HLM goals.    Patient Centered Rounds: I performed bedside rounds with nursing staff today.   Discussions with Specialists or Other Care Team Provider: ID    Education and Discussions with Family / Patient: Updated  (brother) at bedside on 7/15.    Current Length of Stay: 2 day(s)  Current Patient Status: Inpatient   Certification Statement: The patient will continue to require additional inpatient hospital stay due to LP and infectious workup  Discharge Plan: Anticipate discharge in 24-48 hrs to home.    Code Status: Level 1 - Full Code    Subjective   Patient seen and examined, still reports bilateral tingling in his hands that is intermittent, but improved    Objective :  Temp:  [97.6 °F (36.4 °C)-98.2 °F (36.8 °C)] 98.2 °F (36.8 °C)  HR:  [64-74] 64  BP: (122-143)/(65-85) 122/65  Resp:  [16-17] 16  SpO2:  [93 %-95 %] 95 %  O2 Device: None (Room air)    Body mass index is 34.24 kg/m².     Input and Output Summary (last 24 hours):     Intake/Output Summary (Last 24 hours) at 7/16/2025 1638  Last data filed at 7/16/2025 1539  Gross per 24 hour   Intake 1130 ml   Output 150 ml   Net 980 ml       Physical Exam  Constitutional:       General: He is not in acute distress.     Appearance: He is well-developed.   HENT:      Head: Normocephalic and atraumatic.      Right Ear: Decreased hearing noted.      Left Ear: Decreased hearing noted.     Eyes:      General: No scleral icterus.     Pupils: Pupils are equal, round, and reactive to light.        Cardiovascular:      Rate and Rhythm: Normal rate and regular rhythm.      Heart sounds: Normal heart sounds. No murmur heard.     No friction rub. No gallop.   Pulmonary:      Effort: Pulmonary effort is normal. No respiratory distress.      Breath sounds: Normal breath sounds. No wheezing or rales.   Chest:      Chest wall: No tenderness.   Abdominal:      General: Bowel sounds are normal. There is no distension.      Palpations: Abdomen is soft.      Tenderness: There is no abdominal tenderness. There is no rebound.     Musculoskeletal:         General: No tenderness or deformity. Normal range of motion.      Cervical back: Normal range of motion and neck supple.   Lymphadenopathy:      Cervical: No cervical adenopathy.     Skin:     General: Skin is warm and dry.      Coloration: Skin is not pale.      Findings: No erythema or rash.     Neurological:      Mental Status: He is alert and oriented to person, place, and time.           Lines/Drains:        Telemetry:  Telemetry Orders (From admission, onward)               24 Hour Telemetry Monitoring  Continuous x 24 Hours (Telem)        Expiring   Question:  Reason for 24 Hour Telemetry  Answer:  Metabolic/electrolyte disturbance with high probability of dysrhythmia. K level <3 or >6 OR KCL infusion >10mEq/hr                              Lab Results: I have reviewed the following results:   Results from last 7 days   Lab Units 07/16/25  0412   WBC Thousand/uL 6.61   HEMOGLOBIN g/dL 14.6   HEMATOCRIT % 42.2   PLATELETS Thousands/uL 184   SEGS PCT % 63   LYMPHO PCT % 25   MONO PCT % 8   EOS PCT % 3     Results from last 7 days   Lab Units 07/16/25  0412 07/15/25  0336   SODIUM mmol/L 139 140   POTASSIUM mmol/L 3.5 3.5   CHLORIDE mmol/L 108 107   CO2 mmol/L 24 27   BUN mg/dL 16 13   CREATININE mg/dL 1.04 0.97   ANION GAP mmol/L 7 6   CALCIUM mg/dL 8.8 9.2   ALBUMIN g/dL  --  3.9   TOTAL BILIRUBIN mg/dL  --  0.86   ALK PHOS U/L  --  65   ALT U/L  --  21   AST  U/L  --  23   GLUCOSE RANDOM mg/dL 114 102     Results from last 7 days   Lab Units 07/16/25  0412   INR  1.10                   Recent Cultures (last 7 days):   Results from last 7 days   Lab Units 07/15/25  1526   GRAM STAIN RESULT  1+ Mononuclear Cells  No Polys or Bacteria seen             Last 24 Hours Medication List:     Current Facility-Administered Medications:     acetaminophen (TYLENOL) tablet 650 mg, Q6H PRN    cefTRIAXone (ROCEPHIN) IVPB (premix in dextrose) 2,000 mg 50 mL, Q24H, Last Rate: 2,000 mg (07/16/25 1628)    [Held by provider] chlorthalidone tablet 12.5 mg, Daily    losartan (COZAAR) tablet 50 mg, Daily    pantoprazole (PROTONIX) EC tablet 20 mg, Early Morning    potassium chloride (Klor-Con M20) CR tablet 20 mEq, BID With Meals    senna-docusate sodium (SENOKOT S) 8.6-50 mg per tablet 1 tablet, HS    warfarin (COUMADIN) tablet 5 mg, Daily (warfarin)    Administrative Statements   Today, Patient Was Seen By: Shantel Donovan MD      **Please Note: This note may have been constructed using a voice recognition system.**

## 2025-07-16 NOTE — ASSESSMENT & PLAN NOTE
"Has been having intermittent tingling for the last week, but since yesterday he felt like arms \"fell asleep\" to the forearm area, improved at the time of evaluation, he also has some chills and abdominal discomfort  Afebrile, no leukocytosis  Reports removing a tick over a week ago  ED contacted ID, advised lab workup including Anaplasma and Babesia/malaria as well as LP with Lyme PCR  Started on ceftriaxone, aware about listed allergy, but patient never had it and allergy listed as hyperventilation, discussed with ID, will start cautiously and observe for any reaction  Plan for LP puncture, needs INR to be less than 1.5,2.33 on admission, received 5 mg IV vitamin K, rechecked INR- 1.41  Resumed warfarin at 5 mg daily on 7/16  Monitor fever, leukocytosis  ID consulted, LP results so far unrevealing  CT of the neck ordered-showed moderate and severe foraminal stenosis at different levels, he is not able to have MRI due to cochlear implant, advised outpatient follow-up with neurosurgery  CT head pending to evaluate for potential contribution to his symptoms  Neurology consult    "

## 2025-07-16 NOTE — ASSESSMENT & PLAN NOTE
1.Paresthesias of both hands: Pt presented with c/o intermittent tingling of hands, malaise, fatigue, and muscle spasms of forearms x 1 week. He also noted tingling and cramps in the back of his legs. Pt reports tick bite one week PTA - ?dog tick.     He was seen in [facility] on 7/7 with tick attached to the skin of his right upper arm. Pt states that he noticed it at 8 AM today. He checked his skin before bathing on 7/6 and it was not there. He was unable to remove it at home. It was not engorged. He had  just returned from [facility] to [facility]. The tick was removed at [facility] but he was not started on Doxycycline because he reported that he was allergic to this med    On admission, he did not have fever and WBC count was 5.8K. His bilat arm numbness had resolved. He reports that his arms felt like they were asleep before he came to the ER. No imaging studies were done in the ER. He was started on Rocephin for possibility of Lyme meningitis. He underwent LP by IR this afternoon. Spinal fluid studies are pending. Lyme titer is neg which could indicate that he does not have Lyme disease or he has early Lyme disease although he reports a larger tick was removed which would be more c/w dog tick which does not transmit Lyme disease. Blood parasite smear is neg. CT of neck has been ordered    - Cont Rocephin for now  - Awaiting results of LP  - Would do other studies such as neck CT and head CT to look for another etiology for his current neurologic sx's  - If sx's recur, would consult Neuro for further eval  - Awaiting results of other serologies for tick borne illnesses  - Cont tx of HTN and HLD as per Hospitalist service  - Will monitor for the development of toxicity to current abx tx since Rocephin can cause rash and C diff diarrhea

## 2025-07-16 NOTE — CASE MANAGEMENT
Case Management Assessment & Discharge Planning Note    Patient name Danilo Padilla Jr.  Location /-01 MRN [id number]  : 1955 Date 2025       Current Admission Date: 2025  Current Admission Diagnosis:Paresthesia of both hands   Patient Active Problem List    Diagnosis Date Noted    Paresthesia of both hands 2025    Hordeolum externum of right upper eyelid 2024    Hypokalemia 2024    Acute pulmonary embolism without acute cor pulmonale (HCC) 2024    Kidney stones 2024    Contusion of rib on right side 02/10/2024    Viral infection 2023    GERD (gastroesophageal reflux disease)     Hyperlipidemia     Hypertension     Nontraumatic complete tear of left rotator cuff 12/15/2021    Cochlear implant in place 2021    Shoulder impingement syndrome, right 2020    Bursitis of left knee 2019    Pes anserinus bursitis of left knee 2019    Meniere's disease, unspecified ear 10/27/2016    Personal history of pulmonary embolism 10/27/2016    Sensorineural hearing loss of both ears 2016      LOS (days): 2  Geometric Mean LOS (GMLOS) (days): 2.2  Days to GMLOS:0.2     OBJECTIVE:    Risk of Unplanned Readmission Score: 11.14         Current admission status: Inpatient       Preferred Pharmacy:   [id number] [place] - [address]  [address]  [place] [address]  Phone: [phone] Fax: [phone]    Primary Care Provider: Kimberly Marcos DO    Primary Insurance: BL[employer] CR[employer] RE[employer]  Secondary Insurance:     ASSESSMENT:  Active Health Care Proxies       Clay Padilla Health Care Representative - Brother   Primary Phone: [phone] (Home)                 Advance Directives  Does patient have a Health Care POA?: Yes  Does patient have Advance Directives?: Yes  Advance Directives: Power of  for health care  Primary Contact: Clay Padilla         Readmission Root Cause  30 Day Readmission:  No    Patient Information  Admitted from:: Home  Mental Status: Alert  During Assessment patient was accompanied by: Not accompanied during assessment  Assessment information provided by:: Patient  Primary Caregiver: Self  Support Systems: Family members, Self  [facility] of Residence: [place]  What [facility] do you live in?: [place]  Home entry access options. Select all that apply.: No steps to enter home  Type of Current Residence: 2 story home  Upon entering residence, is there a bedroom on the main floor (no further steps)?: No  A bedroom is located on the following floor levels of residence (select all that apply):: 2nd Floor  Upon entering residence, is there a bathroom on the main floor (no further steps)?: Yes  Number of steps to 2nd floor from main floor: One Flight  Living Arrangements: Lives Alone  Is patient a ?: No    Activities of Daily Living Prior to Admission  Functional Status: Independent  Completes ADLs independently?: Yes  Ambulates independently?: Yes  Does patient use assisted devices?: No  Does patient currently own DME?: No  Does patient have a history of Outpatient Therapy (PT/OT)?: No  Does the patient have a history of Short-Term Rehab?: No  Does patient have a history of HHC?: No  Does patient currently have HHC?: No         Patient Information Continued  Income Source: Pension/custodial  Does patient have prescription coverage?: Yes  Can the patient afford their medications and any related supplies (such as glucometers or test strips)?: Yes  Does patient receive dialysis treatments?: No  Does patient have a history of substance abuse?: No  Does patient have a history of Mental Health Diagnosis?: No         Means of Transportation  Means of Transport to [facility]:: Drives Self          DISCHARGE DETAILS:    Discharge planning discussed with:: patient  Freedom of Choice: Yes  Comments - Freedom of Choice: discussed dc planning and role of CM  CM contacted family/caregiver?: No- see comments  (pt alert and indpt in room)  Were Treatment Team discharge recommendations reviewed with patient/caregiver?: Yes  Did patient/caregiver verbalize understanding of patient care needs?: Yes       Contacts  Reason/Outcome: Discharge Planning    Requested Home Health Care         Is the patient interested in HHC at discharge?: No    DME Referral Provided  Referral made for DME?: No    Other Referral/Resources/Interventions Provided:  Referral Comments: No needs anticipated. Plan for PO abx on dc.            Expected Discharge Disposition: Home or Self Care  Additional Discharge Dispositions: Home or Self Care  Transport at Discharge : Self                             IMM Given (Date):: 07/16/25  IMM Given to:: Patient     Additional Comments: Cm met with pt here for lyms disease testing and abx. Pt reports he lives alone in a [address] with no tomasz. He is indpt at baseline and remains so. He does not use or own DME. Pts POA is his brother and sees a PCP at [facility]. Pt uses [facility] pharmacy and he plans to drive himself home. No needs anticipated for dc

## 2025-07-16 NOTE — PLAN OF CARE
Problem: PAIN - ADULT  Goal: Verbalizes/displays adequate comfort level or baseline comfort level  Description: Interventions:  - Encourage patient to monitor pain and request assistance  - Assess pain using appropriate pain scale  - Administer analgesics as ordered based on type and severity of pain and evaluate response  - Implement non-pharmacological measures as appropriate and evaluate response  - Consider cultural and social influences on pain and pain management  - Notify physician/advanced practitioner if interventions unsuccessful or patient reports new pain  - Educate patient/family on pain management process including their role and importance of  reporting pain   - Provide non-pharmacologic/complimentary pain relief interventions  Outcome: Progressing     Problem: INFECTION - ADULT  Goal: Absence or prevention of progression during hospitalization  Description: INTERVENTIONS:  - Assess and monitor for signs and symptoms of infection  - Monitor lab/diagnostic results  - Monitor all insertion sites, i.e. indwelling lines, tubes, and drains  - Monitor endotracheal if appropriate and nasal secretions for changes in amount and color  - [place] appropriate cooling/warming therapies per order  - Administer medications as ordered  - Instruct and encourage patient and family to use good hand hygiene technique  - Identify and instruct in appropriate isolation precautions for identified infection/condition  Outcome: Progressing  Goal: Absence of fever/infection during neutropenic period  Description: INTERVENTIONS:  - Monitor WBC  - Perform strict hand hygiene  - Limit to healthy visitors only  - No plants, dried, fresh or silk flowers with foote in patient room  Outcome: Progressing     Problem: SAFETY ADULT  Goal: Patient will remain free of falls  Description: INTERVENTIONS:  - Educate patient/family on patient safety including physical limitations  - Instruct patient to call for assistance with activity   -  Consider consulting OT/PT to assist with strengthening/mobility based on AM PAC & JH-HLM score  - Consult OT/PT to assist with strengthening/mobility   - Keep Call bell within reach  - Keep bed low and locked with side rails adjusted as appropriate  - Keep care items and personal belongings within reach  - Initiate and maintain comfort rounds  - Make Fall Risk Sign visible to staff  - Offer Toileting every 2 Hours, in advance of need  - Initiate/Maintain bed/chair alarm  - Obtain necessary fall risk management equipment  - Apply yellow socks and bracelet for high fall risk patients  - Consider moving patient to room near nurses station  Outcome: Progressing  Goal: Maintain or return to baseline ADL function  Description: INTERVENTIONS:  -  Assess patient's ability to carry out ADLs; assess patient's baseline for ADL function and identify physical deficits which impact ability to perform ADLs (bathing, care of mouth/teeth, toileting, grooming, dressing, etc.)  - Assess/evaluate cause of self-care deficits   - Assess range of motion  - Assess patient's mobility; develop plan if impaired  - Assess patient's need for assistive devices and provide as appropriate  - Encourage maximum independence but intervene and supervise when necessary  - Involve family in performance of ADLs  - Assess for home care needs following discharge   - Consider OT consult to assist with ADL evaluation and planning for discharge  - Provide patient education as appropriate  - Monitor functional capacity and physical performance, use of AM PAC & JH-HLM   - Monitor gait, balance and fatigue with ambulation    Outcome: Progressing  Goal: Maintains/Returns to pre admission functional level  Description: INTERVENTIONS:  - Perform AM-PAC 6 Click Basic Mobility/ Daily Activity assessment daily.  - Set and communicate daily mobility goal to care team and patient/family/caregiver.   - Collaborate with rehabilitation services on mobility goals if  consulted  - Perform Range of Motion 3 times a day.  - Reposition patient every 2 hours.  - Dangle patient 3 times a day  - Stand patient 3 times a day  - Ambulate patient 3 times a day  - Out of bed to chair 3 times a day   - Out of bed for meals 3 times a day  - Out of bed for toileting  - Record patient progress and toleration of activity level   Outcome: Progressing     Problem: DISCHARGE PLANNING  Goal: Discharge to home or other facility with appropriate resources  Description: INTERVENTIONS:  - Identify barriers to discharge w/patient and caregiver  - Arrange for needed discharge resources and transportation as appropriate  - Identify discharge learning needs (meds, wound care, etc.)  - Arrange for interpretive services to assist at discharge as needed  - Refer to Case Management Department for coordinating discharge planning if the patient needs post-hospital services based on physician/advanced practitioner order or complex needs related to functional status, cognitive ability, or social support system  Outcome: Progressing     Problem: Knowledge Deficit  Goal: Patient/family/caregiver demonstrates understanding of disease process, treatment plan, medications, and discharge instructions  Description: Complete learning assessment and assess knowledge base.  Interventions:  - Provide teaching at level of understanding  - Provide teaching via preferred learning methods  Outcome: Progressing

## 2025-07-16 NOTE — ASSESSMENT & PLAN NOTE
1.Paresthesias of both hands: Pt presented with c/o intermittent tingling of hands, malaise, fatigue, and muscle spasms of forearms x 1 week. He also noted tingling and cramps in the back of his legs. Pt reports tick bite one week PTA - ?dog tick.     He was seen in [facility] on 7/7 with tick attached to the skin of his right upper arm. Pt states that he noticed it at 8 AM today. He checked his skin before bathing on 7/6 and it was not there. He was unable to remove it at home. It was not engorged. He had  just returned from [facility] to [facility]. The tick was removed at [facility] but he was not started on Doxycycline because he reported that he was allergic to this med    On admission, he did not have fever and WBC count was 5.8K. His bilat arm numbness had resolved. He reports that his arms felt like they were asleep before he came to the ER. No imaging studies were done in the ER. He was started on Rocephin for possibility of Lyme meningitis. He underwent LP by IR this afternoon. Spinal fluid studies are pending. Lyme titer is neg which could indicate that he does not have Lyme disease or he has early Lyme disease although he reports a larger tick was removed which would be more c/w dog tick which does not transmit Lyme disease. Blood parasite smear is neg. CT of neck has been ordered    - Cont Rocephin for now  - Awaiting results of LP  - Would do other studies such as neck CT and head CT to look for another etiology for his current neurologic sx's  - If sx's recur, would consult Neuro for further eval  - Awaiting results of other serologies for tick borne illnesses  - Cont tx of HTN and HLD as per Hospitalist service  - Will monitor for the development of toxicity to current abx tx since Rocephin can cause rash and C diff diarrhea

## 2025-07-17 VITALS
WEIGHT: 218.6 LBS | OXYGEN SATURATION: 98 % | TEMPERATURE: 98.3 F | DIASTOLIC BLOOD PRESSURE: 77 MMHG | BODY MASS INDEX: 34.31 KG/M2 | HEIGHT: 67 IN | RESPIRATION RATE: 17 BRPM | SYSTOLIC BLOOD PRESSURE: 126 MMHG | HEART RATE: 68 BPM

## 2025-07-17 PROBLEM — M48.02 CERVICAL STENOSIS OF SPINE: Status: ACTIVE | Noted: 2025-07-17

## 2025-07-17 LAB
ALBUMIN SERPL BCG-MCNC: 3.8 G/DL (ref 3.5–5)
ALP SERPL-CCNC: 66 U/L (ref 34–104)
ALT SERPL W P-5'-P-CCNC: 20 U/L (ref 7–52)
ANION GAP SERPL CALCULATED.3IONS-SCNC: 7 MMOL/L (ref 4–13)
AST SERPL W P-5'-P-CCNC: 19 U/L (ref 13–39)
BASOPHILS # BLD AUTO: 0.05 THOUSANDS/ÂΜL (ref 0–0.1)
BASOPHILS NFR BLD AUTO: 1 % (ref 0–1)
BILIRUB SERPL-MCNC: 0.61 MG/DL (ref 0.2–1)
BUN SERPL-MCNC: 20 MG/DL (ref 5–25)
CALCIUM SERPL-MCNC: 8.9 MG/DL (ref 8.4–10.2)
CHLORIDE SERPL-SCNC: 108 MMOL/L (ref 96–108)
CO2 SERPL-SCNC: 25 MMOL/L (ref 21–32)
CREAT SERPL-MCNC: 1.11 MG/DL (ref 0.6–1.3)
EOSINOPHIL # BLD AUTO: 0.23 THOUSAND/ÂΜL (ref 0–0.61)
EOSINOPHIL NFR BLD AUTO: 3 % (ref 0–6)
ERYTHROCYTE [DISTWIDTH] IN BLOOD BY AUTOMATED COUNT: 14.3 % (ref 11.6–15.1)
GFR SERPL CREATININE-BSD FRML MDRD: 66 ML/MIN/1.73SQ M
GLUCOSE SERPL-MCNC: 116 MG/DL (ref 65–140)
HCT VFR BLD AUTO: 41.8 % (ref 36.5–49.3)
HGB BLD-MCNC: 14.3 G/DL (ref 12–17)
IMM GRANULOCYTES # BLD AUTO: 0.01 THOUSAND/UL (ref 0–0.2)
IMM GRANULOCYTES NFR BLD AUTO: 0 % (ref 0–2)
INR PPP: 1.06 (ref 0.85–1.19)
LYMPHOCYTES # BLD AUTO: 1.93 THOUSANDS/ÂΜL (ref 0.6–4.47)
LYMPHOCYTES NFR BLD AUTO: 28 % (ref 14–44)
MCH RBC QN AUTO: 32.3 PG (ref 26.8–34.3)
MCHC RBC AUTO-ENTMCNC: 34.2 G/DL (ref 31.4–37.4)
MCV RBC AUTO: 94 FL (ref 82–98)
MONOCYTES # BLD AUTO: 0.54 THOUSAND/ÂΜL (ref 0.17–1.22)
MONOCYTES NFR BLD AUTO: 8 % (ref 4–12)
NEUTROPHILS # BLD AUTO: 4.07 THOUSANDS/ÂΜL (ref 1.85–7.62)
NEUTS SEG NFR BLD AUTO: 60 % (ref 43–75)
NRBC BLD AUTO-RTO: 0 /100 WBCS
PLATELET # BLD AUTO: 188 THOUSANDS/UL (ref 149–390)
PMV BLD AUTO: 10.3 FL (ref 8.9–12.7)
POTASSIUM SERPL-SCNC: 3.6 MMOL/L (ref 3.5–5.3)
PROT CSF-MCNC: 59 MG/DL (ref 15–45)
PROT SERPL-MCNC: 6.7 G/DL (ref 6.4–8.4)
PROTHROMBIN TIME: 14.3 SECONDS (ref 12.3–15)
RBC # BLD AUTO: 4.43 MILLION/UL (ref 3.88–5.62)
SODIUM SERPL-SCNC: 140 MMOL/L (ref 135–147)
WBC # BLD AUTO: 6.83 THOUSAND/UL (ref 4.31–10.16)

## 2025-07-17 PROCEDURE — 85025 COMPLETE CBC W/AUTO DIFF WBC: CPT | Performed by: FAMILY MEDICINE

## 2025-07-17 PROCEDURE — 99239 HOSP IP/OBS DSCHRG MGMT >30: CPT | Performed by: FAMILY MEDICINE

## 2025-07-17 PROCEDURE — 80053 COMPREHEN METABOLIC PANEL: CPT | Performed by: FAMILY MEDICINE

## 2025-07-17 PROCEDURE — 85610 PROTHROMBIN TIME: CPT | Performed by: FAMILY MEDICINE

## 2025-07-17 RX ADMIN — PANTOPRAZOLE SODIUM 20 MG: 20 TABLET, DELAYED RELEASE ORAL at 03:50

## 2025-07-17 RX ADMIN — POTASSIUM CHLORIDE 20 MEQ: 1500 TABLET, EXTENDED RELEASE ORAL at 08:17

## 2025-07-17 RX ADMIN — LOSARTAN POTASSIUM 50 MG: 50 TABLET, FILM COATED ORAL at 08:17

## 2025-07-17 NOTE — DISCHARGE SUMMARY
"Discharge Summary - Hospitalist   Name: Danilo Padilla Jr. 70 y.o. male I MRN: [id number]  Unit/Bed#: -[facility] I Date of Admission: 7/14/2025   Date of Service: 7/17/2025 I Hospital Day: 3     Assessment & Plan  Paresthesia of both hands  Has been having intermittent tingling for the last week, but since yesterday he felt like arms \"fell asleep\" to the forearm area, improved at the time of evaluation, he also has some chills and abdominal discomfort  Afebrile, no leukocytosis  Reports removing a tick over a week ago  ED contacted ID, advised lab workup including Anaplasma and Babesia/malaria as well as LP with Lyme PCR  Started on ceftriaxone, aware about listed allergy, but patient never had it and allergy listed as hyperventilation, discussed with ID, will start cautiously and observe for any reaction  Plan for LP puncture, needs INR to be less than 1.5,2.33 on admission, received 5 mg IV vitamin K, rechecked INR- 1.41  Resumed warfarin at 5 mg daily on 7/16  Monitor fever, leukocytosis  ID consulted, LP results so far unrevealing  CT of the neck ordered-showed moderate and severe foraminal stenosis at different levels, he is not able to have MRI due to cochlear implant, advised outpatient follow-up with neurosurgery  CT head negative for acute pathology  Neurology consulted-consider outpatient evaluation for carpal tunnel syndrome  Discussed with ID, okay to discharge per hospitalist, follow-up outpatient for possible recheck of Lyme testing    Personal history of pulmonary embolism  History of PE in his 40s, but then small PE again around 6 months  on warfarin 5 mg daily  Reversed warfarin with 5 mg IV vitamin K for lumbar puncture on 7/15  Resumed warfarin 5 mg daily on 7/16  Advised to check INR in 3 to 4 days  Meniere's disease, unspecified ear  On HCTZ, but reports taking every other day, will hold for now in settings of n.p.o. after midnight  Sensorineural hearing loss of both ears  Due to Ménière's disease, " "cochlear implants in place  Hyperlipidemia  Currently not on statins  Hypertension  On losartan 50 mg daily and HCTZ 12.5 mg every other day  Continue losartan, hold HCTZ  Cervical stenosis of spine  CT neck showed \" Multilevel degenerative changes throughout the cervical spine. Moderate right foraminal stenosis at C3-4 secondary to facet and uncovertebral spurring. Moderate left foraminal stenosis at C4-5 secondary to facet and uncovertebral arthropathy. Potentially severe left foraminal stenosis at C5-6, C6-7 due to similar factors.\"  Outpatient follow-up with neurosurgery, referral placed     Medical Problems       Resolved Problems  Date Reviewed: 7/15/2025   None       Discharging Physician / Practitioner: Shantel Donovan MD  PCP: Kimberly Marcos DO  Admission Date:   Admission Orders (From admission, onward)       Ordered        07/14/25 1436  INPATIENT ADMISSION  Once                          Discharge Date: 07/17/25    Next Steps for Physician/AP Assuming Care:  Follow up with ID, neurology, neurosurgery    Test Results Pending at Discharge (will require follow up):  Lumbar puncture analysis    Medication Changes for Discharge & Rationale:   none  See after visit summary for reconciled discharge medications provided to patient and/or family.     Consultations During Hospital Stay:  ID, neurology    Procedures Performed:   Lumbar puncture    Significant Findings / Test Results:   Laboratory and Diagnostics  Results from last 7 days   Lab Units 07/17/25  0353 07/16/25  0412 07/15/25  0336 07/14/25  1144   WBC Thousand/uL 6.83 6.61 6.73 5.80   HEMOGLOBIN g/dL 14.3 14.6 15.1 14.6   HEMATOCRIT % 41.8 42.2 44.5 42.4   PLATELETS Thousands/uL 188 184 203 203   SEGS PCT % 60 63 56 66   MONO PCT % 8 8 10 8   EOS PCT % 3 3 3 2     Results from last 7 days   Lab Units 07/17/25  0353 07/16/25  0412 07/15/25  0336 07/14/25  1144   SODIUM mmol/L 140 139 140 139   POTASSIUM mmol/L 3.6 3.5 3.5 3.5   CHLORIDE mmol/L 108 " 108 107 106   CO2 mmol/L 25 24 27 28   ANION GAP mmol/L 7 7 6 5   BUN mg/dL 20 16 13 13   CREATININE mg/dL 1.11 1.04 0.97 1.04   CALCIUM mg/dL 8.9 8.8 9.2 9.2   GLUCOSE RANDOM mg/dL 116 114 102 100   ALT U/L 20  --  21 21   AST U/L 19  --  23 21   ALK PHOS U/L 66  --  65 71   ALBUMIN g/dL 3.8  --  3.9 4.1   TOTAL BILIRUBIN mg/dL 0.61  --  0.86 0.79     Results from last 7 days   Lab Units 07/14/25  1144   MAGNESIUM mg/dL 2.0      Results from last 7 days   Lab Units 07/17/25  0353 07/16/25  0412 07/15/25  0336 07/14/25  1253   INR  1.06 1.10 1.41* 2.33*   PTT seconds  --   --   --  36*          Lab Results   Component Value Date    HSTNID2 -1 06/06/2025     Lab Results   Component Value Date    BNP 26 12/02/2023       Lab Results   Component Value Date    HGB 14.3 07/17/2025    HGB 14.6 07/16/2025    HGB 15.1 07/15/2025    HGB 14.6 07/14/2025    HGB 14.6 06/06/2025       Imaging and other studies: Results Review Statement: I reviewed radiology reports from this admission including: CT head and CT C-spine.  CT head wo contrast  Result Date: 7/16/2025  Impression: No acute intracranial abnormality given limitations of beam-hardening streak artifact from right cochlear implant limiting evaluation in some parts of right right cerebral hemisphere (worse in right parietal lobe). Workstation performed: [id number]     CT soft tissue neck w contrast  Result Date: 7/16/2025  Impression: 1.  No suspicious neck mass or cervical adenopathy. 2.  Cervical spondylosis with foraminal stenoses as above, recommend correlation with clinical exam. 3.  See comments above for full analysis. Workstation performed: [id number]     FL IN-patient lumbar puncture  Result Date: 7/15/2025  Impression: Successful fluoroscopically guided lumbar puncture. Workstation performed: [id number]     Incidental Findings:   none       Hospital Course:   Danilo Padilla Jr. is a 70 y.o. male patient who originally presented to the hospital on 7/14/2025 with  "symptoms including tingling in his hands and forearms, muscle spasms, fatigue, and chills following a tick bite. He reported indigestion and loose bowel movements but denied other acute symptoms.  Neuro Lyme , ID contacted by ED - advised lumbar puncture. Hiss arm numbness had resolved, but intermittent paresthesias persisted. A lumbar puncture was unrevealing during admission, but Lyme disease PCR and CMV PCR were still pending at the time of discharge. Carpal tunnel syndrome was also considered as possible cause vs cervical spine foraminal stenosis. He remained stable, cleared for discharge by ID and neurology.  Patient resumed his home regimen of warfarin, advised to follow-up with PCP on INR. He was recommended to follow-up with neurology and neurosurgery outpatient for above findings.       Please see above list of diagnoses and related plan for additional information.     Discharge Day Visit / Exam:   Subjective: Patient seen and examined, still has some tingling sensation in his hands but they are less severe  Vitals: Blood Pressure: 126/77 (07/17/25 0726)  Pulse: 68 (07/17/25 0726)  Temperature: 98.3 °F (36.8 °C) (07/17/25 0726)  Temp Source: Oral (07/15/25 1939)  Respirations: 17 (07/17/25 0726)  Height: 5' 7\" (170.2 cm) (07/14/25 2032)  Weight - Scale: 99.2 kg (218 lb 9.6 oz) (07/14/25 2032)  SpO2: 98 % (07/17/25 0726)  Physical Exam  Constitutional:       General: He is not in acute distress.     Appearance: He is well-developed.   HENT:      Head: Normocephalic and atraumatic.      Right Ear: Decreased hearing noted.      Left Ear: Decreased hearing noted.     Eyes:      General: No scleral icterus.     Pupils: Pupils are equal, round, and reactive to light.       Cardiovascular:      Rate and Rhythm: Normal rate and regular rhythm.      Heart sounds: Normal heart sounds. No murmur heard.     No friction rub. No gallop.   Pulmonary:      Effort: Pulmonary effort is normal. No respiratory distress.      " Breath sounds: Normal breath sounds. No wheezing or rales.   Chest:      Chest wall: No tenderness.   Abdominal:      General: Bowel sounds are normal. There is no distension.      Palpations: Abdomen is soft.      Tenderness: There is no abdominal tenderness. There is no rebound.     Musculoskeletal:         General: No tenderness or deformity. Normal range of motion.      Cervical back: Normal range of motion and neck supple.   Lymphadenopathy:      Cervical: No cervical adenopathy.     Skin:     General: Skin is warm and dry.      Coloration: Skin is not pale.      Findings: No erythema or rash.     Neurological:      Mental Status: He is alert and oriented to person, place, and time.          Discussion with Family: Patient declined call to .     Discharge instructions/Information to patient and family:   See after visit summary for information provided to patient and family.      Provisions for Follow-Up Care:  See after visit summary for information related to follow-up care and any pertinent home health orders.      Mobility at time of Discharge:   Basic Mobility Inpatient Raw Score: 24  JH-HLM Goal: 8: Walk 250 feet or more  JH-HLM Achieved: 7: Walk 25 feet or more  HLM Goal achieved. Continue to encourage appropriate mobility.     Disposition:   Home    Planned Readmission: no    Administrative Statements   Discharge Statement:  I have spent a total time of 60 minutes in caring for this patient on the day of the visit/encounter. >30 minutes of time was spent on: Prognosis, Risks and benefits of tx options, Instructions for management, Patient and family education, Importance of tx compliance, Risk factor reductions, Impressions, Counseling / Coordination of care, Documenting in the medical record, Reviewing / ordering tests, medicine, procedures  , and Communicating with other healthcare professionals .    **Please Note: This note may have been constructed using a voice recognition system**

## 2025-07-17 NOTE — ASSESSMENT & PLAN NOTE
"  1.Paresthesias of both hands: Pt presented with c/o intermittent tingling of hands, malaise, fatigue, and muscle spasms of forearms x 1 week. He also noted tingling and cramps in the back of his legs. Pt reports tick bite one week PTA - ?dog tick.     He was seen in [facility] on 7/7 with tick attached to the skin of his right upper arm. Pt states that he noticed it at 8 AM today. He checked his skin before bathing on 7/6 and it was not there. He was unable to remove it at home. It was not engorged. He had  just returned from [facility] to [facility]. The tick was removed at [facility] but he was not started on Doxycycline because he reported that he was allergic to this med    On admission, he did not have fever and WBC count was 5.8K. His bilat arm numbness had resolved. He reports that his arms felt like they were asleep before he came to the ER. No imaging studies were done in the ER. He was started on Rocephin for possibility of Lyme meningitis. He underwent LP by IR this afternoon. Spinal fluid studies are pending. Lyme titer is neg which could indicate that he does not have Lyme disease or he has early Lyme disease although he reports a larger tick was removed which would be more c/w dog tick which does not transmit Lyme disease. Blood parasite smear is neg. CT of neck has been ordered    7/16: No fever and WBC count is 6.6K today. He has occas paresthesias of his hands but not as severe. Lyme titer was neg which could indicate that the Pt does not have Lyme disease or he has early Lyme disease. Pt underwent LP on 7/15. CSF cell count showed only 1 WBC's (100% lymphs) and gluc of 68. CSF protein was not done. Pt does not have any evidence of Lyme meningitis. Pt also reports removal of larger \"wood\" tick and not deer tick which would make Lyme infxn unlikely from recent tick bite. CSF cx is neg so far. Agree with Neurology that CTS is the most likely etiology of his current presentation. Head CT and neck CT did not show any " etiology for his current sx's    - Will d/c Rocephin after today's dose  - Awaiting results of other serologies for tick borne illnesses but at this time, he does not appear to have an infxn  - Will cont to monitor for the development of toxicity to current abx tx  - If Pt conts to improve, OK to d/c Pt tomorrow off abx  - Pt will f/u with me an an out-Pt and if sx's persist, will repeat Lyme studies in 4 weeks to see if they become positive

## 2025-07-17 NOTE — ASSESSMENT & PLAN NOTE
"CT neck showed \" Multilevel degenerative changes throughout the cervical spine. Moderate right foraminal stenosis at C3-4 secondary to facet and uncovertebral spurring. Moderate left foraminal stenosis at C4-5 secondary to facet and uncovertebral arthropathy. Potentially severe left foraminal stenosis at C5-6, C6-7 due to similar factors.\"  Outpatient follow-up with neurosurgery, referral placed  "

## 2025-07-17 NOTE — ASSESSMENT & PLAN NOTE
"  1.Paresthesias of both hands: Pt presented with c/o intermittent tingling of hands, malaise, fatigue, and muscle spasms of forearms x 1 week. He also noted tingling and cramps in the back of his legs. Pt reports tick bite one week PTA - ?dog tick.     He was seen in [place] on 7/7 with tick attached to the skin of his right upper arm. Pt states that he noticed it at 8 AM today. He checked his skin before bathing on 7/6 and it was not there. He was unable to remove it at home. It was not engorged. He had  just returned from [facility] to [facility]. The tick was removed at [place] but he was not started on Doxycycline because he reported that he was allergic to this med    On admission, he did not have fever and WBC count was 5.8K. His bilat arm numbness had resolved. He reports that his arms felt like they were asleep before he came to the ER. No imaging studies were done in the ER. He was started on Rocephin for possibility of Lyme meningitis. He underwent LP by IR this afternoon. Spinal fluid studies are pending. Lyme titer is neg which could indicate that he does not have Lyme disease or he has early Lyme disease although he reports a larger tick was removed which would be more c/w dog tick which does not transmit Lyme disease. Blood parasite smear is neg. CT of neck has been ordered    7/16: No fever and WBC count is 6.6K today. He has occas paresthesias of his hands but not as severe. Lyme titer was neg which could indicate that the Pt does not have Lyme disease or he has early Lyme disease. Pt underwent LP on 7/15. CSF cell count showed only 1 WBC's (100% lymphs) and gluc of 68. CSF protein was not done. Pt does not have any evidence of Lyme meningitis. Pt also reports removal of larger \"wood\" tick and not deer tick which would make Lyme infxn unlikely from recent tick bite. CSF cx is neg so far. Agree with Neurology that CTS is the most likely etiology of his current presentation. Head CT and neck CT did not show any " etiology for his current sx's    - Will d/c Rocephin after today's dose  - Awaiting results of other serologies for tick borne illnesses but at this time, he does not appear to have an infxn  - Will cont to monitor for the development of toxicity to current abx tx  - If Pt conts to improve, OK to d/c Pt tomorrow off abx  - Pt will f/u with me an an out-Pt and if sx's persist, will repeat Lyme studies in 4 weeks to see if they become positive

## 2025-07-17 NOTE — PLAN OF CARE
Problem: PAIN - ADULT  Goal: Verbalizes/displays adequate comfort level or baseline comfort level  Description: Interventions:  - Encourage patient to monitor pain and request assistance  - Assess pain using appropriate pain scale  - Administer analgesics as ordered based on type and severity of pain and evaluate response  - Implement non-pharmacological measures as appropriate and evaluate response  - Consider cultural and social influences on pain and pain management  - Notify physician/advanced practitioner if interventions unsuccessful or patient reports new pain  - Educate patient/family on pain management process including their role and importance of  reporting pain   - Provide non-pharmacologic/complimentary pain relief interventions  Outcome: Progressing     Problem: INFECTION - ADULT  Goal: Absence or prevention of progression during hospitalization  Description: INTERVENTIONS:  - Assess and monitor for signs and symptoms of infection  - Monitor lab/diagnostic results  - Monitor all insertion sites, i.e. indwelling lines, tubes, and drains  - Monitor endotracheal if appropriate and nasal secretions for changes in amount and color  - [place] appropriate cooling/warming therapies per order  - Administer medications as ordered  - Instruct and encourage patient and family to use good hand hygiene technique  - Identify and instruct in appropriate isolation precautions for identified infection/condition  Outcome: Progressing  Goal: Absence of fever/infection during neutropenic period  Description: INTERVENTIONS:  - Monitor WBC  - Perform strict hand hygiene  - Limit to healthy visitors only  - No plants, dried, fresh or silk flowers with foote in patient room  Outcome: Progressing     Problem: SAFETY ADULT  Goal: Patient will remain free of falls  Description: INTERVENTIONS:  - Educate patient/family on patient safety including physical limitations  - Instruct patient to call for assistance with activity   -  Consider consulting OT/PT to assist with strengthening/mobility based on AM PAC & JH-HLM score  - Consult OT/PT to assist with strengthening/mobility   - Keep Call bell within reach  - Keep bed low and locked with side rails adjusted as appropriate  - Keep care items and personal belongings within reach  - Initiate and maintain comfort rounds  - Make Fall Risk Sign visible to staff  - Offer Toileting every 2 Hours, in advance of need  - Initiate/Maintain bed alarm  - Obtain necessary fall risk management equipment: socks  - Apply yellow socks and bracelet for high fall risk patients  - Consider moving patient to room near nurses station  Outcome: Progressing  Goal: Maintain or return to baseline ADL function  Description: INTERVENTIONS:  -  Assess patient's ability to carry out ADLs; assess patient's baseline for ADL function and identify physical deficits which impact ability to perform ADLs (bathing, care of mouth/teeth, toileting, grooming, dressing, etc.)  - Assess/evaluate cause of self-care deficits   - Assess range of motion  - Assess patient's mobility; develop plan if impaired  - Assess patient's need for assistive devices and provide as appropriate  - Encourage maximum independence but intervene and supervise when necessary  - Involve family in performance of ADLs  - Assess for home care needs following discharge   - Consider OT consult to assist with ADL evaluation and planning for discharge  - Provide patient education as appropriate  - Monitor functional capacity and physical performance, use of AM PAC & JH-HLM   - Monitor gait, balance and fatigue with ambulation    Outcome: Progressing  Goal: Maintains/Returns to pre admission functional level  Description: INTERVENTIONS:  - Perform AM-PAC 6 Click Basic Mobility/ Daily Activity assessment daily.  - Set and communicate daily mobility goal to care team and patient/family/caregiver.   - Collaborate with rehabilitation services on mobility goals if  consulted  - Perform Range of Motion 3 times a day.  - Reposition patient every 2 hours.  - Dangle patient 3 times a day  - Stand patient 3 times a day  - Ambulate patient 3 times a day  - Out of bed to chair 3 times a day   - Out of bed for meals 3 times a day  - Out of bed for toileting  - Record patient progress and toleration of activity level   Outcome: Progressing     Problem: DISCHARGE PLANNING  Goal: Discharge to home or other facility with appropriate resources  Description: INTERVENTIONS:  - Identify barriers to discharge w/patient and caregiver  - Arrange for needed discharge resources and transportation as appropriate  - Identify discharge learning needs (meds, wound care, etc.)  - Arrange for interpretive services to assist at discharge as needed  - Refer to Case Management Department for coordinating discharge planning if the patient needs post-hospital services based on physician/advanced practitioner order or complex needs related to functional status, cognitive ability, or social support system  Outcome: Progressing

## 2025-07-17 NOTE — PLAN OF CARE
Problem: PAIN - ADULT  Goal: Verbalizes/displays adequate comfort level or baseline comfort level  Description: Interventions:  - Encourage patient to monitor pain and request assistance  - Assess pain using appropriate pain scale  - Administer analgesics as ordered based on type and severity of pain and evaluate response  - Implement non-pharmacological measures as appropriate and evaluate response  - Consider cultural and social influences on pain and pain management  - Notify physician/advanced practitioner if interventions unsuccessful or patient reports new pain  - Educate patient/family on pain management process including their role and importance of  reporting pain   - Provide non-pharmacologic/complimentary pain relief interventions  Outcome: Progressing     Problem: INFECTION - ADULT  Goal: Absence or prevention of progression during hospitalization  Description: INTERVENTIONS:  - Assess and monitor for signs and symptoms of infection  - Monitor lab/diagnostic results  - Monitor all insertion sites, i.e. indwelling lines, tubes, and drains  - Monitor endotracheal if appropriate and nasal secretions for changes in amount and color  - [place] appropriate cooling/warming therapies per order  - Administer medications as ordered  - Instruct and encourage patient and family to use good hand hygiene technique  - Identify and instruct in appropriate isolation precautions for identified infection/condition  Outcome: Progressing  Goal: Absence of fever/infection during neutropenic period  Description: INTERVENTIONS:  - Monitor WBC  - Perform strict hand hygiene  - Limit to healthy visitors only  - No plants, dried, fresh or silk flowers with foote in patient room  Outcome: Progressing     Problem: SAFETY ADULT  Goal: Patient will remain free of falls  Description: INTERVENTIONS:  - Educate patient/family on patient safety including physical limitations  - Instruct patient to call for assistance with activity   -  Consider consulting OT/PT to assist with strengthening/mobility based on AM PAC & JH-HLM score  - Consult OT/PT to assist with strengthening/mobility   - Keep Call bell within reach  - Keep bed low and locked with side rails adjusted as appropriate  - Keep care items and personal belongings within reach  - Initiate and maintain comfort rounds  - Make Fall Risk Sign visible to staff  - Offer Toileting every x Hours, in advance of need  - Initiate/Maintain xalarm  - Obtain necessary fall risk management equipment: x  - Apply yellow socks and bracelet for high fall risk patients  - Consider moving patient to room near nurses station  Outcome: Progressing  Goal: Maintain or return to baseline ADL function  Description: INTERVENTIONS:  -  Assess patient's ability to carry out ADLs; assess patient's baseline for ADL function and identify physical deficits which impact ability to perform ADLs (bathing, care of mouth/teeth, toileting, grooming, dressing, etc.)  - Assess/evaluate cause of self-care deficits   - Assess range of motion  - Assess patient's mobility; develop plan if impaired  - Assess patient's need for assistive devices and provide as appropriate  - Encourage maximum independence but intervene and supervise when necessary  - Involve family in performance of ADLs  - Assess for home care needs following discharge   - Consider OT consult to assist with ADL evaluation and planning for discharge  - Provide patient education as appropriate  - Monitor functional capacity and physical performance, use of AM PAC & JH-HLM   - Monitor gait, balance and fatigue with ambulation    Outcome: Progressing  Goal: Maintains/Returns to pre admission functional level  Description: INTERVENTIONS:  - Perform AM-PAC 6 Click Basic Mobility/ Daily Activity assessment daily.  - Set and communicate daily mobility goal to care team and patient/family/caregiver.   - Collaborate with rehabilitation services on mobility goals if consulted  -  Perform Range of Motion xx times a day.  - Reposition patient every x hours.  - Dangle patient x times a day  - Stand patient x times a day  - Ambulate patient x times a day  - Out of bed to chair x times a day   - Out of bed for meals x times a day  - Out of bed for toileting  - Record patient progress and toleration of activity level   Outcome: Progressing     Problem: DISCHARGE PLANNING  Goal: Discharge to home or other facility with appropriate resources  Description: INTERVENTIONS:  - Identify barriers to discharge w/patient and caregiver  - Arrange for needed discharge resources and transportation as appropriate  - Identify discharge learning needs (meds, wound care, etc.)  - Arrange for interpretive services to assist at discharge as needed  - Refer to Case Management Department for coordinating discharge planning if the patient needs post-hospital services based on physician/advanced practitioner order or complex needs related to functional status, cognitive ability, or social support system  Outcome: Progressing     Problem: Knowledge Deficit  Goal: Patient/family/caregiver demonstrates understanding of disease process, treatment plan, medications, and discharge instructions  Description: Complete learning assessment and assess knowledge base.  Interventions:  - Provide teaching at level of understanding  - Provide teaching via preferred learning methods  Outcome: Progressing

## 2025-07-17 NOTE — DISCHARGE INSTR - AVS FIRST PAGE
Please follow up with PCP and have INR checked on Monday-Tuesday, continue 5 mg warfarin daily for now

## 2025-07-17 NOTE — ASSESSMENT & PLAN NOTE
"  1.Paresthesias of both hands: Pt presented with c/o intermittent tingling of hands, malaise, fatigue, and muscle spasms of forearms x 1 week. He also noted tingling and cramps in the back of his legs. Pt reports tick bite one week PTA - ?dog tick.     He was seen in [facility] on 7/7 with tick attached to the skin of his right upper arm. Pt states that he noticed it at 8 AM today. He checked his skin before bathing on 7/6 and it was not there. He was unable to remove it at home. It was not engorged. He had  just returned from [facility] to [facility]. The tick was removed at [facility] but he was not started on Doxycycline because he reported that he was allergic to this med    On admission, he did not have fever and WBC count was 5.8K. His bilat arm numbness had resolved. He reports that his arms felt like they were asleep before he came to the ER. No imaging studies were done in the ER. He was started on Rocephin for possibility of Lyme meningitis. He underwent LP by IR this afternoon. Spinal fluid studies are pending. Lyme titer is neg which could indicate that he does not have Lyme disease or he has early Lyme disease although he reports a larger tick was removed which would be more c/w dog tick which does not transmit Lyme disease. Blood parasite smear is neg. CT of neck has been ordered    7/16: No fever and WBC count is 6.6K today. He has occas paresthesias of his hands but not as severe. Lyme titer was neg which could indicate that the Pt does not have Lyme disease or he has early Lyme disease. Pt underwent LP on 7/15. CSF cell count showed only 1 WBC's (100% lymphs) and gluc of 68. CSF protein was not done. Pt does not have any evidence of Lyme meningitis. Pt also reports removal of larger \"wood\" tick and not deer tick which would make Lyme infxn unlikely from recent tick bite. CSF cx is neg so far. Agree with Neurology that CTS is the most likely etiology of his current presentation. Head CT and neck CT did not show any " etiology for his current sx's    - Will d/c Rocephin after today's dose  - Awaiting results of other serologies for tick borne illnesses but at this time, he does not appear to have an infxn  - Will cont to monitor for the development of toxicity to current abx tx  - If Pt conts to improve, OK to d/c Pt tomorrow off abx  - Pt will f/u with me an an out-Pt and if sx's persist, will repeat Lyme studies in 4 weeks to see if they become positive

## 2025-07-17 NOTE — UTILIZATION REVIEW
NOTIFICATION OF INPATIENT ADMISSION   AUTHORIZATION REQUEST   SERVICING FACILITY:   [place] [address]  Tax ID: [id number]  NPI: [id number] ATTENDING PROVIDER:  Attending Name and NPI#: Shantel Donovan Md [[id number]]  Address: [address] [place] [address]  Phone: [phone]   ADMISSION INFORMATION:  Place of Service: Inpatient Acute [facility]  Place of Service Code: 21  Inpatient Admission Date/Time: 7/14/25  2:36 PM  Discharge Date/Time: 7/17/2025 10:48 AM  Admitting Diagnosis Code/Description:  Lyme disease [A69.20]  Myalgia [M79.10]  Tingling in extremities [R20.2]  Paresthesia of hand, bilateral [R20.2]     UTILIZATION REVIEW CONTACT:  Kinsey Marcos, Utilization   Network Utilization Review Department  Phone: [phone]  Fax: [phone]  Email: [email or web address]  Contact for approvals/pending authorizations, clinical reviews, and discharge.     PHYSICIAN ADVISORY SERVICES:  Medical Necessity Denial & Ifpj-wm-Dxxi Review  Phone: [phone]  Fax: [phone]  Email: [email or web address]     DISCHARGE SUPPORT TEAM:  For Patients Discharge Needs & Updates  Phone: [phone] opt. 2 Fax: [phone]  Email: [email or web address]

## 2025-07-17 NOTE — PROGRESS NOTES
Progress Note - Infectious Disease   Name: Danilo Padilla Jr. 70 y.o. male I MRN: [id number]  Unit/Bed#: [facility] I Date of Admission: 7/14/2025   Date of Service: 7/16/2025 I Hospital Day: 2    Assessment & Plan  Paresthesia of both hands  Hyperlipidemia  Hypertension    1.Paresthesias of both hands: Pt presented with c/o intermittent tingling of hands, malaise, fatigue, and muscle spasms of forearms x 1 week. He also noted tingling and cramps in the back of his legs. Pt reports tick bite one week PTA - ?dog tick.     He was seen in [facility] on 7/7 with tick attached to the skin of his right upper arm. Pt states that he noticed it at 8 AM today. He checked his skin before bathing on 7/6 and it was not there. He was unable to remove it at home. It was not engorged. He had  just returned from [facility] to [facility]. The tick was removed at [facility] but he was not started on Doxycycline because he reported that he was allergic to this med    On admission, he did not have fever and WBC count was 5.8K. His bilat arm numbness had resolved. He reports that his arms felt like they were asleep before he came to the ER. No imaging studies were done in the ER. He was started on Rocephin for possibility of Lyme meningitis. He underwent LP by IR this afternoon. Spinal fluid studies are pending. Lyme titer is neg which could indicate that he does not have Lyme disease or he has early Lyme disease although he reports a larger tick was removed which would be more c/w dog tick which does not transmit Lyme disease. Blood parasite smear is neg. CT of neck has been ordered    7/16: No fever and WBC count is 6.6K today. He has occas paresthesias of his hands but not as severe. Lyme titer was neg which could indicate that the Pt does not have Lyme disease or he has early Lyme disease. Pt underwent LP on 7/15. CSF cell count showed only 1 WBC's (100% lymphs) and gluc of 68. CSF protein was not done. Pt does not have any evidence of Lyme meningitis. Pt  "also reports removal of larger \"wood\" tick and not deer tick which would make Lyme infxn unlikely from recent tick bite. CSF cx is neg so far. Agree with Neurology that CTS is the most likely etiology of his current presentation. Head CT and neck CT did not show any etiology for his current sx's    - Will d/c Rocephin after today's dose  - Awaiting results of other serologies for tick borne illnesses but at this time, he does not appear to have an infxn  - Will cont to monitor for the development of toxicity to current abx tx  - If Pt conts to improve, OK to d/c Pt tomorrow off abx  - Pt will f/u with me an an out-Pt and if sx's persist, will repeat Lyme studies in 4 weeks to see if they become positive        I have discussed the above management plan in detail with the primary service.   I have discussed with Dr. Shantel Donovan the above plan to tx with current abx. She  agrees with the plan.    Antibiotics:  Rocephin: #3       Subjective   Patient has no fever, chills, sweats; no nausea, vomiting, diarrhea; no cough, shortness of breath; no pain. No new symptoms.    Objective :  Temp:  [97.6 °F (36.4 °C)-98.5 °F (36.9 °C)] 98.5 °F (36.9 °C)  HR:  [64-66] 65  BP: (122-136)/(65-85) 136/80  Resp:  [16] 16  SpO2:  [91 %-95 %] 91 %  O2 Device: None (Room air)    General:  No acute distress but still has occas paresthesias of his hands  Psychiatric:  Awake and alert  Pulmonary:  Normal respiratory excursion without accessory muscle use  Cardiovascular: RRR, nml S1, S2  Abdomen:  Soft, nontender  Extremities:  No edema  Skin:  No rashes        Lab Results: I have reviewed the following results:  Results from last 7 days   Lab Units 07/16/25  0412 07/15/25  0336 07/14/25  1144   WBC Thousand/uL 6.61 6.73 5.80   HEMOGLOBIN g/dL 14.6 15.1 14.6   PLATELETS Thousands/uL 184 203 203     Results from last 7 days   Lab Units 07/16/25  0412 07/15/25  0336 07/14/25  1144   SODIUM mmol/L 139 140 139   POTASSIUM mmol/L 3.5 3.5 3.5 "   CHLORIDE mmol/L 108 107 106   CO2 mmol/L 24 27 28   BUN mg/dL 16 13 13   CREATININE mg/dL 1.04 0.97 1.04   EGFR ml/min/1.73sq m 72 78 72   CALCIUM mg/dL 8.8 9.2 9.2   AST U/L  --  23 21   ALT U/L  --  21 21   ALK PHOS U/L  --  65 71   ALBUMIN g/dL  --  3.9 4.1     Results from last 7 days   Lab Units 07/15/25  1526   GRAM STAIN RESULT  1+ Mononuclear Cells  No Polys or Bacteria seen                     Imaging Results Review: I reviewed radiology reports from this admission including: CT head.  Other Study Results Review: Other studies reviewed include: CT of neck

## 2025-07-17 NOTE — ASSESSMENT & PLAN NOTE
"Has been having intermittent tingling for the last week, but since yesterday he felt like arms \"fell asleep\" to the forearm area, improved at the time of evaluation, he also has some chills and abdominal discomfort  Afebrile, no leukocytosis  Reports removing a tick over a week ago  ED contacted ID, advised lab workup including Anaplasma and Babesia/malaria as well as LP with Lyme PCR  Started on ceftriaxone, aware about listed allergy, but patient never had it and allergy listed as hyperventilation, discussed with ID, will start cautiously and observe for any reaction  Plan for LP puncture, needs INR to be less than 1.5,2.33 on admission, received 5 mg IV vitamin K, rechecked INR- 1.41  Resumed warfarin at 5 mg daily on 7/16  Monitor fever, leukocytosis  ID consulted, LP results so far unrevealing  CT of the neck ordered-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

## 2025-07-17 NOTE — ASSESSMENT & PLAN NOTE
History of PE in his 40s, but then small PE again around 6 months  on warfarin 5 mg daily  Reversed warfarin with 5 mg IV vitamin K for lumbar puncture on 7/15  Resumed warfarin 5 mg daily on 7/16  Advised to check INR in 3 to 4 days

## 2025-07-18 LAB
A PHAGOCYTOPH DNA BLD QL NAA+PROBE: NEGATIVE
ATRIAL RATE: 65 BPM
P AXIS: 0 DEGREES
PR INTERVAL: 158 MS
QRS AXIS: 3 DEGREES
QRSD INTERVAL: 94 MS
QT INTERVAL: 414 MS
QTC INTERVAL: 431 MS
T WAVE AXIS: 1 DEGREES
VENTRICULAR RATE: 65 BPM

## 2025-07-18 PROCEDURE — 93010 ELECTROCARDIOGRAM REPORT: CPT | Performed by: INTERNAL MEDICINE

## 2025-07-18 NOTE — UTILIZATION REVIEW
NOTIFICATION OF ADMISSION DISCHARGE   This is a Notification of Discharge from [facility]. Please be advised that this patient has been discharge from our facility. Below you will find the admission and discharge date and time including the patient’s disposition.   UTILIZATION REVIEW CONTACT:  Utilization Review Assistants  Network Utilization Review Department  Phone: [phone] x carefully listen to the prompts. All voicemails are confidential.  Email: [email or web address]     ADMISSION INFORMATION  PRESENTATION DATE: 7/14/2025 10:52 AM  OBERVATION ADMISSION DATE: N/A  INPATIENT ADMISSION DATE: 7/14/25  2:36 PM   DISCHARGE DATE: 7/17/2025 10:48 AM   DISPOSITION:Home/Self Care    Network Utilization Review Department  ATTENTION: Please call with any questions or concerns to [phone] and carefully listen to the prompts so that you are directed to the right person. All voicemails are confidential.   For Discharge needs, contact Care Management DC Support Team at [phone] opt. 2  Send all requests for admission clinical reviews, approved or denied determinations and any other requests to dedicated fax number below belonging to the campus where the patient is receiving treatment. List of dedicated fax numbers for the Facilities:  FACILITY NAME UR FAX NUMBER   ADMISSION DENIALS (Administrative/Medical Necessity) [phone]   DISCHARGE SUPPORT TEAM ([facility]) [phone]   PARENT CHILD HEALTH (Maternity/NICU/Pediatrics) [phone]   [facility] [phone]   [facility] [phone]   [facility] [phone]   [facility] [phone]   [facility] [phone]   [facility] [phone]   [facility] [phone]   [facility] [phone]   [facility]  [facility] [phone]   [facility] [phone]   [facility] [phone]   [facility] [phone]

## 2025-07-19 LAB
ATRIAL RATE: 61 BPM
P AXIS: 11 DEGREES
PR INTERVAL: 164 MS
QRS AXIS: 31 DEGREES
QRSD INTERVAL: 90 MS
QT INTERVAL: 416 MS
QTC INTERVAL: 418 MS
T WAVE AXIS: 9 DEGREES
VENTRICULAR RATE: 61 BPM

## 2025-07-19 PROCEDURE — 93010 ELECTROCARDIOGRAM REPORT: CPT | Performed by: INTERNAL MEDICINE

## 2025-07-21 LAB — B BURGDOR DNA SPEC QL NAA+PROBE: NEGATIVE

## 2025-07-23 LAB — MISCELLANEOUS LAB TEST RESULT: NORMAL

## 2025-07-27 ENCOUNTER — HOSPITAL ENCOUNTER (EMERGENCY)
Facility: HOSPITAL | Age: 70
Discharge: HOME | End: 2025-07-27
Attending: EMERGENCY MEDICINE | Admitting: EMERGENCY MEDICINE
Payer: COMMERCIAL

## 2025-07-27 ENCOUNTER — APPOINTMENT (EMERGENCY)
Dept: RADIOLOGY | Facility: HOSPITAL | Age: 70
End: 2025-07-27
Payer: COMMERCIAL

## 2025-07-27 VITALS
OXYGEN SATURATION: 96 % | TEMPERATURE: 97 F | RESPIRATION RATE: 14 BRPM | HEART RATE: 55 BPM | DIASTOLIC BLOOD PRESSURE: 90 MMHG | SYSTOLIC BLOOD PRESSURE: 157 MMHG

## 2025-07-27 DIAGNOSIS — I49.3 SYMPTOMATIC PVCS: Primary | ICD-10-CM

## 2025-07-27 LAB
2HR DELTA HS TROPONIN: 0 NG/L
ALBUMIN SERPL BCG-MCNC: 4.3 G/DL (ref 3.5–5)
ALP SERPL-CCNC: 78 U/L (ref 34–104)
ALT SERPL W P-5'-P-CCNC: 22 U/L (ref 7–52)
ANION GAP SERPL CALCULATED.3IONS-SCNC: 7 MMOL/L (ref 4–13)
AST SERPL W P-5'-P-CCNC: 25 U/L (ref 13–39)
BASOPHILS # BLD AUTO: 0.04 THOUSANDS/ÂΜL (ref 0–0.1)
BASOPHILS NFR BLD AUTO: 1 % (ref 0–1)
BILIRUB SERPL-MCNC: 0.86 MG/DL (ref 0.2–1)
BUN SERPL-MCNC: 19 MG/DL (ref 5–25)
CALCIUM SERPL-MCNC: 9.5 MG/DL (ref 8.4–10.2)
CARDIAC TROPONIN I PNL SERPL HS: 4 NG/L (ref ?–50)
CARDIAC TROPONIN I PNL SERPL HS: 4 NG/L (ref ?–50)
CHLORIDE SERPL-SCNC: 106 MMOL/L (ref 96–108)
CO2 SERPL-SCNC: 27 MMOL/L (ref 21–32)
CREAT SERPL-MCNC: 1.31 MG/DL (ref 0.6–1.3)
EOSINOPHIL # BLD AUTO: 0.15 THOUSAND/ÂΜL (ref 0–0.61)
EOSINOPHIL NFR BLD AUTO: 3 % (ref 0–6)
ERYTHROCYTE [DISTWIDTH] IN BLOOD BY AUTOMATED COUNT: 14 % (ref 11.6–15.1)
GFR SERPL CREATININE-BSD FRML MDRD: 54 ML/MIN/1.73SQ M
GLUCOSE SERPL-MCNC: 108 MG/DL (ref 65–140)
HCT VFR BLD AUTO: 45.2 % (ref 36.5–49.3)
HGB BLD-MCNC: 15.4 G/DL (ref 12–17)
IMM GRANULOCYTES # BLD AUTO: 0.03 THOUSAND/UL (ref 0–0.2)
IMM GRANULOCYTES NFR BLD AUTO: 1 % (ref 0–2)
LIPASE SERPL-CCNC: 19 U/L (ref 11–82)
LYMPHOCYTES # BLD AUTO: 1.55 THOUSANDS/ÂΜL (ref 0.6–4.47)
LYMPHOCYTES NFR BLD AUTO: 27 % (ref 14–44)
MCH RBC QN AUTO: 31.7 PG (ref 26.8–34.3)
MCHC RBC AUTO-ENTMCNC: 34.1 G/DL (ref 31.4–37.4)
MCV RBC AUTO: 93 FL (ref 82–98)
MONOCYTES # BLD AUTO: 0.41 THOUSAND/ÂΜL (ref 0.17–1.22)
MONOCYTES NFR BLD AUTO: 7 % (ref 4–12)
NEUTROPHILS # BLD AUTO: 3.65 THOUSANDS/ÂΜL (ref 1.85–7.62)
NEUTS SEG NFR BLD AUTO: 61 % (ref 43–75)
NRBC BLD AUTO-RTO: 0 /100 WBCS
PLATELET # BLD AUTO: 221 THOUSANDS/UL (ref 149–390)
PMV BLD AUTO: 10 FL (ref 8.9–12.7)
POTASSIUM SERPL-SCNC: 4.2 MMOL/L (ref 3.5–5.3)
PROT SERPL-MCNC: 7.6 G/DL (ref 6.4–8.4)
RBC # BLD AUTO: 4.86 MILLION/UL (ref 3.88–5.62)
SODIUM SERPL-SCNC: 140 MMOL/L (ref 135–147)
WBC # BLD AUTO: 5.83 THOUSAND/UL (ref 4.31–10.16)

## 2025-07-27 PROCEDURE — 85025 COMPLETE CBC W/AUTO DIFF WBC: CPT | Performed by: EMERGENCY MEDICINE

## 2025-07-27 PROCEDURE — 71045 X-RAY EXAM CHEST 1 VIEW: CPT

## 2025-07-27 PROCEDURE — 84484 ASSAY OF TROPONIN QUANT: CPT | Performed by: EMERGENCY MEDICINE

## 2025-07-27 PROCEDURE — 83690 ASSAY OF LIPASE: CPT | Performed by: EMERGENCY MEDICINE

## 2025-07-27 PROCEDURE — 96365 THER/PROPH/DIAG IV INF INIT: CPT

## 2025-07-27 PROCEDURE — 36415 COLL VENOUS BLD VENIPUNCTURE: CPT | Performed by: EMERGENCY MEDICINE

## 2025-07-27 PROCEDURE — 99284 EMERGENCY DEPT VISIT MOD MDM: CPT | Performed by: EMERGENCY MEDICINE

## 2025-07-27 PROCEDURE — 93005 ELECTROCARDIOGRAM TRACING: CPT

## 2025-07-27 PROCEDURE — 99285 EMERGENCY DEPT VISIT HI MDM: CPT

## 2025-07-27 PROCEDURE — 80053 COMPREHEN METABOLIC PANEL: CPT | Performed by: EMERGENCY MEDICINE

## 2025-07-27 RX ORDER — MAGNESIUM SULFATE 1 G/100ML
1 INJECTION INTRAVENOUS ONCE
Status: COMPLETED | OUTPATIENT
Start: 2025-07-27 | End: 2025-07-27

## 2025-07-27 RX ADMIN — MAGNESIUM SULFATE IN DEXTROSE 1 G: 10 INJECTION, SOLUTION INTRAVENOUS at 20:16

## 2025-07-27 RX ADMIN — SODIUM CHLORIDE 1000 ML: 0.9 INJECTION, SOLUTION INTRAVENOUS at 20:15

## 2025-07-29 ENCOUNTER — OFFICE VISIT (OUTPATIENT)
Age: 70
End: 2025-07-29
Payer: COMMERCIAL

## 2025-07-29 VITALS
OXYGEN SATURATION: 95 % | TEMPERATURE: 98.1 F | DIASTOLIC BLOOD PRESSURE: 75 MMHG | SYSTOLIC BLOOD PRESSURE: 128 MMHG | BODY MASS INDEX: 34.14 KG/M2 | WEIGHT: 218 LBS | HEART RATE: 82 BPM

## 2025-07-29 DIAGNOSIS — R20.2 PARESTHESIA OF BOTH HANDS: Primary | ICD-10-CM

## 2025-07-29 PROCEDURE — 99214 OFFICE O/P EST MOD 30 MIN: CPT | Performed by: INTERNAL MEDICINE

## 2025-07-31 LAB
ATRIAL RATE: 68 BPM
P AXIS: 7 DEGREES
PR INTERVAL: 126 MS
QRS AXIS: 27 DEGREES
QRSD INTERVAL: 84 MS
QT INTERVAL: 398 MS
QTC INTERVAL: 423 MS
T WAVE AXIS: 11 DEGREES
VENTRICULAR RATE: 68 BPM

## 2025-07-31 PROCEDURE — 93010 ELECTROCARDIOGRAM REPORT: CPT | Performed by: INTERNAL MEDICINE

## 2025-08-18 VITALS — BODY MASS INDEX: 34.21 KG/M2 | HEIGHT: 67 IN | WEIGHT: 218 LBS

## 2025-08-18 DIAGNOSIS — G56.03 CARPAL TUNNEL SYNDROME, BILATERAL: Primary | ICD-10-CM

## 2025-08-18 PROCEDURE — 99203 OFFICE O/P NEW LOW 30 MIN: CPT | Performed by: SURGERY

## 2025-08-22 LAB — B BURGDOR IGG+IGM SER QL IA: NEGATIVE
